# Patient Record
Sex: FEMALE | Race: BLACK OR AFRICAN AMERICAN | NOT HISPANIC OR LATINO | Employment: FULL TIME | URBAN - METROPOLITAN AREA
[De-identification: names, ages, dates, MRNs, and addresses within clinical notes are randomized per-mention and may not be internally consistent; named-entity substitution may affect disease eponyms.]

---

## 2017-01-12 ENCOUNTER — OFFICE VISIT (OUTPATIENT)
Dept: FAMILY MEDICINE | Facility: CLINIC | Age: 42
End: 2017-01-12
Payer: COMMERCIAL

## 2017-01-12 VITALS
RESPIRATION RATE: 18 BRPM | OXYGEN SATURATION: 97 % | BODY MASS INDEX: 45.19 KG/M2 | WEIGHT: 245.56 LBS | SYSTOLIC BLOOD PRESSURE: 110 MMHG | DIASTOLIC BLOOD PRESSURE: 70 MMHG | TEMPERATURE: 100 F | HEIGHT: 62 IN | HEART RATE: 105 BPM

## 2017-01-12 DIAGNOSIS — K59.00 CONSTIPATION, UNSPECIFIED CONSTIPATION TYPE: ICD-10-CM

## 2017-01-12 DIAGNOSIS — J34.3 NASAL TURBINATE HYPERTROPHY: ICD-10-CM

## 2017-01-12 DIAGNOSIS — R50.9 FEVER, UNSPECIFIED FEVER CAUSE: ICD-10-CM

## 2017-01-12 DIAGNOSIS — B34.9 VIRAL ILLNESS: Primary | ICD-10-CM

## 2017-01-12 LAB
CTP QC/QA: YES
CTP QC/QA: YES
FLUAV AG NPH QL: NEGATIVE
FLUBV AG NPH QL: NEGATIVE
S PYO RRNA THROAT QL PROBE: NEGATIVE

## 2017-01-12 PROCEDURE — 1159F MED LIST DOCD IN RCRD: CPT | Mod: S$GLB,,, | Performed by: FAMILY MEDICINE

## 2017-01-12 PROCEDURE — 87804 INFLUENZA ASSAY W/OPTIC: CPT | Mod: QW,S$GLB,, | Performed by: FAMILY MEDICINE

## 2017-01-12 PROCEDURE — 87880 STREP A ASSAY W/OPTIC: CPT | Mod: QW,S$GLB,, | Performed by: FAMILY MEDICINE

## 2017-01-12 PROCEDURE — 99214 OFFICE O/P EST MOD 30 MIN: CPT | Mod: 25,S$GLB,, | Performed by: FAMILY MEDICINE

## 2017-01-12 PROCEDURE — 99999 PR PBB SHADOW E&M-EST. PATIENT-LVL III: CPT | Mod: PBBFAC,,, | Performed by: FAMILY MEDICINE

## 2017-01-12 RX ORDER — FLUTICASONE PROPIONATE 50 MCG
2 SPRAY, SUSPENSION (ML) NASAL DAILY
Qty: 16 G | Refills: 0 | Status: SHIPPED | OUTPATIENT
Start: 2017-01-12 | End: 2017-05-09

## 2017-01-12 NOTE — MR AVS SNAPSHOT
[facility]  [address] [facility]  [place] [address]  Phone: [phone]                  Sophia Cesar   2017 8:00 AM   Office Visit    Description:  Female : 1975   Provider:  Perla Cheney MD   Department:  [facility]           Reason for Visit     Cough     Fever     Headache           Diagnoses this Visit        Comments    Fever, unspecified fever cause    -  Primary     Nasal turbinate hypertrophy                To Do List           Goals (5 Years of Data)     None       These Medications        Disp Refills Start End    fluticasone (FLONASE) 50 mcg/actuation nasal spray 16 g 0 2017     2 sprays by Each Nare route once daily. - Each Nare    Pharmacy: [facility] [address] [place] [address] [place] [address] GINNY GARCES AT [facility] #: [phone]         [facility] On Call     [facility] On Call Nurse Care Line -  Assistance  Registered nurses in the [facility] On Call Center provide clinical advisement, health education, appointment booking, and other advisory services.  Call for this free service at [phone].             Medications           Message regarding Medications     Verify the changes and/or additions to your medication regime listed below are the same as discussed with your clinician today.  If any of these changes or additions are incorrect, please notify your healthcare provider.        START taking these NEW medications        Refills    fluticasone (FLONASE) 50 mcg/actuation nasal spray 0    Si sprays by Each Nare route once daily.    Class: Normal    Route: Each Nare           Verify that the below list of medications is an accurate representation of the medications you are currently taking.  If none reported, the list may be blank. If incorrect, please contact your healthcare provider. Carry this list with you in case of emergency.           Current Medications     ergocalciferol  "(ERGOCALCIFEROL) 50,000 unit Cap Take 1 capsule (50,000 Units total) by mouth every 7 days.    fluticasone (FLONASE) 50 mcg/actuation nasal spray 2 sprays by Each Nare route once daily.           Clinical Reference Information           Vital Signs - Last Recorded  Most recent update: 1/12/2017  8:15 AM by Ann Cerda MA    BP Pulse Temp Resp    110/70 (BP Location: Right arm, Patient Position: Sitting, BP Method: Manual) 105 100 °F (37.8 °C) (Tympanic) 18    Ht Wt SpO2 BMI    5' 2" (1.575 m) 111.4 kg (245 lb 9.5 oz) 97% 44.92 kg/m2      Blood Pressure          Most Recent Value    BP  110/70      Allergies as of 1/12/2017     No Known Allergies      Immunizations Administered on Date of Encounter - 1/12/2017     None      Orders Placed During Today's Visit      Normal Orders This Visit    POCT Influenza A/B     POCT Rapid Strep A          1/12/2017  8:39 AM - Ann Cerda MA      Component Results     Component Value Flag Ref Range Units Status    Rapid Strep A Screen Negative  Negative  Final     Acceptable Yes    Final         1/12/2017  8:38 AM - Ann Cerda MA      Component Results     Component Value Flag Ref Range Units Status    Rapid Influenza A Ag Negative  Negative  Final    Rapid Influenza B Ag Negative  Negative  Final     Acceptable Yes    Final      "

## 2017-01-12 NOTE — PROGRESS NOTES
Subjective:       Patient ID: Sophia Cesar is a 41 y.o. female.    Chief Complaint: Cough; Fever; and Headache      HPI  Ms. Cesar presents to clinic today for complaints of fever.   She states she had a fever of 102. Her symptoms started Tuesday after work. She states she did get her flu shot in November. She also reports she has been constipated for the past few days.   Her cough is a dry cough.     Review of Systems   Constitutional: Positive for fever.   HENT: Positive for sore throat. Negative for rhinorrhea, sinus pressure and sneezing.    Respiratory: Positive for cough. Negative for shortness of breath.    Cardiovascular: Negative for chest pain.   Gastrointestinal: Positive for constipation. Negative for abdominal pain, blood in stool, nausea and vomiting.   Genitourinary: Negative for difficulty urinating and hematuria.   Musculoskeletal: Positive for myalgias.   Skin: Negative for rash.   Neurological: Negative for dizziness and headaches.       Medication List with Changes/Refills   New Medications    FLUTICASONE (FLONASE) 50 MCG/ACTUATION NASAL SPRAY    2 sprays by Each Nare route once daily.   Current Medications    ERGOCALCIFEROL (ERGOCALCIFEROL) 50,000 UNIT CAP    Take 1 capsule (50,000 Units total) by mouth every 7 days.       Patient Active Problem List   Diagnosis    Anxiety and depression    ETD (eustachian tube dysfunction)    TMJ (dislocation of temporomandibular joint)    Otalgia of right ear    Morbid obesity with BMI of 40.0-44.9, adult    Vitamin D deficiency    Pre-diabetes         Objective:     Physical Exam   Constitutional: She is oriented to person, place, and time. She appears well-developed and well-nourished. No distress.   HENT:   Head: Normocephalic and atraumatic.   Right Ear: External ear normal.   Left Ear: External ear normal.   Mouth/Throat: Oropharynx is clear and moist. No oropharyngeal exudate.   B/l tm wnl    Eyes: EOM are normal. Right eye exhibits no  discharge. Left eye exhibits no discharge.   Cardiovascular: Normal rate and regular rhythm.    Pulmonary/Chest: Effort normal and breath sounds normal. No respiratory distress. She has no wheezes.   Musculoskeletal: She exhibits no edema.   Neurological: She is alert and oriented to person, place, and time.   Skin: Skin is warm and dry. She is not diaphoretic. No erythema.   Psychiatric: She has a normal mood and affect.   Vitals reviewed.    Vitals:    01/12/17 0814   BP: 110/70   Pulse: 105   Resp: 18   Temp: 100 °F (37.8 °C)       Assessment/  PLAN     Viral illness  - flu and strep negative   - continue supportive care   - rest , hydration, fluids     Fever, unspecified fever cause  -     POCT Rapid Strep A  -     POCT Influenza A/B    Nasal turbinate hypertrophy  -     fluticasone (FLONASE) 50 mcg/actuation nasal spray; 2 sprays by Each Nare route once daily.  Dispense: 16 g; Refill: 0    Constipation, unspecified constipation type  - suggested she get miralax or fiber gummies, she states her mother has some miralax and she will try that   - increase water intake     Plan as above   rtc prn  Work excuse given  Return precautions advised    Perla Cheney MD  [facility]

## 2017-01-12 NOTE — LETTER
01/12/2017                 [facility]  [address]  [place] [address]  Phone: [phone]   01/12/2017    Patient: Sophia Cesar   YOB: 1975   Date of Visit: 1/12/2017       To Whom it May Concern:    Sophia Cesar was seen in my clinic on 1/12/2017. She may return to work on 01/16/2017.    If you have any questions or concerns, please don't hesitate to call.    Sincerely,         Perla Cheney MD

## 2017-01-15 RX ORDER — ERGOCALCIFEROL 1.25 MG/1
CAPSULE ORAL
Qty: 4 CAPSULE | Refills: 0 | OUTPATIENT
Start: 2017-01-15

## 2017-01-16 RX ORDER — ERGOCALCIFEROL 1.25 MG/1
50000 CAPSULE ORAL
Qty: 4 CAPSULE | Refills: 1 | Status: SHIPPED | OUTPATIENT
Start: 2017-01-16 | End: 2017-03-15 | Stop reason: SDUPTHER

## 2017-03-15 RX ORDER — ERGOCALCIFEROL 1.25 MG/1
CAPSULE ORAL
Qty: 4 CAPSULE | Refills: 0 | Status: SHIPPED | OUTPATIENT
Start: 2017-03-15 | End: 2017-04-18 | Stop reason: SDUPTHER

## 2017-04-18 RX ORDER — ERGOCALCIFEROL 1.25 MG/1
CAPSULE ORAL
Qty: 4 CAPSULE | Refills: 0 | Status: SHIPPED | OUTPATIENT
Start: 2017-04-18 | End: 2017-06-01 | Stop reason: SDUPTHER

## 2017-04-28 ENCOUNTER — PATIENT OUTREACH (OUTPATIENT)
Dept: ADMINISTRATIVE | Facility: HOSPITAL | Age: 42
End: 2017-04-28

## 2017-05-09 ENCOUNTER — OFFICE VISIT (OUTPATIENT)
Dept: FAMILY MEDICINE | Facility: CLINIC | Age: 42
End: 2017-05-09
Payer: COMMERCIAL

## 2017-05-09 VITALS
SYSTOLIC BLOOD PRESSURE: 114 MMHG | HEIGHT: 62 IN | BODY MASS INDEX: 46.01 KG/M2 | RESPIRATION RATE: 18 BRPM | WEIGHT: 250 LBS | HEART RATE: 86 BPM | TEMPERATURE: 97 F | DIASTOLIC BLOOD PRESSURE: 82 MMHG

## 2017-05-09 DIAGNOSIS — R73.03 PRE-DIABETES: ICD-10-CM

## 2017-05-09 DIAGNOSIS — Z00.00 ANNUAL PHYSICAL EXAM: Primary | ICD-10-CM

## 2017-05-09 DIAGNOSIS — E66.01 MORBID OBESITY WITH BMI OF 45.0-49.9, ADULT: ICD-10-CM

## 2017-05-09 DIAGNOSIS — E55.9 VITAMIN D DEFICIENCY: ICD-10-CM

## 2017-05-09 PROCEDURE — 99999 PR PBB SHADOW E&M-EST. PATIENT-LVL III: CPT | Mod: PBBFAC,,, | Performed by: FAMILY MEDICINE

## 2017-05-09 PROCEDURE — 99395 PREV VISIT EST AGE 18-39: CPT | Mod: S$GLB,,, | Performed by: FAMILY MEDICINE

## 2017-05-09 NOTE — MR AVS SNAPSHOT
[facility]  [address]  [place] [address]  Phone: [phone]                  Sophia Cesar   2017 9:15 AM   Office Visit    Description:  Female : 1975   Provider:  Barbi Elkins MD   Department:  [facility]           Reason for Visit     Annual Exam           Diagnoses this Visit        Comments    Annual physical exam    -  Primary     Pre-diabetes         Vitamin D deficiency         Morbid obesity with BMI of 45.0-49.9, adult                To Do List           Goals (5 Years of Data)     None      [facility]     [facility] On Call Nurse Care Line -  Assistance  Unless otherwise directed by your provider, please contact [facility], our nurse care line that is available for  assistance.     Registered nurses in the [facility] On Call Center provide: appointment scheduling, clinical advisement, health education, and other advisory services.  Call: [phone] (toll free)               Medications           Message regarding Medications     Verify the changes and/or additions to your medication regime listed below are the same as discussed with your clinician today.  If any of these changes or additions are incorrect, please notify your healthcare provider.        STOP taking these medications     fluticasone (FLONASE) 50 mcg/actuation nasal spray 2 sprays by Each Nare route once daily.           Verify that the below list of medications is an accurate representation of the medications you are currently taking.  If none reported, the list may be blank. If incorrect, please contact your healthcare provider. Carry this list with you in case of emergency.           Current Medications     ergocalciferol (ERGOCALCIFEROL) 50,000 unit Cap TAKE 1 CAPSULE BY MOUTH EVERY 7 DAYS           Clinical Reference Information           Your Vitals Were     BP Pulse Temp Resp Height Weight    114/82 (BP Location: Right arm,  "Patient Position: Sitting, BP Method: Manual) 86 97.3 °F (36.3 °C) (Tympanic) 18 5' 2" (1.575 m) 113.4 kg (250 lb)    BMI                45.73 kg/m2          Blood Pressure          Most Recent Value    BP  114/82      Allergies as of 5/9/2017     No Known Allergies      Immunizations Administered on Date of Encounter - 5/9/2017     None      Orders Placed During Today's Visit     Future Labs/Procedures Expected by Expires    C. trachomatis/N. gonorrhoeae by AMP DNA Urine  5/9/2017 7/8/2018    CBC auto differential  5/9/2017 7/8/2018    Comprehensive metabolic panel  5/9/2017 7/8/2018    Ferritin  5/9/2017 7/8/2018    Hemoglobin A1c  5/9/2017 7/8/2018    Hepatitis B surface antibody  5/9/2017 7/8/2018    Hepatitis B surface antigen  5/9/2017 7/8/2018    Hepatitis C antibody  5/9/2017 7/8/2018    HIV-1 and HIV-2 antibodies  5/9/2017 7/8/2018    Insulin, random  5/9/2017 7/8/2018    Lipid panel  5/9/2017 7/8/2018    RPR  5/9/2017 7/8/2018    TSH  5/9/2017 7/8/2018    Urinalysis  5/9/2017 7/8/2018    Vitamin D  5/9/2017 7/8/2018      Instructions    Please correspond with Dr. Elkins via My Chart for your results/follow up recommendations.        Language Assistance Services     ATTENTION: Language assistance services are available, free of charge. Please call [phone].      ATENCIÓN: Si habla español, tiene a bradley disposición servicios gratuitos de asistencia lingüística. Llame al [phone].     CHÚ Ý: N?u b?n nói Ti?ng Vi?t, có các d?ch v? h? tr? ngôn ng? mi?n phí dành cho b?n. G?i s? [phone].         [facility] complies with applicable Federal civil rights laws and does not discriminate on the basis of race, color, national origin, age, disability, or sex.        "

## 2017-05-09 NOTE — PROGRESS NOTES
HISTORY OF PRESENT ILLNESS: Ms. Cesar comes in today fasting without acute problems for annual wellness examination.     END OF LIFE DECISION: She does not have a living will and does desire life support.    Current Outpatient Prescriptions   Medication Sig    ergocalciferol (ERGOCALCIFEROL) 50,000 unit Cap TAKE 1 CAPSULE BY MOUTH EVERY 7 DAYS     SCREENINGS:   Cholesterol: April 4, 2016.  FFS/Colonoscopy: Never.  Mammogram: Never.  GYN Exam: April 1, 2016 - okay. July 23, 2013 pap smear - okay. No longer needs pap smear.  Dexa Scan: Never.  Eye Exam: July 2016. She wears glasses.   PPD: Never.  Immunizations: Tdap - September 2, 2010.  Gardasil - N./A.  Zostavax - N./A.  Pneumovax - Never.  Seasonal Flu - September 28, 2016.     ROS:  GENERAL: Denies fever, chills except reports weight gain, fatigue. Appetite normal. Weight 108 kg (238 lb 1.6 oz) at July 7, 2016 visit. Does not exercise as tired. Monitors diet - drinks more water, eats more vegetables and avoids eating fast foods.  SKIN: Denies rashes, itching, changes in mole, color or texture of skin or easy bruising.  HEAD: Denies headaches or recent head trauma.  EYES: Denies change in vision, pain, diplopia, redness or discharge. Wears glasses.  EARS: Denies ear pain, discharge, vertigo or decreased hearing.  NOSE: Denies loss of smell, epistaxis or rhinitis.  MOUTH & THROAT: Denies hoarseness or change in voice. Denies excessive gum bleeding or mouth sores. Denies sore throat.  NODES: Denies swollen glands.  CHEST: Denies LUCIANO, wheezing, cough, or sputum production.  CARDIOVASCULAR: Denies chest pain, PND, orthopnea or reduced exercise tolerance. Denies palpitations.  ABDOMEN: Denies diarrhea, constipation, nausea, vomiting, abdominal pain, or blood in stool.  URINARY: Denies flank pain, dysuria or hematuria.  GENITOURINARY: Denies flank pain, dysuria, frequency or hematuria. Luciano not perform monthly breast self exam. Requests HIV test.  ENDOCRINE: Denies  "diabetes, cholesterol, thyroid problems. Reports tired even with taking vitamin D 46918 units weekly.  HEME/LYMPH: Denies bleeding problems.  PERIPHERAL VASCULAR:Denies claudication or cyanosis.  MUSCULOSKELETAL: Denies joint stiffness, pain or swelling. Denies edema.  NEUROLOGIC: Denies history of seizures, tremors, paralysis, alteration of gait or coordination.  PSYCHIATRIC: Denies mood swings, depression, anxiety, homicidal or suicidal thoughts. Denies sleep problems.     PE:   VS: /82 (BP Location: Right arm, Patient Position: Sitting, BP Method: Manual)  Pulse 86  Temp 97.3 °F (36.3 °C) (Tympanic)   Resp 18  Ht 5' 2" (1.575 m)  Wt 113.4 kg (250 lb)  BMI 45.73 kg/m2  APPEARANCE: Well nourished, well developed female, obese and pleasant, alert and oriented, in no acute distress.   HEAD: Non tender. Full range of motion.  EYES: PERRL, conjunctiva pink, lids no edema. She wears glasses.  EARS: External canal patent, no swelling or redness. TM's shiny and clear.  NOSE: Mucosa and turbinates pink, not swollen. No discharge. Non tender sinuses.  THROAT: No pharyngeal erythema or exudate. No stridor.   NECK: Supple, no mass, thyroid not enlarged.  NODES: No cervical, axillary or inguinal lymph node enlargement.  CHEST: Normal respiratory effort. Lungs clear to auscultation.  CARDIOVASCULAR: Normal S1, S2. No rubs, murmurs or gallops. PMI not displaced. No carotid bruit. Pedal pulses palpable bilaterally. No edema.  ABDOMEN: Bowel sounds present. Not distended. Soft. No tenderness, masses or organomegaly.  BREAST EXAM: Symmetrical, no external lesions, no discharge, no masses palpated.  PELVIC EXAM: No external lesions noted, no discharge, absent cervix and uterus, bimanual exam showed no adnexal masses or tenderness. Urethra and bladder intact.  RECTAL EXAM: Not examined.  MUSCULOSKELETAL: No joint deformities or stiffness. She is ambulatory without problems.  SKIN: No rashes or suspicious lesions, normal " color and turgor.  NEUROLOGIC: Cranial Nerves: II-XII grossly intact. DTR's: Knees, Ankles 2+ and equal bilaterally. Gait & Posture: Normal gait and fine motion.  PSYCHIATRIC: Patient alert, oriented x 3. Mood/Affect normal. Judgment/insight good as she makes appropriate decisions during today's exam.     ASSESSMENT:    ICD-10-CM ICD-9-CM    1. Annual physical exam Z00.00 V70.0 CBC auto differential      TSH      Urinalysis      Comprehensive metabolic panel      Lipid panel      Vitamin D      Insulin, random      Hemoglobin A1c      Ferritin      RPR      HIV-1 and HIV-2 antibodies      Hepatitis B surface antibody      Hepatitis B surface antigen      Hepatitis C antibody      C. trachomatis/N. gonorrhoeae by AMP DNA Urine   2. Pre-diabetes R73.03 790.29    3. Vitamin D deficiency E55.9 268.9    4. Morbid obesity with BMI of 45.0-49.9, adult E66.01 278.01     Z68.42 V85.42      PLAN:   1. Age-appropriate counseling-appropriate low-sodium, low-cholesterol, low carbohydrate diet and exercise daily, monthly breast self examination, safe sex practices, annual wellness examination.  2. Patient advised to correspond via My Chart for results/further recommendations.  3. Consider adding OTC MVI daily.  4. Work excuse for today with return tomorrow.

## 2017-05-10 LAB
25(OH)D3+25(OH)D2 SERPL-MCNC: 27 NG/ML (ref 30–100)
ALBUMIN SERPL-MCNC: 4.1 G/DL (ref 3.5–5.5)
ALBUMIN/GLOB SERPL: 1.1 {RATIO} (ref 1.2–2.2)
ALP SERPL-CCNC: 113 IU/L (ref 39–117)
ALT SERPL-CCNC: 17 IU/L (ref 0–32)
APPEARANCE UR: ABNORMAL
AST SERPL-CCNC: 16 IU/L (ref 0–40)
BACTERIA #/AREA URNS HPF: ABNORMAL /[HPF]
BASOPHILS # BLD AUTO: 0 X10E3/UL (ref 0–0.2)
BASOPHILS NFR BLD AUTO: 1 %
BILIRUB SERPL-MCNC: 0.5 MG/DL (ref 0–1.2)
BILIRUB UR QL STRIP: NEGATIVE
BUN SERPL-MCNC: 10 MG/DL (ref 6–20)
BUN/CREAT SERPL: 11 (ref 9–23)
C TRACH RRNA SPEC QL NAA+PROBE: POSITIVE
CALCIUM SERPL-MCNC: 9.2 MG/DL (ref 8.7–10.2)
CHLORIDE SERPL-SCNC: 101 MMOL/L (ref 96–106)
CHOLEST SERPL-MCNC: 209 MG/DL (ref 100–199)
CO2 SERPL-SCNC: 23 MMOL/L (ref 18–29)
COLOR UR: YELLOW
CREAT SERPL-MCNC: 0.89 MG/DL (ref 0.57–1)
EOSINOPHIL # BLD AUTO: 0.3 X10E3/UL (ref 0–0.4)
EOSINOPHIL NFR BLD AUTO: 5 %
EPI CELLS #/AREA URNS HPF: >10 /HPF
ERYTHROCYTE [DISTWIDTH] IN BLOOD BY AUTOMATED COUNT: 13.9 % (ref 12.3–15.4)
FERRITIN SERPL-MCNC: 118 NG/ML (ref 15–150)
GLOBULIN SER CALC-MCNC: 3.7 G/DL (ref 1.5–4.5)
GLUCOSE SERPL-MCNC: 121 MG/DL (ref 65–99)
GLUCOSE UR QL: NEGATIVE
HBA1C MFR BLD: 5.9 % (ref 4.8–5.6)
HBV E AG SERPL QL IA: NEGATIVE
HBV SURFACE AB SER QL: NON REACTIVE
HCT VFR BLD AUTO: 40 % (ref 34–46.6)
HCV AB S/CO SERPL IA: <0.1 S/CO RATIO (ref 0–0.9)
HDLC SERPL-MCNC: 54 MG/DL
HGB BLD-MCNC: 13 G/DL (ref 11.1–15.9)
HGB UR QL STRIP: NEGATIVE
HIV 1+2 AB+HIV1 P24 AG SERPL QL IA: NON REACTIVE
IMM GRANULOCYTES # BLD: 0 X10E3/UL (ref 0–0.1)
IMM GRANULOCYTES NFR BLD: 0 %
INSULIN SERPL-ACNC: 6.3 UIU/ML (ref 2.6–24.9)
KETONES UR QL STRIP: NEGATIVE
LDLC SERPL CALC-MCNC: 139 MG/DL (ref 0–99)
LEUKOCYTE ESTERASE UR QL STRIP: NEGATIVE
LYMPHOCYTES # BLD AUTO: 2.7 X10E3/UL (ref 0.7–3.1)
LYMPHOCYTES NFR BLD AUTO: 40 %
MCH RBC QN AUTO: 28.9 PG (ref 26.6–33)
MCHC RBC AUTO-ENTMCNC: 32.5 G/DL (ref 31.5–35.7)
MCV RBC AUTO: 89 FL (ref 79–97)
MICRO URNS: ABNORMAL
MONOCYTES # BLD AUTO: 0.4 X10E3/UL (ref 0.1–0.9)
MONOCYTES NFR BLD AUTO: 6 %
MUCOUS THREADS URNS QL MICRO: PRESENT
N GONORRHOEA RRNA SPEC QL NAA+PROBE: NEGATIVE
NEUTROPHILS # BLD AUTO: 3.2 X10E3/UL (ref 1.4–7)
NEUTROPHILS NFR BLD AUTO: 48 %
NITRITE UR QL STRIP: NEGATIVE
PH UR STRIP: 7.5 [PH] (ref 5–7.5)
PLATELET # BLD AUTO: 318 X10E3/UL (ref 150–379)
POTASSIUM SERPL-SCNC: 4.1 MMOL/L (ref 3.5–5.2)
PROT SERPL-MCNC: 7.8 G/DL (ref 6–8.5)
PROT UR QL STRIP: ABNORMAL
RBC # BLD AUTO: 4.5 X10E6/UL (ref 3.77–5.28)
RBC #/AREA URNS HPF: ABNORMAL /HPF
RPR SER QL: NON REACTIVE
SODIUM SERPL-SCNC: 141 MMOL/L (ref 134–144)
SP GR UR: 1.02 (ref 1–1.03)
TRIGL SERPL-MCNC: 78 MG/DL (ref 0–149)
TSH SERPL DL<=0.005 MIU/L-ACNC: 1.83 UIU/ML (ref 0.45–4.5)
UROBILINOGEN UR STRIP-MCNC: 1 MG/DL (ref 0.2–1)
VLDLC SERPL CALC-MCNC: 16 MG/DL (ref 5–40)
WBC # BLD AUTO: 6.7 X10E3/UL (ref 3.4–10.8)
WBC #/AREA URNS HPF: ABNORMAL /HPF

## 2017-05-12 ENCOUNTER — PATIENT MESSAGE (OUTPATIENT)
Dept: FAMILY MEDICINE | Facility: CLINIC | Age: 42
End: 2017-05-12

## 2017-05-16 RX ORDER — AZITHROMYCIN 1 G/1
1 POWDER, FOR SUSPENSION ORAL ONCE
Qty: 1 PACKET | Refills: 0 | Status: SHIPPED | OUTPATIENT
Start: 2017-05-16 | End: 2017-05-16

## 2017-05-18 ENCOUNTER — TELEPHONE (OUTPATIENT)
Dept: FAMILY MEDICINE | Facility: CLINIC | Age: 42
End: 2017-05-18

## 2017-05-18 ENCOUNTER — PATIENT MESSAGE (OUTPATIENT)
Dept: FAMILY MEDICINE | Facility: CLINIC | Age: 42
End: 2017-05-18

## 2017-05-18 NOTE — TELEPHONE ENCOUNTER
----- Message from Barbi Elkins MD sent at 5/16/2017 11:30 PM CDT -----  Please make sure pt received e-mail regarding results and schedules 1-month f/u appt w/me for vaginal infection recheck.    Nurse - Please report STD on form. Thanks.

## 2017-06-02 RX ORDER — ERGOCALCIFEROL 1.25 MG/1
CAPSULE ORAL
Qty: 4 CAPSULE | Refills: 5 | Status: SHIPPED | OUTPATIENT
Start: 2017-06-02 | End: 2018-02-16 | Stop reason: SDUPTHER

## 2017-06-05 ENCOUNTER — PATIENT MESSAGE (OUTPATIENT)
Dept: FAMILY MEDICINE | Facility: CLINIC | Age: 42
End: 2017-06-05

## 2017-06-06 ENCOUNTER — OFFICE VISIT (OUTPATIENT)
Dept: FAMILY MEDICINE | Facility: CLINIC | Age: 42
End: 2017-06-06
Payer: COMMERCIAL

## 2017-06-06 VITALS
TEMPERATURE: 100 F | HEIGHT: 62 IN | HEART RATE: 105 BPM | SYSTOLIC BLOOD PRESSURE: 108 MMHG | BODY MASS INDEX: 46.17 KG/M2 | WEIGHT: 250.88 LBS | RESPIRATION RATE: 18 BRPM | DIASTOLIC BLOOD PRESSURE: 70 MMHG | OXYGEN SATURATION: 97 %

## 2017-06-06 DIAGNOSIS — J02.0 STREP PHARYNGITIS: Primary | ICD-10-CM

## 2017-06-06 DIAGNOSIS — R30.0 DYSURIA: ICD-10-CM

## 2017-06-06 DIAGNOSIS — J02.9 SORE THROAT: ICD-10-CM

## 2017-06-06 DIAGNOSIS — Z86.19 HISTORY OF CHLAMYDIA: ICD-10-CM

## 2017-06-06 LAB
BILIRUB SERPL-MCNC: POSITIVE MG/DL
BLOOD URINE, POC: NEGATIVE
COLOR, POC UA: YELLOW
CTP QC/QA: YES
GLUCOSE UR QL STRIP: NORMAL
KETONES UR QL STRIP: NEGATIVE
LEUKOCYTE ESTERASE URINE, POC: ABNORMAL
NITRITE, POC UA: NEGATIVE
PH, POC UA: 8
PROTEIN, POC: NEGATIVE
S PYO RRNA THROAT QL PROBE: POSITIVE
SPECIFIC GRAVITY, POC UA: 1.01
UROBILINOGEN, POC UA: 1

## 2017-06-06 PROCEDURE — 87880 STREP A ASSAY W/OPTIC: CPT | Mod: QW,S$GLB,, | Performed by: REGISTERED NURSE

## 2017-06-06 PROCEDURE — 87086 URINE CULTURE/COLONY COUNT: CPT

## 2017-06-06 PROCEDURE — 87591 N.GONORRHOEAE DNA AMP PROB: CPT

## 2017-06-06 PROCEDURE — 99214 OFFICE O/P EST MOD 30 MIN: CPT | Mod: 25,S$GLB,, | Performed by: REGISTERED NURSE

## 2017-06-06 PROCEDURE — 81002 URINALYSIS NONAUTO W/O SCOPE: CPT | Mod: S$GLB,,, | Performed by: REGISTERED NURSE

## 2017-06-06 PROCEDURE — 99999 PR PBB SHADOW E&M-EST. PATIENT-LVL IV: CPT | Mod: PBBFAC,,, | Performed by: REGISTERED NURSE

## 2017-06-06 RX ORDER — AMOXICILLIN 875 MG/1
875 TABLET, FILM COATED ORAL EVERY 12 HOURS
Qty: 20 TABLET | Refills: 0 | Status: SHIPPED | OUTPATIENT
Start: 2017-06-06 | End: 2017-06-16

## 2017-06-06 NOTE — LETTER
June 6, 2017      [facility]  [address] [facility] [address]  Phone: [phone]       Patient: Sophia Cesar   YOB: 1975  Date of Visit: 06/06/2017    To Whom It May Concern:    Sophia  was at [facility] on 06/06/2017. She may return to work on 06/07/2017 with no restrictions. If you have any questions or concerns, or if I can be of further assistance, please do not hesitate to contact me.    Sincerely,    Haile Egan NP

## 2017-06-06 NOTE — PROGRESS NOTES
Subjective:       Patient ID: Sophia Cesar is a 41 y.o. female.    Chief Complaint: Sore Throat and Urinary Tract Infection      HPI    Sophia is here today with c/o sore throat since yesterday.  Denies fever or chills.  No sinus problems reported.    Reports uncomfortable urination at times w/out frequency or urgency over the past several days.  Treated for chlamydia infection last month with Zithromax but not able to keep medication down.  Denies vaginal pain or discharge.      Review of Systems   Constitutional: Negative for chills and fever.   HENT: Positive for sore throat. Negative for congestion, drooling, ear discharge, ear pain, postnasal drip, rhinorrhea, sinus pressure and trouble swallowing.    Respiratory: Negative.  Negative for cough, shortness of breath and stridor.    Cardiovascular: Negative.    Gastrointestinal: Negative.  Negative for abdominal pain, diarrhea, nausea and vomiting.   Genitourinary: Positive for dysuria. Negative for vaginal discharge and vaginal pain.   Skin: Negative.    Neurological: Negative.  Negative for headaches.         Patient Active Problem List   Diagnosis    Anxiety and depression    TMJ (dislocation of temporomandibular joint)    Vitamin D deficiency    Pre-diabetes    Morbid obesity with BMI of 45.0-49.9, adult         Objective:     Physical Exam   Constitutional: She is oriented to person, place, and time. She appears well-developed and well-nourished.   HENT:   Head: Normocephalic and atraumatic.   Right Ear: Tympanic membrane, external ear and ear canal normal.   Left Ear: Tympanic membrane, external ear and ear canal normal.   Nose: Nose normal.   Mouth/Throat: Mucous membranes are normal. Posterior oropharyngeal edema and posterior oropharyngeal erythema present. No oropharyngeal exudate. Tonsils are 2+ on the right. Tonsils are 2+ on the left. No tonsillar exudate.   Eyes: Conjunctivae and EOM are normal. Pupils are equal, round, and reactive  to light.   Cardiovascular: Normal rate, regular rhythm and normal heart sounds.    Pulmonary/Chest: Effort normal and breath sounds normal.   Lymphadenopathy:     She has cervical adenopathy.   Neurological: She is alert and oriented to person, place, and time.         Medication List with Changes/Refills   New Medications    AMOXICILLIN (AMOXIL) 875 MG TABLET    Take 1 tablet (875 mg total) by mouth every 12 (twelve) hours.   Current Medications    ERGOCALCIFEROL (ERGOCALCIFEROL) 50,000 UNIT CAP    TAKE 1 CAPSULE BY MOUTH EVERY 7 DAYS           Component      Latest Ref Rng & Units 6/6/2017   Rapid Strep A Screen      Negative Positive (A)    Acceptable       Yes         Component      Latest Ref Rng & Units 6/6/2017   Color, UA       yellow   Spec Grav UA       1.015   pH, UA       8   WBC, UA       trace   Nitrite, UA       negative   Protein       negative   Glucose, UA       normal   Ketones, UA       negative   Urobilinogen, UA       1   Bilirubin       positive   RBC, UA       negative       Diagnosis       1. Strep pharyngitis    2. Sore throat    3. History of chlamydia    4. Dysuria          Assessment/ Plan     Strep pharyngitis  -     amoxicillin (AMOXIL) 875 MG tablet; Take 1 tablet (875 mg total) by mouth every 12 (twelve) hours.  Dispense: 20 tablet; Refill: 0  -     Throat care discussed.  -     New toothbrush.    Sore throat  -     POCT rapid strep A  -     amoxicillin (AMOXIL) 875 MG tablet; Take 1 tablet (875 mg total) by mouth every 12 (twelve) hours.  Dispense: 20 tablet; Refill: 0    History of chlamydia  -     C. trachomatis/N. gonorrhoeae by AMP DNA Urine    Dysuria  -     POCT urine dipstick without microscope  -     C. trachomatis/N. gonorrhoeae by AMP DNA Urine  -     Urine culture        Urine results pending.  RTC prn.      ALYSSA Stark  [facility]

## 2017-06-07 LAB
C TRACH DNA SPEC QL NAA+PROBE: NOT DETECTED
N GONORRHOEA DNA SPEC QL NAA+PROBE: NOT DETECTED

## 2017-06-08 LAB
BACTERIA UR CULT: NORMAL
BACTERIA UR CULT: NORMAL

## 2018-02-16 ENCOUNTER — PATIENT MESSAGE (OUTPATIENT)
Dept: FAMILY MEDICINE | Facility: CLINIC | Age: 43
End: 2018-02-16

## 2018-02-20 RX ORDER — ERGOCALCIFEROL 1.25 MG/1
50000 CAPSULE ORAL
Qty: 4 CAPSULE | Refills: 2 | Status: SHIPPED | OUTPATIENT
Start: 2018-02-20 | End: 2018-08-31 | Stop reason: SDUPTHER

## 2018-02-21 RX ORDER — ACYCLOVIR 400 MG/1
400 TABLET ORAL 3 TIMES DAILY
Qty: 21 TABLET | Refills: 0 | Status: SHIPPED | OUTPATIENT
Start: 2018-02-21 | End: 2018-10-12 | Stop reason: SDUPTHER

## 2018-04-15 ENCOUNTER — PATIENT MESSAGE (OUTPATIENT)
Dept: FAMILY MEDICINE | Facility: CLINIC | Age: 43
End: 2018-04-15

## 2018-04-20 ENCOUNTER — OFFICE VISIT (OUTPATIENT)
Dept: FAMILY MEDICINE | Facility: CLINIC | Age: 43
End: 2018-04-20
Payer: COMMERCIAL

## 2018-04-20 VITALS
BODY MASS INDEX: 46.05 KG/M2 | HEART RATE: 108 BPM | TEMPERATURE: 99 F | RESPIRATION RATE: 17 BRPM | SYSTOLIC BLOOD PRESSURE: 116 MMHG | OXYGEN SATURATION: 99 % | WEIGHT: 250.25 LBS | HEIGHT: 62 IN | DIASTOLIC BLOOD PRESSURE: 78 MMHG

## 2018-04-20 DIAGNOSIS — Z00.00 ANNUAL PHYSICAL EXAM: Primary | ICD-10-CM

## 2018-04-20 DIAGNOSIS — L02.92 BOIL: Primary | ICD-10-CM

## 2018-04-20 PROCEDURE — 99999 PR PBB SHADOW E&M-EST. PATIENT-LVL III: CPT | Mod: PBBFAC,,, | Performed by: FAMILY MEDICINE

## 2018-04-20 PROCEDURE — 99213 OFFICE O/P EST LOW 20 MIN: CPT | Mod: S$GLB,,, | Performed by: FAMILY MEDICINE

## 2018-04-20 NOTE — PROGRESS NOTES
Chief Complaint   Patient presents with    Recurrent Skin Infections       History of Present Illness:   Ms. Cesar comes in today with complaint of having boil at her right leg which she noticed 5 days ago.  She denies known bites to the area but reports pain initially at the area with drainage of boil following soaking with Epson salt 3 days ago.  She states the ball is no longer painful or draining.  She denies having fever or chills; chest pain, palpitations, leg swelling; shortness of breath, cough, wheezing; abdominal pain, nausea, vomiting, diarrhea.        Current Outpatient Prescriptions   Medication Sig    acyclovir (ZOVIRAX) 400 MG tablet Take 1 tablet (400 mg total) by mouth 3 (three) times daily.    ergocalciferol (ERGOCALCIFEROL) 50,000 unit Cap Take 1 capsule (50,000 Units total) by mouth every 7 days.         Review of Systems   Constitutional: Negative for activity change, chills, fever and unexpected weight change.   HENT: Negative for hearing loss, rhinorrhea and trouble swallowing.    Eyes: Negative for discharge and visual disturbance.   Respiratory: Negative for cough, chest tightness, shortness of breath and wheezing.    Cardiovascular: Negative for chest pain and palpitations.   Gastrointestinal: Negative for abdominal pain, blood in stool, constipation, diarrhea and vomiting.   Endocrine: Negative for polydipsia and polyuria.   Genitourinary: Negative for difficulty urinating, dysuria, hematuria and menstrual problem.   Musculoskeletal: Negative for arthralgias, joint swelling and neck pain.   Skin:        See history of present illness.   Neurological: Negative for weakness and headaches.   Psychiatric/Behavioral: Negative for confusion and dysphoric mood.       Objective:  Physical Exam   Constitutional: She appears well-developed and well-nourished. No distress.   Pleasant.   Cardiovascular: Normal rate and regular rhythm.    No murmur heard.  Pulmonary/Chest: Effort normal and breath  sounds normal. No respiratory distress. She has no wheezes.   Abdominal: Soft. Bowel sounds are normal. She exhibits no distension. There is no tenderness. There is no rebound.   Musculoskeletal: Normal range of motion.   She is ambulatory without problems.   Neurological: She is alert.   Skin: She is not diaphoretic.   Resolving boil at inner right thigh without drainage, warmth, redness or tenderness noted.   Psychiatric: She has a normal mood and affect. Her behavior is normal. Judgment and thought content normal.   Vitals reviewed.        ASSESSMENT:  1. Boil        PLAN:  Sophia was seen today for recurrent skin infections.    Diagnoses and all orders for this visit:    Boil    Reassurance.  Continue current medications, follow low sodium, low cholesterol, low carb diet, daily walks.  See me May 2018 for physical.

## 2018-05-05 LAB
25(OH)D3+25(OH)D2 SERPL-MCNC: 19.2 NG/ML (ref 30–100)
ALBUMIN SERPL-MCNC: 4 G/DL (ref 3.5–5.5)
ALBUMIN/GLOB SERPL: 1.2 {RATIO} (ref 1.2–2.2)
ALP SERPL-CCNC: 121 IU/L (ref 39–117)
ALT SERPL-CCNC: 20 IU/L (ref 0–32)
AST SERPL-CCNC: 17 IU/L (ref 0–40)
BASOPHILS # BLD AUTO: 0 X10E3/UL (ref 0–0.2)
BASOPHILS NFR BLD AUTO: 0 %
BILIRUB SERPL-MCNC: 0.4 MG/DL (ref 0–1.2)
BUN SERPL-MCNC: 9 MG/DL (ref 6–24)
BUN/CREAT SERPL: 11 (ref 9–23)
CALCIUM SERPL-MCNC: 8.8 MG/DL (ref 8.7–10.2)
CHLORIDE SERPL-SCNC: 102 MMOL/L (ref 96–106)
CHOLEST SERPL-MCNC: 195 MG/DL (ref 100–199)
CO2 SERPL-SCNC: 24 MMOL/L (ref 18–29)
CREAT SERPL-MCNC: 0.8 MG/DL (ref 0.57–1)
EOSINOPHIL # BLD AUTO: 0.4 X10E3/UL (ref 0–0.4)
EOSINOPHIL NFR BLD AUTO: 5 %
ERYTHROCYTE [DISTWIDTH] IN BLOOD BY AUTOMATED COUNT: 14.1 % (ref 12.3–15.4)
GFR SERPLBLD CREATININE-BSD FMLA CKD-EPI: 107 ML/MIN/1.73
GFR SERPLBLD CREATININE-BSD FMLA CKD-EPI: 93 ML/MIN/1.73
GLOBULIN SER CALC-MCNC: 3.3 G/DL (ref 1.5–4.5)
GLUCOSE SERPL-MCNC: 117 MG/DL (ref 65–99)
HBA1C MFR BLD: 6.3 % (ref 4.8–5.6)
HBV SURFACE AB SER QL: NON REACTIVE
HBV SURFACE AG SERPL QL IA: NEGATIVE
HCT VFR BLD AUTO: 40.3 % (ref 34–46.6)
HCV AB S/CO SERPL IA: <0.1 S/CO RATIO (ref 0–0.9)
HDLC SERPL-MCNC: 41 MG/DL
HGB BLD-MCNC: 13.2 G/DL (ref 11.1–15.9)
HIV 1+2 AB+HIV1 P24 AG SERPL QL IA: NON REACTIVE
IMM GRANULOCYTES # BLD: 0 X10E3/UL (ref 0–0.1)
IMM GRANULOCYTES NFR BLD: 0 %
INSULIN SERPL-ACNC: 10.2 UIU/ML (ref 2.6–24.9)
LDLC SERPL CALC-MCNC: 133 MG/DL (ref 0–99)
LYMPHOCYTES # BLD AUTO: 2.5 X10E3/UL (ref 0.7–3.1)
LYMPHOCYTES NFR BLD AUTO: 33 %
MCH RBC QN AUTO: 29.2 PG (ref 26.6–33)
MCHC RBC AUTO-ENTMCNC: 32.8 G/DL (ref 31.5–35.7)
MCV RBC AUTO: 89 FL (ref 79–97)
MONOCYTES # BLD AUTO: 0.5 X10E3/UL (ref 0.1–0.9)
MONOCYTES NFR BLD AUTO: 7 %
NEUTROPHILS # BLD AUTO: 4.1 X10E3/UL (ref 1.4–7)
NEUTROPHILS NFR BLD AUTO: 55 %
PLATELET # BLD AUTO: 300 X10E3/UL (ref 150–379)
POTASSIUM SERPL-SCNC: 4 MMOL/L (ref 3.5–5.2)
PROT SERPL-MCNC: 7.3 G/DL (ref 6–8.5)
RBC # BLD AUTO: 4.52 X10E6/UL (ref 3.77–5.28)
RPR SER QL: NON REACTIVE
SODIUM SERPL-SCNC: 140 MMOL/L (ref 134–144)
TRIGL SERPL-MCNC: 105 MG/DL (ref 0–149)
TSH SERPL DL<=0.005 MIU/L-ACNC: 2.88 UIU/ML (ref 0.45–4.5)
VLDLC SERPL CALC-MCNC: 21 MG/DL (ref 5–40)
WBC # BLD AUTO: 7.5 X10E3/UL (ref 3.4–10.8)

## 2018-05-10 ENCOUNTER — OFFICE VISIT (OUTPATIENT)
Dept: FAMILY MEDICINE | Facility: CLINIC | Age: 43
End: 2018-05-10
Payer: COMMERCIAL

## 2018-05-10 VITALS
WEIGHT: 250.44 LBS | TEMPERATURE: 98 F | HEART RATE: 82 BPM | RESPIRATION RATE: 17 BRPM | HEIGHT: 62 IN | BODY MASS INDEX: 46.09 KG/M2 | OXYGEN SATURATION: 98 %

## 2018-05-10 DIAGNOSIS — E55.9 VITAMIN D DEFICIENCY: ICD-10-CM

## 2018-05-10 DIAGNOSIS — R73.03 PRE-DIABETES: ICD-10-CM

## 2018-05-10 DIAGNOSIS — Z00.00 ANNUAL PHYSICAL EXAM: Primary | ICD-10-CM

## 2018-05-10 DIAGNOSIS — E66.01 MORBID OBESITY WITH BMI OF 45.0-49.9, ADULT: ICD-10-CM

## 2018-05-10 DIAGNOSIS — Z12.31 VISIT FOR SCREENING MAMMOGRAM: ICD-10-CM

## 2018-05-10 PROCEDURE — 99396 PREV VISIT EST AGE 40-64: CPT | Mod: S$GLB,,, | Performed by: FAMILY MEDICINE

## 2018-05-10 PROCEDURE — 99999 PR PBB SHADOW E&M-EST. PATIENT-LVL III: CPT | Mod: PBBFAC,,, | Performed by: FAMILY MEDICINE

## 2018-05-10 NOTE — PROGRESS NOTES
HISTORY OF PRESENT ILLNESS: Ms. Cesar comes in today fasting without taking medication and without acute problems for annual wellness examination.     END OF LIFE DECISION: She does not have a living will and does desire life support.    Current Outpatient Prescriptions   Medication Sig    acyclovir (ZOVIRAX) 400 MG tablet Take 1 tablet (400 mg total) by mouth 3 (three) times daily.    ergocalciferol (ERGOCALCIFEROL) 50,000 unit Cap Take 1 capsule (50,000 Units total) by mouth every 7 days.      SCREENINGS:   Cholesterol: May 4, 2018.  FFS/Colonoscopy: Never.  Mammogram: Never. She desires.  GYN Exam: May 9, 2017 - okay. July 23, 2013 pap smear - okay. No longer needs pap smear.  Dexa Scan: Never.  Eye Exam: November 2017. She wears glasses.   PPD: Never.  Immunizations: Tdap - September 2, 2010.  Gardasil - N./A.  Zostavax - N./A.  Pneumovax - Never.  Seasonal Flu - September 28, 2016. But, thinks received at work in Fall 2017.     ROS:  GENERAL: Denies fever, chills, weight gain except reports fatigue as not active. Appetite normal. Weight 113.4 kg (250 lb) at May 9, 2017 visit. Does not exercise as tired. Does not monitor.  SKIN: Denies rashes, itching, changes in mole, color or texture of skin or easy bruising.  HEAD: Denies headaches or recent head trauma.  EYES: Denies change in vision, pain, diplopia, redness or discharge. Wears glasses.  EARS: Denies ear pain, discharge, vertigo or decreased hearing.  NOSE: Denies loss of smell, epistaxis or rhinitis.  MOUTH & THROAT: Denies hoarseness or change in voice. Denies excessive gum bleeding or mouth sores. Denies sore throat.  NODES: Denies swollen glands.  CHEST: Denies BIRD, wheezing, cough, or sputum production.  CARDIOVASCULAR: Denies chest pain, PND, orthopnea or reduced exercise tolerance. Denies palpitations.  ABDOMEN: Denies diarrhea, constipation, nausea, vomiting, abdominal pain, or blood in stool.  URINARY: Denies flank pain, dysuria or  "hematuria.  GENITOURINARY: Denies flank pain, dysuria, frequency or hematuria. Occasionally performs monthly breast self exam.   ENDOCRINE: Denies diabetes, cholesterol, thyroid problems. Reports tired even with taking vitamin D 04931 units weekly but admits not consistently takes it.  HEME/LYMPH: Denies bleeding problems.  PERIPHERAL VASCULAR:Denies claudication or cyanosis.  MUSCULOSKELETAL: Denies joint stiffness, pain or swelling. Denies edema.  NEUROLOGIC: Denies history of seizures, tremors, paralysis, alteration of gait or coordination.  PSYCHIATRIC: Denies mood swings, depression, anxiety, homicidal or suicidal thoughts. Denies sleep problems.     PE:   VS: Pulse 82   Temp 98.3 °F (36.8 °C) (Oral)   Resp 17   Ht 5' 2" (1.575 m)   Wt 113.6 kg (250 lb 7.1 oz)   SpO2 98%   BMI 45.81 kg/m²   APPEARANCE: Well nourished, well developed female, obese and pleasant, alert and oriented, in no acute distress.   HEAD: Non tender. Full range of motion.  EYES: PERRL, conjunctiva pink, lids no edema. She wears glasses.  EARS: External canal patent, no swelling or redness. TM's shiny and clear.  NOSE: Mucosa and turbinates pink, not swollen. No discharge. Non tender sinuses.  THROAT: No pharyngeal erythema or exudate. No stridor.   NECK: Supple, no mass, thyroid not enlarged.  NODES: No cervical, axillary or inguinal lymph node enlargement.  CHEST: Normal respiratory effort. Lungs clear to auscultation.  CARDIOVASCULAR: Normal S1, S2. No rubs, murmurs or gallops. PMI not displaced. No carotid bruit. Pedal pulses palpable bilaterally. No edema.  ABDOMEN: Bowel sounds present. Not distended. Soft. No tenderness, masses or organomegaly.  BREAST EXAM: Symmetrical, no external lesions, no discharge, no masses palpated.  PELVIC EXAM: No external lesions noted, no discharge, absent cervix and uterus, bimanual exam showed no adnexal masses or tenderness. Urethra and bladder intact.  RECTAL EXAM: Not examined per patient " request.  MUSCULOSKELETAL: No joint deformities or stiffness. She is ambulatory without problems.  SKIN: No rashes or suspicious lesions, normal color and turgor.  NEUROLOGIC: Cranial Nerves: II-XII grossly intact. DTR's: Knees, Ankles 2+ and equal bilaterally. Gait & Posture: Normal gait and fine motion.  PSYCHIATRIC: Patient alert, oriented x 3. Mood/Affect normal. Judgment/insight good as she makes appropriate decisions during today's exam.     Lab Results   Component Value Date    HGBA1C 6.3 (H) 05/04/2018     Insulin 2.6 - 24.9 uIU/mL 10.2          05/04/2018     Vit D, 25-Hydroxy 30.0 - 100.0 ng/mL 19.2          05/04/2018     Lab Results   Component Value Date    WBC 7.5 05/04/2018    HGB 13.2 05/04/2018    HCT 40.3 05/04/2018    MCV 89 05/04/2018     05/04/2018     CMP  Sodium   Date Value Ref Range Status   05/04/2018 140 134 - 144 mmol/L Final     Potassium   Date Value Ref Range Status   05/04/2018 4.0 3.5 - 5.2 mmol/L Final     Chloride   Date Value Ref Range Status   05/04/2018 102 96 - 106 mmol/L Final     CO2   Date Value Ref Range Status   05/04/2018 24 18 - 29 mmol/L Final     Glucose   Date Value Ref Range Status   05/04/2018 117 (H) 65 - 99 mg/dL Final     BUN, Bld   Date Value Ref Range Status   05/04/2018 9 6 - 24 mg/dL Final     Creatinine   Date Value Ref Range Status   05/04/2018 0.80 0.57 - 1.00 mg/dL Final     Calcium   Date Value Ref Range Status   05/04/2018 8.8 8.7 - 10.2 mg/dL Final     Total Protein   Date Value Ref Range Status   05/04/2018 7.3 6.0 - 8.5 g/dL Final     Albumin   Date Value Ref Range Status   05/04/2018 4.0 3.5 - 5.5 g/dL Final     Total Bilirubin   Date Value Ref Range Status   05/04/2018 0.4 0.0 - 1.2 mg/dL Final     Alkaline Phosphatase   Date Value Ref Range Status   05/04/2018 121 (H) 39 - 117 IU/L Final     AST   Date Value Ref Range Status   05/04/2018 17 0 - 40 IU/L Final     ALT   Date Value Ref Range Status   05/04/2018 20 0 - 32 IU/L Final     Anion  Gap   Date Value Ref Range Status   12/17/2014 6 (L) 8 - 16 mmol/L Final     eGFR if    Date Value Ref Range Status   12/17/2014 >60.0 >60 mL/min/1.73 m^2 Final     Lab Results   Component Value Date    TSH 2.880 05/04/2018     Lab Results   Component Value Date    LDLCALC 133 (H) 05/04/2018     HIV Screen 4th Generation wRfx Non Reactive        05/04/2018     RPR Non Reactive                05/04/2018     Hep C Virus Ab Signal/Cutoff 0.0 - 0.9 s/co ratio <0.1             05/04/2018     Hepatitis B Surface Ag Negative Negative        05/04/2018     Hep B S Ab Non Reactive                                     05/04/2018     ASSESSMENT:    ICD-10-CM ICD-9-CM    1. Annual physical exam Z00.00 V70.0    2. Pre-diabetes R73.03 790.29    3. Vitamin D deficiency E55.9 268.9    4. Morbid obesity with BMI of 45.0-49.9, adult E66.01 278.01     Z68.42 V85.42    5. Visit for screening mammogram Z12.31 V76.12 Mammo Digital Screening Bilat with CAD     PLAN:  1. Age-appropriate counseling-appropriate low-sodium, low-cholesterol, low carbohydrate diet and exercise daily, monthly breast self exam, annual wellness examination.   2. Patient advised to call for results.  3. Continue current medications (including take vitamin D weekly as directed).  4. Flu shot this fall.  5. Follow-up in about 6 months (around 11/9/2018) for prediabetes follow up. Patient advised may call prior to appointment for labs orders - A1c, Insulin, CMP.

## 2018-06-04 ENCOUNTER — HOSPITAL ENCOUNTER (OUTPATIENT)
Dept: RADIOLOGY | Facility: HOSPITAL | Age: 43
Discharge: HOME | End: 2018-06-04
Attending: FAMILY MEDICINE
Payer: COMMERCIAL

## 2018-06-04 VITALS — BODY MASS INDEX: 46.01 KG/M2 | HEIGHT: 62 IN | WEIGHT: 250 LBS

## 2018-06-04 DIAGNOSIS — Z12.31 VISIT FOR SCREENING MAMMOGRAM: ICD-10-CM

## 2018-06-04 PROCEDURE — 77067 SCR MAMMO BI INCL CAD: CPT | Mod: 26,,, | Performed by: RADIOLOGY

## 2018-06-04 PROCEDURE — 77063 BREAST TOMOSYNTHESIS BI: CPT | Mod: 26,,, | Performed by: RADIOLOGY

## 2018-06-04 PROCEDURE — 77067 SCR MAMMO BI INCL CAD: CPT | Mod: TC,PO

## 2018-09-01 RX ORDER — ERGOCALCIFEROL 1.25 MG/1
50000 CAPSULE ORAL
Qty: 4 CAPSULE | Refills: 2 | Status: SHIPPED | OUTPATIENT
Start: 2018-09-01 | End: 2019-02-15 | Stop reason: SDUPTHER

## 2018-09-26 ENCOUNTER — PATIENT MESSAGE (OUTPATIENT)
Dept: FAMILY MEDICINE | Facility: CLINIC | Age: 43
End: 2018-09-26

## 2018-10-13 RX ORDER — ACYCLOVIR 400 MG/1
TABLET ORAL
Qty: 21 TABLET | Refills: 0 | Status: SHIPPED | OUTPATIENT
Start: 2018-10-13 | End: 2019-08-23 | Stop reason: SDUPTHER

## 2018-10-18 ENCOUNTER — PATIENT MESSAGE (OUTPATIENT)
Dept: FAMILY MEDICINE | Facility: CLINIC | Age: 43
End: 2018-10-18

## 2018-10-18 DIAGNOSIS — R73.03 PRE-DIABETES: Primary | ICD-10-CM

## 2018-10-24 LAB
ALBUMIN SERPL-MCNC: 4.2 G/DL (ref 3.5–5.5)
ALBUMIN/GLOB SERPL: 1.4 {RATIO} (ref 1.2–2.2)
ALP SERPL-CCNC: 108 IU/L (ref 39–117)
ALT SERPL-CCNC: 14 IU/L (ref 0–32)
AST SERPL-CCNC: 14 IU/L (ref 0–40)
BILIRUB SERPL-MCNC: 0.4 MG/DL (ref 0–1.2)
BUN SERPL-MCNC: 11 MG/DL (ref 6–24)
BUN/CREAT SERPL: 13 (ref 9–23)
CALCIUM SERPL-MCNC: 9.4 MG/DL (ref 8.7–10.2)
CHLORIDE SERPL-SCNC: 105 MMOL/L (ref 96–106)
CO2 SERPL-SCNC: 24 MMOL/L (ref 20–29)
CREAT SERPL-MCNC: 0.85 MG/DL (ref 0.57–1)
EGFR IF AFRICAN AMERICAN: 99 ML/MIN/1.73
EST. GFR  (NON AFRICAN AMERICAN): 86 ML/MIN/1.73
GLOBULIN SER CALC-MCNC: 3 G/DL (ref 1.5–4.5)
GLUCOSE SERPL-MCNC: 143 MG/DL (ref 65–99)
HBA1C MFR BLD: 7 % (ref 4.8–5.6)
INSULIN SERPL-ACNC: 7.7 UIU/ML (ref 2.6–24.9)
POTASSIUM SERPL-SCNC: 4.3 MMOL/L (ref 3.5–5.2)
PROT SERPL-MCNC: 7.2 G/DL (ref 6–8.5)
SODIUM SERPL-SCNC: 137 MMOL/L (ref 134–144)

## 2018-10-25 ENCOUNTER — OFFICE VISIT (OUTPATIENT)
Dept: FAMILY MEDICINE | Facility: CLINIC | Age: 43
End: 2018-10-25
Payer: COMMERCIAL

## 2018-10-25 VITALS
DIASTOLIC BLOOD PRESSURE: 76 MMHG | SYSTOLIC BLOOD PRESSURE: 120 MMHG | WEIGHT: 250.88 LBS | HEART RATE: 94 BPM | HEIGHT: 62 IN | TEMPERATURE: 99 F | BODY MASS INDEX: 46.17 KG/M2 | RESPIRATION RATE: 17 BRPM | OXYGEN SATURATION: 97 %

## 2018-10-25 DIAGNOSIS — E66.01 MORBID OBESITY WITH BMI OF 45.0-49.9, ADULT: ICD-10-CM

## 2018-10-25 DIAGNOSIS — Z79.899 ON ANGIOTENSIN-CONVERTING ENZYME (ACE) INHIBITORS: ICD-10-CM

## 2018-10-25 DIAGNOSIS — Z23 NEED FOR HEPATITIS B VACCINATION: ICD-10-CM

## 2018-10-25 DIAGNOSIS — Z79.899 ON STATIN THERAPY DUE TO RISK OF FUTURE CARDIOVASCULAR EVENT: ICD-10-CM

## 2018-10-25 DIAGNOSIS — E11.9 NEW ONSET TYPE 2 DIABETES MELLITUS: ICD-10-CM

## 2018-10-25 DIAGNOSIS — Z23 NEED FOR PROPHYLACTIC VACCINATION AGAINST STREPTOCOCCUS PNEUMONIAE (PNEUMOCOCCUS): ICD-10-CM

## 2018-10-25 PROCEDURE — 99214 OFFICE O/P EST MOD 30 MIN: CPT | Mod: 25,S$GLB,, | Performed by: FAMILY MEDICINE

## 2018-10-25 PROCEDURE — 90472 IMMUNIZATION ADMIN EACH ADD: CPT | Mod: S$GLB,,, | Performed by: FAMILY MEDICINE

## 2018-10-25 PROCEDURE — 3045F PR MOST RECENT HEMOGLOBIN A1C LEVEL 7.0-9.0%: CPT | Mod: CPTII,S$GLB,, | Performed by: FAMILY MEDICINE

## 2018-10-25 PROCEDURE — 90732 PPSV23 VACC 2 YRS+ SUBQ/IM: CPT | Mod: S$GLB,,, | Performed by: FAMILY MEDICINE

## 2018-10-25 PROCEDURE — 90471 IMMUNIZATION ADMIN: CPT | Mod: S$GLB,,, | Performed by: FAMILY MEDICINE

## 2018-10-25 PROCEDURE — 3008F BODY MASS INDEX DOCD: CPT | Mod: CPTII,S$GLB,, | Performed by: FAMILY MEDICINE

## 2018-10-25 PROCEDURE — 99999 PR PBB SHADOW E&M-EST. PATIENT-LVL V: CPT | Mod: PBBFAC,,, | Performed by: FAMILY MEDICINE

## 2018-10-25 PROCEDURE — 90744 HEPB VACC 3 DOSE PED/ADOL IM: CPT | Mod: S$GLB,,, | Performed by: FAMILY MEDICINE

## 2018-10-25 RX ORDER — METFORMIN HYDROCHLORIDE 500 MG/1
500 TABLET ORAL 2 TIMES DAILY WITH MEALS
Qty: 60 TABLET | Refills: 2 | Status: SHIPPED | OUTPATIENT
Start: 2018-10-25 | End: 2020-11-18 | Stop reason: ALTCHOICE

## 2018-10-25 RX ORDER — LISINOPRIL 5 MG/1
5 TABLET ORAL DAILY
Qty: 30 TABLET | Refills: 2 | Status: SHIPPED | OUTPATIENT
Start: 2018-10-25 | End: 2019-10-03 | Stop reason: SDUPTHER

## 2018-10-25 RX ORDER — PRAVASTATIN SODIUM 10 MG/1
10 TABLET ORAL NIGHTLY
Qty: 30 TABLET | Refills: 2 | Status: SHIPPED | OUTPATIENT
Start: 2018-10-25 | End: 2019-10-03 | Stop reason: SDUPTHER

## 2018-10-25 RX ORDER — LANCETS
EACH MISCELLANEOUS
Qty: 100 EACH | Refills: 5 | Status: SHIPPED | OUTPATIENT
Start: 2018-10-25 | End: 2023-02-27

## 2018-10-25 RX ORDER — INSULIN PUMP SYRINGE, 3 ML
EACH MISCELLANEOUS
Qty: 1 EACH | Refills: 0 | Status: SHIPPED | OUTPATIENT
Start: 2018-10-25 | End: 2023-02-27

## 2018-10-25 NOTE — PROGRESS NOTES
Sophiaramón Cesar    Chief Complaint   Patient presents with    Prediabetes    Follow-up       History of Present Illness:   Ms. Cesar comes in today for 6-month pre diabetes follow-up.  She is fasting and has not taken medications today.  She states she has not been exercising and only monitors her diet a little.  She has never taken medication for pre diabetes.    She states she feels okay today.  However, she reports having chronic fatigue and denies having fever, chills, appetite changes; shortness of breath, cough, wheezing; chest pain, palpitations, leg swelling; abdominal pain, nausea, vomiting diarrhea, constipation; unusual urinary symptoms; polydipsia, polyuria, polyphagia; back pain; headache; anxiety, depression, homicidal or suicidal thoughts.    Labs:                  WBC                      7.5                 05/04/2018                 HGB                      13.2                05/04/2018                 HCT                      40.3                05/04/2018                 PLT                      300                 05/04/2018                 CHOL                     195                 05/04/2018                 TRIG                     105                 05/04/2018                 HDL                      41                  05/04/2018                 ALT                      14                  10/22/2018                 AST                      14                  10/22/2018                 NA                       137                 10/22/2018                 K                        4.3                 10/22/2018                 CL                       105                 10/22/2018                 CREATININE               0.85                10/22/2018                 BUN                      11                  10/22/2018                 CO2                      24                  10/22/2018                 TSH                      2.880               05/04/2018                  HGBA1C                   7.0 (H)             10/22/2018             Insulin                   7.7                        10/22/2018       LDLCALC                  133 (H)             05/04/2018                  Current Outpatient Medications   Medication Sig    acyclovir (ZOVIRAX) 400 MG tablet TAKE 1 TABLET(400 MG) BY MOUTH THREE TIMES DAILY    ergocalciferol (ERGOCALCIFEROL) 50,000 unit Cap Take 1 capsule (50,000 Units total) by mouth every 7 days.         Review of Systems   Constitutional: Positive for fatigue. Negative for activity change, appetite change, chills, fever and unexpected weight change.        Weight 113.6 kg (250 lb 7.1 oz) at May 10, 2018 visit.   Respiratory: Negative for cough, shortness of breath and wheezing.    Cardiovascular: Negative for chest pain, palpitations and leg swelling.   Gastrointestinal: Negative for abdominal pain, blood in stool, constipation, diarrhea and vomiting.   Endocrine: Negative for polydipsia, polyphagia and polyuria.        See history of present illness.   Genitourinary: Negative for difficulty urinating.   Musculoskeletal: Negative for arthralgias and back pain.   Neurological: Negative for headaches.   Psychiatric/Behavioral: Negative for dysphoric mood and suicidal ideas. The patient is not nervous/anxious.         Negative for homicidal ideas.       Objective:  Physical Exam   Constitutional: She is oriented to person, place, and time. She appears well-developed and well-nourished. No distress.   Pleasant.   Neck: Normal range of motion. Neck supple. No thyromegaly present.   Cardiovascular: Normal rate, regular rhythm, normal heart sounds and intact distal pulses.   No murmur heard.  Pulses:       Dorsalis pedis pulses are 3+ on the right side, and 3+ on the left side.        Posterior tibial pulses are 3+ on the right side, and 3+ on the left side.   Pulmonary/Chest: Effort normal and breath sounds normal. No respiratory distress. She has no wheezes.    Abdominal: Soft. Bowel sounds are normal. She exhibits no distension and no mass. There is no tenderness. There is no rebound and no guarding.   Musculoskeletal: Normal range of motion. She exhibits no edema or tenderness.   She is ambulatory without problems.    Feet:   Right Foot:   Protective Sensation: 5 sites tested. 5 sites sensed.   Skin Integrity: Negative for ulcer or skin breakdown.   Left Foot:   Protective Sensation: 5 sites tested. 5 sites sensed.   Skin Integrity: Negative for ulcer or skin breakdown.   Lymphadenopathy:     She has no cervical adenopathy.   Neurological: She is alert and oriented to person, place, and time.   Skin: She is not diaphoretic.   Psychiatric: She has a normal mood and affect. Her behavior is normal. Judgment and thought content normal.   Vitals reviewed.      ASSESSMENT:  1. New onset type 2 diabetes mellitus    2. On angiotensin-converting enzyme (ACE) inhibitors    3. On statin therapy due to risk of future cardiovascular event    4. Morbid obesity with BMI of 45.0-49.9, adult    5. Need for prophylactic vaccination against Streptococcus pneumoniae (pneumococcus)    6. Need for hepatitis B vaccination        PLAN:  Sophia was seen today for prediabetes and follow-up.    Diagnoses and all orders for this visit:    New onset type 2 diabetes mellitus  -     blood-glucose meter (FREESTYLE LITE METER) kit; Use as instructed for CONTOUR METER  -     blood sugar diagnostic (CONTOUR TEST STRIPS) Strp; Use twice daily with Contour Galion  -     lancets Misc; Use twice daily with Contour Meter  -     Ambulatory consult to Diabetic Education  -     metFORMIN (GLUCOPHAGE) 500 MG tablet; Take 1 tablet (500 mg total) by mouth 2 (two) times daily with meals.  -     Comprehensive metabolic panel; Future  -     Hemoglobin A1c; Future    On angiotensin-converting enzyme (ACE) inhibitors  -     lisinopril (PRINIVIL,ZESTRIL) 5 MG tablet; Take 1 tablet (5 mg total) by mouth once daily.  -      Comprehensive metabolic panel; Future    On statin therapy due to risk of future cardiovascular event  -     pravastatin (PRAVACHOL) 10 MG tablet; Take 1 tablet (10 mg total) by mouth every evening.  -     Comprehensive metabolic panel; Future    Morbid obesity with BMI of 45.0-49.9, adult    Need for prophylactic vaccination against Streptococcus pneumoniae (pneumococcus)  -     Pneumococcal Polysaccharide Vaccine (23 Valent) (SQ/IM)    Need for hepatitis B vaccination  -     Hepatitis B Vaccine (Pediatric/Adolescent) (3-Dose) (IM)       I discussed above lab results with patient.  As she has now become diabetic, medication therapy is recommended-metformin 500 mg twice daily with meals; medication precautions discussed with patient.  I also discussed with her need for preventive therapy with ACE inhibitor and/or ARB and statin therapies for diabetic renal protection and heart prevention respectively; medication precautions discussed with patient and patient agreed to start lisinopril 5 mg daily along with pravastatin 10 mg nightly.  Home glucose monitoring advised (twice daily-fasting and 2 hr postprandial) with glucose goal at .  Prescription for diabetic supplies given.  Annual eye and dental examinations advised.  Immunizations advised/discussed with patient.  She agreed to Pneumovax and hepatitis B vaccine series.  Patient declined flu shot as will get at work.  Continue current medications, follow low sodium, low cholesterol, low carb diet, daily walks.  Patient verbalized understanding of above discussion.  Work excuse for 10/25/2018-10/26/2018 with return 10/29/2018.  Follow-up in about 8 weeks (around 12/19/2018) for diabetes follow up with lab review and HBV #2 shot.

## 2018-10-25 NOTE — PATIENT INSTRUCTIONS
Please check glucose levels twice daily - morning (fasting) and 2-hours after meal.  GOAL - glucose of 70 - 150.    Thanks.

## 2018-10-25 NOTE — LETTER
October 25, 2018      [facility]  [address] [facility] [facility] [address]  Phone: [phone]       Patient: Sophia Cesar   YOB: 1975  Date of Visit: 10/25/2018    To Whom It May Concern:    Bright Cesar  was at [facility] on 10/25/2018. She may return to work on 10/29/2018 with no restrictions. If you have any questions or concerns, or if I can be of further assistance, please do not hesitate to contact me.    Sincerely,    Barbi Elkins MD

## 2018-10-26 ENCOUNTER — TELEPHONE (OUTPATIENT)
Dept: DIABETES | Facility: CLINIC | Age: 43
End: 2018-10-26

## 2018-10-30 ENCOUNTER — CLINICAL SUPPORT (OUTPATIENT)
Dept: DIABETES | Facility: CLINIC | Age: 43
End: 2018-10-30
Payer: COMMERCIAL

## 2018-10-30 VITALS — BODY MASS INDEX: 45.68 KG/M2 | WEIGHT: 248.25 LBS | HEIGHT: 62 IN

## 2018-10-30 DIAGNOSIS — E11.9 NEW ONSET TYPE 2 DIABETES MELLITUS: Primary | ICD-10-CM

## 2018-10-30 PROCEDURE — G0108 DIAB MANAGE TRN  PER INDIV: HCPCS | Mod: S$GLB,,, | Performed by: DIETITIAN, REGISTERED

## 2018-10-30 PROCEDURE — 99999 PR PBB SHADOW E&M-EST. PATIENT-LVL III: CPT | Mod: PBBFAC,,, | Performed by: DIETITIAN, REGISTERED

## 2018-10-30 NOTE — PATIENT INSTRUCTIONS
Avoid all beverages with sugar and fruit juice    Eat 3 meals per day, with 1-2 snacks    Check blood sugar twice daily - fasting and 2 hours after one meal    Walk on treadmill for 30 min 3-5 days/wk    Send blood sugar log via My Chart in 2 wks

## 2018-10-30 NOTE — LETTER
October 30, 2018        Barbi Elkins MD  [address]  [facility] [address]             [facility]  [id number] [place] [address]  Phone: [phone]  Fax: [phone]   Patient: Sophia Cesar   MR Number: [id number]   YOB: 1975   Date of Visit: 10/30/2018       Dear Dr. Elkins:    Thank you for referring Sophia Cesar to me for evaluation. Below are the relevant portions of my assessment and plan of care.      If you have questions, please do not hesitate to call me. I look forward to following Sophia along with you.    Sincerely,      Francisco Nuñez RD           CC  No Recipients

## 2018-10-30 NOTE — PROGRESS NOTES
Diabetes Education  Author: Francisco Nuñez RD  Date: 10/30/2018    Diabetes Care Management Summary  Diabetes Education Record Assessment/Progress: Initial  Current Diabetes Risk Level: Low     Referring Provider: Barbi Elkins MD  42 y.o. female in clinic today for diabetes education. Patient has not taken diabetes education courses in the past.    Lab Results   Component Value Date    HGBA1C 7.0 (H) 10/22/2018       Diabetes Type  Diabetes Type : Type II    Diabetes History  Diabetes Diagnosis: 0-1 year(dx 2018)  Current Treatment: Oral Medication, Diet  Reviewed Problem List with Patient: Yes  DM medication:   Metformin, 500 mg bid    Health Maintenance was reviewed today with patient. Discussed with patient importance of routine eye exams, foot exams/foot care, blood work (i.e.: A1c, microalbumin, and lipid), dental visits, yearly flu vaccine, and pneumonia vaccine as indicated by PCP. Patient verbalized understanding.     Health Maintenance Topics with due status: Not Due       Topic Last Completion Date    TETANUS VACCINE 2010    Lipid Panel 2018    Mammogram 2018    Hemoglobin A1c 10/22/2018    Pneumococcal PPSV23 (Medium Risk) 10/25/2018    Foot Exam 10/25/2018    Low Dose Statin 10/30/2018     Health Maintenance Due   Topic Date Due    Eye Exam  1987    Influenza Vaccine  2018       Nutrition  Meal Planning: skipping meals  What type of sweetener do you use?: sugar  Meal Plan 24 Hour Recall - Breakfast: 7:30a - cheese and 6 grapes, coffee w/sugar and nondairy creamer  Meal Plan 24 Hour Recall - Lunch: 11:45a - chicken, humus, water  Meal Plan 24 Hour Recall - Dinner: 8p - same as lunch  Meal Plan 24 Hour Recall - Snack: mid morning: cheese and coffee; mid afternoon: cheese and grapes    Monitoring   Monitoring: (has Relion meter)  Self Monitoring : checking bid since 4 days ago // fastin-173 // pp in 200's  Blood Glucose Logs: No  Do you use a personal  continuous glucose monitor?: No  In the last month, how often have you had a low blood sugar reaction?: never  Can you tell when your blood sugar is too high?: no    Exercise   Exercise Type: none    Current Diabetes Treatment   Current Treatment: Oral Medication, Diet    Social History  Preferred Learning Method: Face to Face  Primary Support: Self, Family  Educational Level: Some College  Occupation:  for State  Smoking Status: Never a Smoker  Alcohol Use: Rarely            DDS-2 Score  ( > 3 = SIGNIFICANT DISTRESS): 2.5                   Barriers to Change  Barriers to Change: None  Learning Challenges : None    Readiness to Learn   Readiness to Learn : Eager    Cultural Influences  Cultural Influences: No    Diabetes Education Assessment/Progress  Diabetes Disease Process (diabetes disease process and treatment options): Discussion, Individual Session, Demonstrates Understanding/Competency(verbalizes/demonstrates)  Nutrition (Incorporating nutritional management into one's lifestyle): Discussion, Instructed, Individual Session, Demonstrates Understanding/Competency (verbalizes/demonstrates), Written Materials Provided  Physical Activity (incorporating physical activity into one's lifestyle): Discussion, Individual Session, Demonstrates Understanding/Competency (verbalizes/demonstrates)  Medications (states correct name, dose, onset, peak, duration, side effects & timing of meds): Discussion, Individual Session, Demonstrates Understanding/Competency(verbalizes/demonstrates)  Monitoring (monitoring blood glucose/other parameters & using results): Discussion, Instructed, Demonstrates Understanding/Competency (verbalizes/demonstrates), Written Materials Provided, Individual Session  Acute Complications (preventing, detecting, and treating acute complications): Discussion, Instructed, Individual Session, Demonstrates Understanding/Competency (verbalizes/demonstrates), Written Materials Provided  Chronic  Complications (preventing, detecting, and treating chronic complications): Discussion, Individual Session, Demonstrates Understanding/Competency (verbalizes/demonstrates)  Clinical (diabetes, other pertinent medical history, and relevant comorbidities reviewed during visit): Discussion, Individual Session, Demonstrates Understanding/Competency (verbalizes/demonstrates)  Cognitive (knowledge of self-management skills, functional health literacy): Discussion, Individual Session, Demonstrates Understanding/Competency (verbalizes/demonstrates)  Psychosocial (emotional response to diabetes): Discussion, Individual Session, Demonstrates Understanding/Competency (verbalizes/demonstrates)  Diabetes Distress and Support Systems: Discussion, Individual Session, Demonstrates Understanding/Competency (verbalizes/demonstrates)  Behavioral (readiness for change, lifestyle practices, self-care behaviors): Discussion, Individual Session, Demonstrates Understanding/Competency (verbalizes/demonstrates)    Goals  Patient has selected/evaluated goals during today's session: Yes, selected  Healthy Eating: Set(Follow meal plan; avoid beverages with sugar)  Start Date: 10/30/18  Target Date: 01/30/19  Physical Activity: Set(30 min on treadmill 3-5 days/wk )  Start Date: 10/30/18  Target Date: 01/30/19  Monitoring: Set(check BG bid - fasting and pp)  Start Date: 10/30/18  Target Date: 01/30/19         Diabetes Care Plan/Intervention  Education Plan/Intervention: Individual Follow-Up DSMT   F/u in 3 months    Diabetes Meal Plan  Calories: 1600, 1800  Carbohydrate Per Meal: 30-45g  Carbohydrate Per Snack : 15-30g    Today's Self-Management Care Plan was developed with the patient's input and is based on barriers identified during today's assessment.    The long and short-term goals in the care plan were written with the patient/caregiver's input. The patient has agreed to work toward these goals to improve her overall diabetes control.       The patient received a copy of today's self-management plan and verbalized understanding of the care plan, goals, and all of today's instructions.      The patient was encouraged to communicate with her physician and care team regarding her condition(s) and treatment.  I provided the patient with my contact information today and encouraged her to contact me via phone or patient portal as needed.     Education Units of Time   Time Spent: 60 min

## 2018-12-17 ENCOUNTER — PATIENT MESSAGE (OUTPATIENT)
Dept: FAMILY MEDICINE | Facility: CLINIC | Age: 43
End: 2018-12-17

## 2018-12-18 LAB
ALBUMIN SERPL-MCNC: 4.1 G/DL (ref 3.5–5.5)
ALBUMIN/GLOB SERPL: 1.2 {RATIO} (ref 1.2–2.2)
ALP SERPL-CCNC: 112 IU/L (ref 39–117)
ALT SERPL-CCNC: 17 IU/L (ref 0–32)
AST SERPL-CCNC: 16 IU/L (ref 0–40)
BILIRUB SERPL-MCNC: 0.3 MG/DL (ref 0–1.2)
BUN SERPL-MCNC: 8 MG/DL (ref 6–24)
BUN/CREAT SERPL: 11 (ref 9–23)
CALCIUM SERPL-MCNC: 9.3 MG/DL (ref 8.7–10.2)
CHLORIDE SERPL-SCNC: 102 MMOL/L (ref 96–106)
CO2 SERPL-SCNC: 22 MMOL/L (ref 20–29)
CREAT SERPL-MCNC: 0.74 MG/DL (ref 0.57–1)
EGFR IF AFRICAN AMERICAN: 117 ML/MIN/1.73
EST. GFR  (NON AFRICAN AMERICAN): 102 ML/MIN/1.73
GLOBULIN SER CALC-MCNC: 3.4 G/DL (ref 1.5–4.5)
GLUCOSE SERPL-MCNC: 142 MG/DL (ref 65–99)
HBA1C MFR BLD: 6.8 % (ref 4.8–5.6)
POTASSIUM SERPL-SCNC: 4 MMOL/L (ref 3.5–5.2)
PROT SERPL-MCNC: 7.5 G/DL (ref 6–8.5)
SODIUM SERPL-SCNC: 140 MMOL/L (ref 134–144)

## 2018-12-20 ENCOUNTER — OFFICE VISIT (OUTPATIENT)
Dept: FAMILY MEDICINE | Facility: CLINIC | Age: 43
End: 2018-12-20
Payer: COMMERCIAL

## 2018-12-20 VITALS
SYSTOLIC BLOOD PRESSURE: 126 MMHG | RESPIRATION RATE: 18 BRPM | HEIGHT: 62 IN | BODY MASS INDEX: 44.95 KG/M2 | TEMPERATURE: 99 F | DIASTOLIC BLOOD PRESSURE: 78 MMHG | HEART RATE: 100 BPM | OXYGEN SATURATION: 100 % | WEIGHT: 244.25 LBS

## 2018-12-20 DIAGNOSIS — Z79.899 ON ANGIOTENSIN-CONVERTING ENZYME (ACE) INHIBITORS: ICD-10-CM

## 2018-12-20 DIAGNOSIS — Z23 NEED FOR HEPATITIS B VACCINATION: ICD-10-CM

## 2018-12-20 DIAGNOSIS — E11.9 CONTROLLED TYPE 2 DIABETES MELLITUS WITHOUT COMPLICATION, WITHOUT LONG-TERM CURRENT USE OF INSULIN: ICD-10-CM

## 2018-12-20 DIAGNOSIS — Z79.899 ON STATIN THERAPY DUE TO RISK OF FUTURE CARDIOVASCULAR EVENT: ICD-10-CM

## 2018-12-20 DIAGNOSIS — E66.01 MORBID OBESITY WITH BMI OF 40.0-44.9, ADULT: ICD-10-CM

## 2018-12-20 PROCEDURE — 99999 PR PBB SHADOW E&M-EST. PATIENT-LVL IV: CPT | Mod: PBBFAC,,, | Performed by: FAMILY MEDICINE

## 2018-12-20 PROCEDURE — 99214 OFFICE O/P EST MOD 30 MIN: CPT | Mod: 25,S$GLB,, | Performed by: FAMILY MEDICINE

## 2018-12-20 PROCEDURE — 3008F BODY MASS INDEX DOCD: CPT | Mod: CPTII,S$GLB,, | Performed by: FAMILY MEDICINE

## 2018-12-20 PROCEDURE — 3044F HG A1C LEVEL LT 7.0%: CPT | Mod: CPTII,S$GLB,, | Performed by: FAMILY MEDICINE

## 2018-12-20 PROCEDURE — 90744 HEPB VACC 3 DOSE PED/ADOL IM: CPT | Mod: S$GLB,,, | Performed by: FAMILY MEDICINE

## 2018-12-20 PROCEDURE — 90471 IMMUNIZATION ADMIN: CPT | Mod: S$GLB,,, | Performed by: FAMILY MEDICINE

## 2018-12-20 NOTE — PROGRESS NOTES
Sophia Lashay Cesar    Chief Complaint   Patient presents with    Follow-up       History of Present Illness:   Ms. Cesar comes in today for 2-month diabetes with lab follow up and HBV# 2 shot.  On October 15, 2018 I recommended she take Metformin 500 mg twice daily for new-onset diabetes mellitus, Lisinopril 5 mg daily for kidney protection, and Pravastatin 10 mg nightly for heart protection. She reports no problems with taking medications.  She sad diabetes support on October 30, 2018 with follow up scheduled for January 30, 2018.  She states monitors her diet and exercises a little more.  She states she performs home glucose checks twice daily with morning levels ranging 120-130's, after lunch levels ranging  and after dinner levels ranging 150.       She states she feels good today and reports no acute problems today. She denies fever, chills, fatigue, appetite change; shortness of breath, cough, wheezing; chest pain, palpitations, leg swelling; pain, nausea, vomiting, diarrhea, constipation; unusual urinary symptoms; polydipsia, polyuria, polyphagia; back pain; headaches; anxiety, depression, homicidal or suicidal thoughts.    Labs:                     WBC                      7.5                 05/04/2018                 HGB                      13.2                05/04/2018                 HCT                      40.3                05/04/2018                 PLT                      300                 05/04/2018                 CHOL                     195                 05/04/2018                 TRIG                     105                 05/04/2018                 HDL                      41                  05/04/2018                 ALT                      17                  12/17/2018                 AST                      16                  12/17/2018                 NA                       140                 12/17/2018                 K                        4.0                  12/17/2018                 CL                       102                 12/17/2018                 CREATININE               0.74                12/17/2018                 BUN                      8                   12/17/2018                 CO2                      22                  12/17/2018                 TSH                      2.880               05/04/2018                 HGBA1C                   6.8 (H)             12/17/2018             LDLCALC                  133 (H)             05/04/2018                Diabetes   She has type 2 diabetes mellitus. No MedicAlert identification noted. The initial diagnosis of diabetes was made 3 months ago. Hypoglycemia symptoms include hunger, mood changes and sleepiness. Pertinent negatives for hypoglycemia include no headaches, nervousness/anxiousness or sweats. Pertinent negatives for diabetes include no blurred vision, no chest pain, no fatigue, no foot paresthesias, no foot ulcerations, no polydipsia, no polyphagia, no polyuria, no visual change and no weight loss. Pertinent negatives for hypoglycemia complications include no blackouts, no hospitalization, no nocturnal hypoglycemia, no required assistance and no required glucagon injection. Symptoms are stable. Pertinent negatives for diabetic complications include no autonomic neuropathy, CVA, heart disease, impotence, nephropathy, peripheral neuropathy, PVD or retinopathy. Risk factors for coronary artery disease include obesity. Current diabetic treatment includes diet and oral agent (monotherapy). She is compliant with treatment most of the time. Her weight is stable. She is following a generally unhealthy diet. Meal planning includes carbohydrate counting. She has had a previous visit with a dietitian. She rarely participates in exercise. She monitors blood glucose at home 1-2 x per day. Blood glucose monitoring compliance is adequate. There is no change in her home blood glucose trend. She does not see a  podiatrist.Eye exam is current.       Current Outpatient Medications   Medication Sig    acyclovir (ZOVIRAX) 400 MG tablet TAKE 1 TABLET(400 MG) BY MOUTH THREE TIMES DAILY    blood sugar diagnostic (CONTOUR TEST STRIPS) Strp Use twice daily with Contour [place]    blood-glucose meter (FREESTYLE LITE METER) kit Use as instructed for CONTOUR METER    ergocalciferol (ERGOCALCIFEROL) 50,000 unit Cap Take 1 capsule (50,000 Units total) by mouth every 7 days.    lancets Misc Use twice daily with Contour Meter    lisinopril (PRINIVIL,ZESTRIL) 5 MG tablet Take 1 tablet (5 mg total) by mouth once daily.    metFORMIN (GLUCOPHAGE) 500 MG tablet Take 1 tablet (500 mg total) by mouth 2 (two) times daily with meals.    pravastatin (PRAVACHOL) 10 MG tablet Take 1 tablet (10 mg total) by mouth every evening.     No current facility-administered medications for this visit.        Review of Systems:  Review of Systems   Constitutional: Negative for activity change, appetite change, chills, fatigue, fever and weight loss.        Weight 113.8 kg (250 lb 14.1 oz) at October 25, 2018 visit.   Eyes: Negative for blurred vision.   Respiratory: Negative for cough, shortness of breath and wheezing.    Cardiovascular: Negative for chest pain, palpitations and leg swelling.   Gastrointestinal: Negative for abdominal pain, constipation, diarrhea, nausea and vomiting.   Endocrine: Negative for polydipsia, polyphagia and polyuria.        See history of present illness.   Genitourinary: Negative for difficulty urinating and impotence.   Musculoskeletal: Negative for back pain.   Neurological: Negative for headaches.   Psychiatric/Behavioral: Negative for dysphoric mood and suicidal ideas. The patient is not nervous/anxious.         Negative for homicidal ideas.       Objective:  Physical Exam   Constitutional: She is oriented to person, place, and time. She appears well-developed and well-nourished. No distress.   Pleasant.   Neck: Normal  range of motion. Neck supple. No thyromegaly present.   Cardiovascular: Normal rate, regular rhythm, normal heart sounds and intact distal pulses.   No murmur heard.  Pulses:       Dorsalis pedis pulses are 3+ on the right side, and 3+ on the left side.        Posterior tibial pulses are 3+ on the right side, and 3+ on the left side.   Pulmonary/Chest: Effort normal and breath sounds normal. No respiratory distress. She has no wheezes.   Abdominal: Soft. Bowel sounds are normal. She exhibits no distension and no mass. There is no tenderness. There is no rebound and no guarding.   Musculoskeletal: Normal range of motion. She exhibits no edema or tenderness.   She is ambulatory without problems.    Lymphadenopathy:     She has no cervical adenopathy.   Neurological: She is alert and oriented to person, place, and time.   Skin: She is not diaphoretic.   Psychiatric: She has a normal mood and affect. Her behavior is normal. Judgment and thought content normal.   Vitals reviewed.      ASSESSMENT:  1. Controlled type 2 diabetes mellitus without complication, without long-term current use of insulin    2. On statin therapy due to risk of future cardiovascular event    3. On angiotensin-converting enzyme (ACE) inhibitors    4. Morbid obesity with BMI of 40.0-44.9, adult    5. Need for hepatitis B vaccination        PLAN:  Sophia was seen today for follow-up.    Diagnoses and all orders for this visit:    Controlled type 2 diabetes mellitus without complication, without long-term current use of insulin    On statin therapy due to risk of future cardiovascular event    On angiotensin-converting enzyme (ACE) inhibitors    Morbid obesity with BMI of 40.0-44.9, adult    Need for hepatitis B vaccination  -     Hepatitis B Vaccine (Pediatric/Adolescent) (3-Dose) (IM)       Continue current medications, follow low sodium, low cholesterol, low carb diet, daily walks.  Keep follow up with specialists.  Follow-up in about 5 months  (around 5/10/2019) for SIGKAT w/HBV shot #3.

## 2019-01-03 ENCOUNTER — OFFICE VISIT (OUTPATIENT)
Dept: FAMILY MEDICINE | Facility: CLINIC | Age: 44
End: 2019-01-03
Payer: COMMERCIAL

## 2019-01-03 VITALS
HEIGHT: 62 IN | WEIGHT: 244.69 LBS | OXYGEN SATURATION: 98 % | SYSTOLIC BLOOD PRESSURE: 118 MMHG | HEART RATE: 106 BPM | DIASTOLIC BLOOD PRESSURE: 76 MMHG | BODY MASS INDEX: 45.03 KG/M2 | TEMPERATURE: 98 F

## 2019-01-03 DIAGNOSIS — J32.9 SINOBRONCHITIS: Primary | ICD-10-CM

## 2019-01-03 DIAGNOSIS — J40 SINOBRONCHITIS: Primary | ICD-10-CM

## 2019-01-03 PROCEDURE — 3008F BODY MASS INDEX DOCD: CPT | Mod: CPTII,S$GLB,, | Performed by: REGISTERED NURSE

## 2019-01-03 PROCEDURE — 99214 PR OFFICE/OUTPT VISIT, EST, LEVL IV, 30-39 MIN: ICD-10-PCS | Mod: S$GLB,,, | Performed by: REGISTERED NURSE

## 2019-01-03 PROCEDURE — 99999 PR PBB SHADOW E&M-EST. PATIENT-LVL III: ICD-10-PCS | Mod: PBBFAC,,, | Performed by: REGISTERED NURSE

## 2019-01-03 PROCEDURE — 99214 OFFICE O/P EST MOD 30 MIN: CPT | Mod: S$GLB,,, | Performed by: REGISTERED NURSE

## 2019-01-03 PROCEDURE — 99999 PR PBB SHADOW E&M-EST. PATIENT-LVL III: CPT | Mod: PBBFAC,,, | Performed by: REGISTERED NURSE

## 2019-01-03 PROCEDURE — 3008F PR BODY MASS INDEX (BMI) DOCUMENTED: ICD-10-PCS | Mod: CPTII,S$GLB,, | Performed by: REGISTERED NURSE

## 2019-01-03 RX ORDER — PROMETHAZINE HYDROCHLORIDE AND DEXTROMETHORPHAN HYDROBROMIDE 6.25; 15 MG/5ML; MG/5ML
5 SYRUP ORAL
Qty: 118 ML | Refills: 0 | Status: SHIPPED | OUTPATIENT
Start: 2019-01-03 | End: 2019-01-21 | Stop reason: SDUPTHER

## 2019-01-03 RX ORDER — AMOXICILLIN AND CLAVULANATE POTASSIUM 875; 125 MG/1; MG/1
1 TABLET, FILM COATED ORAL 2 TIMES DAILY
Qty: 20 TABLET | Refills: 0 | Status: SHIPPED | OUTPATIENT
Start: 2019-01-03 | End: 2019-01-13

## 2019-01-03 NOTE — PROGRESS NOTES
Subjective:       Patient ID: Sophia Cesar is a 43 y.o. female.    Chief Complaint: Chest Congestion and Cough      HPI    Sophia Cesar is here today with c/o illness ~ 2 to 3 weeks.  OTC medication not helping.  Reports RN, NC, PND, sinus pressure with HA pain.  Reports productive cough with green sputum, SOB and wheezing.      Review of Systems   Constitutional: Positive for chills. Negative for fever.   HENT: Positive for congestion, postnasal drip, rhinorrhea, sinus pain and sore throat. Negative for ear pain.    Eyes: Negative.    Respiratory: Positive for cough, chest tightness, shortness of breath and wheezing.    Neurological: Positive for headaches. Negative for light-headedness.   Hematological: Negative for adenopathy.       Review of patient's allergies indicates:  No Known Allergies    Patient Active Problem List   Diagnosis    Anxiety and depression    TMJ (dislocation of temporomandibular joint)    Morbid obesity with BMI of 40.0-44.9, adult    Vitamin D deficiency    On angiotensin-converting enzyme (ACE) inhibitors    On statin therapy due to risk of future cardiovascular event       Current Outpatient Medications on File Prior to Visit   Medication Sig Dispense Refill    acyclovir (ZOVIRAX) 400 MG tablet TAKE 1 TABLET(400 MG) BY MOUTH THREE TIMES DAILY 21 tablet 0    ergocalciferol (ERGOCALCIFEROL) 50,000 unit Cap Take 1 capsule (50,000 Units total) by mouth every 7 days. 4 capsule 2    lisinopril (PRINIVIL,ZESTRIL) 5 MG tablet Take 1 tablet (5 mg total) by mouth once daily. 30 tablet 2    metFORMIN (GLUCOPHAGE) 500 MG tablet Take 1 tablet (500 mg total) by mouth 2 (two) times daily with meals. 60 tablet 2    pravastatin (PRAVACHOL) 10 MG tablet Take 1 tablet (10 mg total) by mouth every evening. 30 tablet 2     No current facility-administered medications on file prior to visit.        Past medical, surgical, family and social histories have been reviewed  "today.        Objective:     Vitals:    01/03/19 1419   BP: 118/76   Pulse: 106   Temp: 98.4 °F (36.9 °C)   TempSrc: Oral   SpO2: 98%   Weight: 111 kg (244 lb 11.4 oz)   Height: 5' 2" (1.575 m)   PainSc:   8   PainLoc: Chest         Physical Exam   Constitutional: She is oriented to person, place, and time. She appears well-developed and well-nourished.   HENT:   Head: Normocephalic and atraumatic.   Right Ear: Tympanic membrane normal.   Left Ear: Tympanic membrane normal.   Nose: Mucosal edema and rhinorrhea present. Right sinus exhibits maxillary sinus tenderness. Left sinus exhibits maxillary sinus tenderness.   Mouth/Throat: Oropharynx is clear and moist.   Eyes: Conjunctivae are normal. Pupils are equal, round, and reactive to light.   Cardiovascular: Regular rhythm and normal heart sounds. Tachycardia present.   Pulmonary/Chest: Effort normal. No respiratory distress. She has wheezes. She has no rales. She exhibits no tenderness.   Lymphadenopathy:     She has no cervical adenopathy.   Neurological: She is alert and oriented to person, place, and time.   Vitals reviewed.        Diagnosis       1. Sinobronchitis          Assessment/ Plan     Sinobronchitis  -     amoxicillin-clavulanate 875-125mg (AUGMENTIN) 875-125 mg per tablet; Take 1 tablet by mouth 2 (two) times daily. for 10 days  Dispense: 20 tablet; Refill: 0  -     promethazine-dextromethorphan (PROMETHAZINE-DM) 6.25-15 mg/5 mL Syrp; Take 5 mLs by mouth every 4 to 6 hours as needed.  Dispense: 118 mL; Refill: 0        Symptoms x 2 to 3 weeks, worsening.  Medication discussed, take as directed.  Rest & fluids.  Follow-up in clinic as needed.      ALYSSA Stark  [facility]   "

## 2019-01-17 ENCOUNTER — OFFICE VISIT (OUTPATIENT)
Dept: FAMILY MEDICINE | Facility: CLINIC | Age: 44
End: 2019-01-17
Payer: COMMERCIAL

## 2019-01-17 VITALS
DIASTOLIC BLOOD PRESSURE: 72 MMHG | SYSTOLIC BLOOD PRESSURE: 134 MMHG | RESPIRATION RATE: 17 BRPM | HEIGHT: 62 IN | HEART RATE: 104 BPM | BODY MASS INDEX: 45.44 KG/M2 | TEMPERATURE: 99 F | OXYGEN SATURATION: 98 % | WEIGHT: 246.94 LBS

## 2019-01-17 DIAGNOSIS — R05.9 COUGH: Primary | ICD-10-CM

## 2019-01-17 PROCEDURE — 99213 PR OFFICE/OUTPT VISIT, EST, LEVL III, 20-29 MIN: ICD-10-PCS | Mod: S$GLB,,, | Performed by: FAMILY MEDICINE

## 2019-01-17 PROCEDURE — 99999 PR PBB SHADOW E&M-EST. PATIENT-LVL III: CPT | Mod: PBBFAC,,, | Performed by: FAMILY MEDICINE

## 2019-01-17 PROCEDURE — 99999 PR PBB SHADOW E&M-EST. PATIENT-LVL III: ICD-10-PCS | Mod: PBBFAC,,, | Performed by: FAMILY MEDICINE

## 2019-01-17 PROCEDURE — 99213 OFFICE O/P EST LOW 20 MIN: CPT | Mod: S$GLB,,, | Performed by: FAMILY MEDICINE

## 2019-01-17 PROCEDURE — 3008F PR BODY MASS INDEX (BMI) DOCUMENTED: ICD-10-PCS | Mod: CPTII,S$GLB,, | Performed by: FAMILY MEDICINE

## 2019-01-17 PROCEDURE — 3008F BODY MASS INDEX DOCD: CPT | Mod: CPTII,S$GLB,, | Performed by: FAMILY MEDICINE

## 2019-01-17 NOTE — PROGRESS NOTES
Sophia Lashay Cesar    Chief Complaint   Patient presents with    Cough    Follow-up       History of Present Illness:   Ms. Cesar comes in today for the follow-up.  She saw NP Julisa on January 3, 2019 for sign of bronchitis and treated with Augmentin, Promethazine-DM is with help.  However, she states on Sunday and Monday night as she laid in bed she had a dry cough with chest tightness but now feels better as she no longer has symptoms like that.  She states she continues to have slight cough but much better.  Otherwise, she denies having any other symptoms today.          Current Outpatient Medications   Medication Sig    acyclovir (ZOVIRAX) 400 MG tablet TAKE 1 TABLET(400 MG) BY MOUTH THREE TIMES DAILY    blood sugar diagnostic (CONTOUR TEST STRIPS) Strp Use twice daily with Contour [place]    blood-glucose meter (FREESTYLE LITE METER) kit Use as instructed for CONTOUR METER    ergocalciferol (ERGOCALCIFEROL) 50,000 unit Cap Take 1 capsule (50,000 Units total) by mouth every 7 days.    lancets Misc Use twice daily with Contour Meter    lisinopril (PRINIVIL,ZESTRIL) 5 MG tablet Take 1 tablet (5 mg total) by mouth once daily.    metFORMIN (GLUCOPHAGE) 500 MG tablet Take 1 tablet (500 mg total) by mouth 2 (two) times daily with meals.    pravastatin (PRAVACHOL) 10 MG tablet Take 1 tablet (10 mg total) by mouth every evening.    promethazine-dextromethorphan (PROMETHAZINE-DM) 6.25-15 mg/5 mL Syrp Take 5 mLs by mouth every 4 to 6 hours as needed.       Review of Systems   Constitutional: Negative for appetite change, chills, fatigue and fever.   HENT:        See history of present illness.   Respiratory: Positive for cough. Negative for shortness of breath and wheezing.          See history of present illness.   Cardiovascular: Negative for chest pain, palpitations and leg swelling.   Gastrointestinal: Negative for abdominal pain, constipation, diarrhea, nausea and vomiting.   Genitourinary: Negative  for difficulty urinating.   Musculoskeletal: Negative for myalgias.   Neurological: Negative for headaches.       Objective:  Physical Exam   Constitutional: She is oriented to person, place, and time. She appears well-developed and well-nourished. No distress.   Pleasant.   Neck: Normal range of motion. Neck supple. No thyromegaly present.   Cardiovascular: Normal rate, regular rhythm, normal heart sounds and intact distal pulses.   No murmur heard.  Pulses:       Dorsalis pedis pulses are 3+ on the right side, and 3+ on the left side.        Posterior tibial pulses are 3+ on the right side, and 3+ on the left side.   Pulmonary/Chest: Effort normal. No respiratory distress. She has wheezes.   With rare wheeze noted.   Abdominal: Soft. Bowel sounds are normal. She exhibits no distension and no mass. There is no tenderness. There is no rebound and no guarding.   Musculoskeletal: Normal range of motion. She exhibits no edema or tenderness.   She is ambulatory without problems.    Lymphadenopathy:     She has no cervical adenopathy.   Neurological: She is alert and oriented to person, place, and time.   Skin: She is not diaphoretic.   Psychiatric: She has a normal mood and affect. Her behavior is normal. Judgment and thought content normal.   Vitals reviewed.      ASSESSMENT:  1. Cough        PLAN:  Sophia was seen today for cough and follow-up.    Diagnoses and all orders for this visit:    Cough    Continue current medications (including Promethazine-DM or Mucinex-DM as directed for cough), follow low sodium, low cholesterol, low carb diet, daily walks.  Follow-up if symptoms worsen or fail to improve.

## 2019-01-21 DIAGNOSIS — J32.9 SINOBRONCHITIS: ICD-10-CM

## 2019-01-21 DIAGNOSIS — J40 SINOBRONCHITIS: ICD-10-CM

## 2019-01-21 RX ORDER — PROMETHAZINE HYDROCHLORIDE AND DEXTROMETHORPHAN HYDROBROMIDE 6.25; 15 MG/5ML; MG/5ML
SYRUP ORAL
Qty: 118 ML | Refills: 0 | Status: SHIPPED | OUTPATIENT
Start: 2019-01-21 | End: 2019-08-01

## 2019-01-22 ENCOUNTER — PATIENT OUTREACH (OUTPATIENT)
Dept: ADMINISTRATIVE | Facility: HOSPITAL | Age: 44
End: 2019-01-22

## 2019-01-22 NOTE — PROGRESS NOTES
I have contacted patient to schedule dm eye exam. Patient said she had an eye exam 1-2018 at Dr. Kendell Cueto's office. Report requested.

## 2019-02-18 RX ORDER — ERGOCALCIFEROL 1.25 MG/1
50000 CAPSULE ORAL
Qty: 4 CAPSULE | Refills: 2 | Status: SHIPPED | OUTPATIENT
Start: 2019-02-18 | End: 2020-11-18 | Stop reason: ALTCHOICE

## 2019-02-19 ENCOUNTER — OFFICE VISIT (OUTPATIENT)
Dept: PODIATRY | Facility: CLINIC | Age: 44
End: 2019-02-19
Payer: COMMERCIAL

## 2019-02-19 VITALS
WEIGHT: 245.13 LBS | SYSTOLIC BLOOD PRESSURE: 123 MMHG | BODY MASS INDEX: 45.11 KG/M2 | HEIGHT: 62 IN | DIASTOLIC BLOOD PRESSURE: 81 MMHG | HEART RATE: 86 BPM

## 2019-02-19 DIAGNOSIS — L60.0 ONYCHOCRYPTOSIS: Primary | ICD-10-CM

## 2019-02-19 PROCEDURE — 99999 PR PBB SHADOW E&M-EST. PATIENT-LVL III: ICD-10-PCS | Mod: PBBFAC,,, | Performed by: PODIATRIST

## 2019-02-19 PROCEDURE — 99203 PR OFFICE/OUTPT VISIT, NEW, LEVL III, 30-44 MIN: ICD-10-PCS | Mod: S$GLB,,, | Performed by: PODIATRIST

## 2019-02-19 PROCEDURE — 3008F PR BODY MASS INDEX (BMI) DOCUMENTED: ICD-10-PCS | Mod: CPTII,S$GLB,, | Performed by: PODIATRIST

## 2019-02-19 PROCEDURE — 99203 OFFICE O/P NEW LOW 30 MIN: CPT | Mod: S$GLB,,, | Performed by: PODIATRIST

## 2019-02-19 PROCEDURE — 99999 PR PBB SHADOW E&M-EST. PATIENT-LVL III: CPT | Mod: PBBFAC,,, | Performed by: PODIATRIST

## 2019-02-19 PROCEDURE — 3008F BODY MASS INDEX DOCD: CPT | Mod: CPTII,S$GLB,, | Performed by: PODIATRIST

## 2019-02-19 NOTE — LETTER
February 19, 2019               [facility]  Podiatry  [address] [facility]  [place] [address]  Phone: [phone]  Fax: [phone]   February 19, 2019     Patient: Sophia Cesar   YOB: 1975   Date of Visit: 2/19/2019       To Whom it May Concern:    Sophia Cesar was seen in my clinic on 2/19/2019. She may return to work on 2/20/2019.    If you have any questions or concerns, please don't hesitate to call.    Sincerely,         Anita Mosqueda MA

## 2019-02-19 NOTE — PROGRESS NOTES
PODIATRIC MEDICINE AND SURGERY   2/19/2019    Reason for Visit   Chief Complaint   Patient presents with    Ingrown Toenail     Possible ingrown nail on right hallux, medial border. Pt states pain and rates 5/10         HPI  This is Sophia Cesar is a 43 y.o. female who presents today complaining of painful ingrown toenail. Pt states painful toenail for weeks.  Previous treatment includes none. Patient denies other pedal complaints at this time. Denies any N/V/F/C/SOB/CP.     PMH  Patient Active Problem List    Diagnosis Date Noted    On angiotensin-converting enzyme (ACE) inhibitors 10/25/2018    On statin therapy due to risk of future cardiovascular event 10/25/2018    Vitamin D deficiency 07/07/2016    Morbid obesity with BMI of 40.0-44.9, adult 04/01/2016    TMJ (dislocation of temporomandibular joint) 09/29/2015    Anxiety and depression 02/21/2014     Past Medical History:   Diagnosis Date    Anemia during pregnancy     Diabetes mellitus, type 2     HSV-1 infection     HSV-2 infection     Obesity     Pre-diabetes 4/4/2016    Premature surgical menopause     No history of abnormal pap smear    Vitamin D deficiency 4/4/2016       MEDS  Current Outpatient Medications on File Prior to Visit   Medication Sig Dispense Refill    acyclovir (ZOVIRAX) 400 MG tablet TAKE 1 TABLET(400 MG) BY MOUTH THREE TIMES DAILY 21 tablet 0    blood sugar diagnostic (CONTOUR TEST STRIPS) Strp Use twice daily with Contour [place] 100 each 5    blood-glucose meter (FREESTYLE LITE METER) kit Use as instructed for CONTOUR METER 1 each 0    ergocalciferol (ERGOCALCIFEROL) 50,000 unit Cap Take 1 capsule (50,000 Units total) by mouth every 7 days. 4 capsule 2    lancets Misc Use twice daily with Contour Meter 100 each 5    lisinopril (PRINIVIL,ZESTRIL) 5 MG tablet Take 1 tablet (5 mg total) by mouth once daily. 30 tablet 2    metFORMIN (GLUCOPHAGE) 500 MG tablet Take 1 tablet (500 mg total) by mouth 2 (two)  times daily with meals. 60 tablet 2    pravastatin (PRAVACHOL) 10 MG tablet Take 1 tablet (10 mg total) by mouth every evening. 30 tablet 2    promethazine-dextromethorphan (PROMETHAZINE-DM) 6.25-15 mg/5 mL Syrp TAKE 5 ML BY MOUTH EVERY 4 TO 6 HOURS AS NEEDED 118 mL 0     No current facility-administered medications on file prior to visit.        PSH     Past Surgical History:   Procedure Laterality Date    HYSTERECTOMY      PARTIAL HYSTERECTOMY  2006    Due to DUB after delivery        ALL  Review of patient's allergies indicates:  No Known Allergies    SOC     Social History     Tobacco Use    Smoking status: Never Smoker    Smokeless tobacco: Never Used   Substance Use Topics    Alcohol use: Yes     Alcohol/week: 0.0 oz     Frequency: Monthly or less     Drinks per session: 1 or 2     Binge frequency: Never     Comment: Rarely    Drug use: No       Family HX  Family History   Problem Relation Age of Onset    Hypertension Mother     Cancer Maternal Aunt         Breast cancer    Hypertension Maternal Aunt     Breast cancer Maternal Aunt     Cancer Maternal Grandmother         Pancreatic cancer    Stroke Maternal Aunt     Hypertension Maternal Aunt     Diabetes Maternal Aunt     Hypertension Maternal Aunt     Hypertension Maternal Aunt     Glaucoma Sister     No Known Problems Daughter     No Known Problems Son     Diabetes Paternal Uncle     Heart disease Neg Hx           REVIEW OF SYSTEMS   General: This patient is well-developed, well-nourished and appears stated age, well-oriented to person, place and time, and cooperative and pleasant on today's visit  Constitutional: Negative for chills and fever.   Respiratory: Negative for shortness of breath.    Cardiovascular: Negative for chest pain, palpitations, orthopnea  Gastrointestinal: Negative for diarrhea, nausea and vomiting.   Musculoskeletal: Positive for above noted in HPI  Skin: Positive for nail changes   Peripheral Vascular: no  "claudication or cyanosis  Psychiatric/Behavioral: Negative for altered mental status       Vitals:    02/19/19 0751   BP: 123/81   Pulse: 86   Weight: 111.2 kg (245 lb 2.4 oz)   Height: 5' 2" (1.575 m)       LOWER EXTREMITY PHYSICAL EXAM    VASCULAR  Dorsalis pedis and posterior tibial pulses palpable 2/4 bilaterally.   Capillary refill time immediate to the toes.   Feet are warm to the touch. Skin temperature warm to warm from proximally to distally   There are no ecchymoses noted to bilateral foot and ankle regions.   There is no  edema noted to foot     DERMATOLOGIC  Skin moist with healthy texture and turgor.  Incurvated RIGHT hallux medial border without erythema or drainage.   There are no open ulcerations, lacerations, or fissures to bilateral foot and ankle regions.   There are no hyperkeratotic lesions noted to feet. Nails are well-trimmed.    NEUROLOGIC  Epicritic sensation is intact as the patient is able to sense light touch to bilateral foot and ankle regions.   Achilles and patellar deep tendon reflexes intact  Babinski reflex absent    ORTHOPEDIC/BIOMECHANICAL  Tenderness to palpation medial DISTAL  border of RIGHT hallux.   Muscle strength AT/EHL/EDL/PT: 5/5; Achilles/Gastroc/Soleus: 5/5; PB/PL: 5/5 Muscle tone is normal.  Ankle joint ROM INTACT DF/PF, non-crepitus    ASSESSMENT  1. Onychocryptosis , Right hallux     PLAN    -Discussed presenting problems, etiology, pathologic processes and management options with patient today.     Utilizing sterile toenail clippers I aggressively trimmed the offending nail border approximately 3 mm from its edge and carried the nail plate incision down at an angle in order to wedge out the offending cryptotic portion of the nail plate. The offending border was then removed in toto. The remaining nail was grinded down with an electric  down to nail bed. Minimal blood was drawn. Applied betadine and covered with band-aid. Patient tolerated the procedure well " and related significant relief.        Future Appointments   Date Time Provider Department Center   5/10/2019  8:30 AM Barbi Elkins MD [facility]       Report Electronically Signed By:     Laurie Claros DPM   Podiatric Medicine & Surgery  [facility] [place] [facility]  2/19/2019

## 2019-07-11 ENCOUNTER — PATIENT MESSAGE (OUTPATIENT)
Dept: FAMILY MEDICINE | Facility: CLINIC | Age: 44
End: 2019-07-11

## 2019-08-01 ENCOUNTER — OFFICE VISIT (OUTPATIENT)
Dept: FAMILY MEDICINE | Facility: CLINIC | Age: 44
End: 2019-08-01
Payer: COMMERCIAL

## 2019-08-01 VITALS
HEIGHT: 62 IN | HEART RATE: 96 BPM | OXYGEN SATURATION: 99 % | WEIGHT: 231.5 LBS | BODY MASS INDEX: 42.6 KG/M2 | DIASTOLIC BLOOD PRESSURE: 70 MMHG | SYSTOLIC BLOOD PRESSURE: 116 MMHG | TEMPERATURE: 98 F | RESPIRATION RATE: 16 BRPM

## 2019-08-01 DIAGNOSIS — K62.5 ANAL BLEEDING: ICD-10-CM

## 2019-08-01 DIAGNOSIS — R19.7 DIARRHEA, UNSPECIFIED TYPE: ICD-10-CM

## 2019-08-01 LAB
CTP QC/QA: YES
FECAL OCCULT BLOOD, POC: NEGATIVE

## 2019-08-01 PROCEDURE — 3008F BODY MASS INDEX DOCD: CPT | Mod: CPTII,S$GLB,, | Performed by: FAMILY MEDICINE

## 2019-08-01 PROCEDURE — 82270 OCCULT BLOOD FECES: CPT | Mod: S$GLB,,, | Performed by: FAMILY MEDICINE

## 2019-08-01 PROCEDURE — 99999 PR PBB SHADOW E&M-EST. PATIENT-LVL III: CPT | Mod: PBBFAC,,, | Performed by: FAMILY MEDICINE

## 2019-08-01 PROCEDURE — 3008F PR BODY MASS INDEX (BMI) DOCUMENTED: ICD-10-PCS | Mod: CPTII,S$GLB,, | Performed by: FAMILY MEDICINE

## 2019-08-01 PROCEDURE — 99213 OFFICE O/P EST LOW 20 MIN: CPT | Mod: 25,S$GLB,, | Performed by: FAMILY MEDICINE

## 2019-08-01 PROCEDURE — 82270 POCT OCCULT BLOOD STOOL: ICD-10-PCS | Mod: S$GLB,,, | Performed by: FAMILY MEDICINE

## 2019-08-01 PROCEDURE — 99213 PR OFFICE/OUTPT VISIT, EST, LEVL III, 20-29 MIN: ICD-10-PCS | Mod: 25,S$GLB,, | Performed by: FAMILY MEDICINE

## 2019-08-01 PROCEDURE — 99999 PR PBB SHADOW E&M-EST. PATIENT-LVL III: ICD-10-PCS | Mod: PBBFAC,,, | Performed by: FAMILY MEDICINE

## 2019-08-01 NOTE — PROGRESS NOTES
Sophiaramón Cesar    Chief Complaint   Patient presents with    Diarrhea    Rectal Bleeding       History of Present Illness:   Ms. Cesar comes in today with complaint of having diarrhea since Tuesday evening.  She states diarrhea stopped on yesterday morning; however, she admits she has not had passage of stool since yesterday morning.  She states she noticed mucus and blood in stool, in toilet, and on toilet paper.  She states she saw less blood and mucus each time she passed stool.  She states she is not sure if she ate something that did not agree with her.  She denies consumption of red foods.  She states a co-worker left work on Monday not feeling well but she is not sure if co-worker had similar symptoms.    Otherwise, she denies having headaches, back pain, unusual urinary symptoms, abdominal pain, nausea, vomiting, constipation, chest pain, palpitations, leg swelling, shortness of breath, cough, wheezing, fever, chills, fatigue, appetite change.    She states glucose levels vary based on which she eats.    She states she has a cousin who has Crohn's disease.  Otherwise, she reports no known history of colon cancer.            Current Outpatient Medications   Medication Sig    acyclovir (ZOVIRAX) 400 MG tablet TAKE 1 TABLET(400 MG) BY MOUTH THREE TIMES DAILY    blood sugar diagnostic (CONTOUR TEST STRIPS) Strp Use twice daily with Contour [place]    blood-glucose meter (FREESTYLE LITE METER) kit Use as instructed for CONTOUR METER    ergocalciferol (ERGOCALCIFEROL) 50,000 unit Cap Take 1 capsule (50,000 Units total) by mouth every 7 days.    lancets Misc Use twice daily with Contour Meter    lisinopril (PRINIVIL,ZESTRIL) 5 MG tablet Take 1 tablet (5 mg total) by mouth once daily.    metFORMIN (GLUCOPHAGE) 500 MG tablet Take 1 tablet (500 mg total) by mouth 2 (two) times daily with meals.    pravastatin (PRAVACHOL) 10 MG tablet Take 1 tablet (10 mg total) by mouth every evening.     Review of  Systems   Constitutional: Negative for activity change, appetite change, chills, fatigue and fever.   Respiratory: Negative for cough, shortness of breath and wheezing.    Cardiovascular: Negative for chest pain, palpitations and leg swelling.   Gastrointestinal: Positive for anal bleeding and diarrhea. Negative for abdominal pain, constipation, nausea and vomiting.   Endocrine:        See history of present illness.   Genitourinary: Negative for difficulty urinating.   Musculoskeletal: Negative for back pain.   Neurological: Negative for headaches.   Psychiatric/Behavioral: Negative for dysphoric mood and suicidal ideas. The patient is not nervous/anxious.         Negative for homicidal ideas.       Objective:  Physical Exam   Constitutional: She is oriented to person, place, and time. She appears well-developed and well-nourished. No distress.   Pleasant.   Neck: Normal range of motion. Neck supple. No thyromegaly present.   Cardiovascular: Normal rate, regular rhythm, normal heart sounds and intact distal pulses.   No murmur heard.  Pulses:       Dorsalis pedis pulses are 3+ on the right side, and 3+ on the left side.        Posterior tibial pulses are 3+ on the right side, and 3+ on the left side.   Pulmonary/Chest: Effort normal and breath sounds normal. No respiratory distress. She has no wheezes.   Abdominal: Soft. Bowel sounds are normal. She exhibits no distension and no mass. There is no tenderness. There is no rebound and no guarding.   Genitourinary: Rectum normal. Rectal exam shows no external hemorrhoid, no internal hemorrhoid, no fissure, no mass, no tenderness, anal tone normal and guaiac negative stool.   Musculoskeletal: Normal range of motion. She exhibits no edema or tenderness.   She is ambulatory without problems.    Lymphadenopathy:     She has no cervical adenopathy.   Neurological: She is alert and oriented to person, place, and time.   Skin: She is not diaphoretic.   Psychiatric: She has a  normal mood and affect. Her behavior is normal. Judgment and thought content normal.   Vitals reviewed.      ASSESSMENT:  1. Diarrhea, unspecified type    2. Anal bleeding        PLAN:  Sophia was seen today for diarrhea and rectal bleeding.    Diagnoses and all orders for this visit:    Diarrhea, unspecified type    Anal bleeding  -     POCT Occult Blood Stool       Reassurance; however, patient advised to continue to monitor and symptoms reoccur/persists, follow up with me for further evaluation and possibly colonoscopy.  Continue current medications, follow low sodium, low cholesterol, low carb diet, daily walks.  Work excuse for today with return tomorrow.  Follow up if symptoms worsen or fail to improve.

## 2019-08-01 NOTE — LETTER
August 1, 2019        Sophia Cesar             [facility]  [address]  [place] [address]  Phone: [phone]  Fax: [phone]   Patient: Sophia Cesar       YOB: 1975   Date of Visit: 8/1/2019     Please excuse Ms. Cesar on 8/1/19 returning to work on 8/2/19.    Sincerely,    MD Amber Brown LPN

## 2019-08-24 RX ORDER — ACYCLOVIR 400 MG/1
TABLET ORAL
Qty: 21 TABLET | Refills: 0 | Status: SHIPPED | OUTPATIENT
Start: 2019-08-24 | End: 2020-08-04

## 2019-08-27 ENCOUNTER — PATIENT MESSAGE (OUTPATIENT)
Dept: FAMILY MEDICINE | Facility: CLINIC | Age: 44
End: 2019-08-27

## 2019-08-27 DIAGNOSIS — Z00.00 ANNUAL PHYSICAL EXAM: Primary | ICD-10-CM

## 2019-08-31 LAB
25(OH)D3+25(OH)D2 SERPL-MCNC: 34.9 NG/ML (ref 30–100)
ALBUMIN SERPL-MCNC: 3.8 G/DL (ref 3.5–5.5)
ALBUMIN/GLOB SERPL: 1.2 {RATIO} (ref 1.2–2.2)
ALP SERPL-CCNC: 110 IU/L (ref 39–117)
ALT SERPL-CCNC: 12 IU/L (ref 0–32)
AST SERPL-CCNC: 12 IU/L (ref 0–40)
BASOPHILS # BLD AUTO: 0.1 X10E3/UL (ref 0–0.2)
BASOPHILS NFR BLD AUTO: 1 %
BILIRUB SERPL-MCNC: 0.5 MG/DL (ref 0–1.2)
BUN SERPL-MCNC: 7 MG/DL (ref 6–24)
BUN/CREAT SERPL: 8 (ref 9–23)
CALCIUM SERPL-MCNC: 9.1 MG/DL (ref 8.7–10.2)
CHLORIDE SERPL-SCNC: 100 MMOL/L (ref 96–106)
CHOLEST SERPL-MCNC: 187 MG/DL (ref 100–199)
CO2 SERPL-SCNC: 18 MMOL/L (ref 20–29)
CREAT SERPL-MCNC: 0.84 MG/DL (ref 0.57–1)
EOSINOPHIL # BLD AUTO: 0.4 X10E3/UL (ref 0–0.4)
EOSINOPHIL NFR BLD AUTO: 5 %
ERYTHROCYTE [DISTWIDTH] IN BLOOD BY AUTOMATED COUNT: 14 % (ref 12.3–15.4)
GLOBULIN SER CALC-MCNC: 3.1 G/DL (ref 1.5–4.5)
GLUCOSE SERPL-MCNC: 128 MG/DL (ref 65–99)
HBA1C MFR BLD: 7.1 % (ref 4.8–5.6)
HCT VFR BLD AUTO: 40.2 % (ref 34–46.6)
HDLC SERPL-MCNC: 36 MG/DL
HGB BLD-MCNC: 13 G/DL (ref 11.1–15.9)
HIV 1+2 AB+HIV1 P24 AG SERPL QL IA: NON REACTIVE
IMM GRANULOCYTES # BLD AUTO: 0 X10E3/UL (ref 0–0.1)
IMM GRANULOCYTES NFR BLD AUTO: 0 %
INSULIN SERPL-ACNC: 5.7 UIU/ML (ref 2.6–24.9)
LDLC SERPL CALC-MCNC: 136 MG/DL (ref 0–99)
LYMPHOCYTES # BLD AUTO: 2.8 X10E3/UL (ref 0.7–3.1)
LYMPHOCYTES NFR BLD AUTO: 35 %
MCH RBC QN AUTO: 29.9 PG (ref 26.6–33)
MCHC RBC AUTO-ENTMCNC: 32.3 G/DL (ref 31.5–35.7)
MCV RBC AUTO: 92 FL (ref 79–97)
MONOCYTES # BLD AUTO: 0.5 X10E3/UL (ref 0.1–0.9)
MONOCYTES NFR BLD AUTO: 7 %
NEUTROPHILS # BLD AUTO: 4.2 X10E3/UL (ref 1.4–7)
NEUTROPHILS NFR BLD AUTO: 52 %
PLATELET # BLD AUTO: 309 X10E3/UL (ref 150–450)
POTASSIUM SERPL-SCNC: 4.1 MMOL/L (ref 3.5–5.2)
PROT SERPL-MCNC: 6.9 G/DL (ref 6–8.5)
RBC # BLD AUTO: 4.35 X10E6/UL (ref 3.77–5.28)
SODIUM SERPL-SCNC: 137 MMOL/L (ref 134–144)
TRIGL SERPL-MCNC: 74 MG/DL (ref 0–149)
TSH SERPL DL<=0.005 MIU/L-ACNC: 1.52 UIU/ML (ref 0.45–4.5)
VLDLC SERPL CALC-MCNC: 15 MG/DL (ref 5–40)
WBC # BLD AUTO: 7.9 X10E3/UL (ref 3.4–10.8)

## 2019-09-01 ENCOUNTER — PATIENT MESSAGE (OUTPATIENT)
Dept: FAMILY MEDICINE | Facility: CLINIC | Age: 44
End: 2019-09-01

## 2019-09-06 ENCOUNTER — OFFICE VISIT (OUTPATIENT)
Dept: FAMILY MEDICINE | Facility: CLINIC | Age: 44
End: 2019-09-06
Payer: COMMERCIAL

## 2019-09-06 VITALS
HEIGHT: 62 IN | WEIGHT: 227.75 LBS | DIASTOLIC BLOOD PRESSURE: 76 MMHG | BODY MASS INDEX: 41.91 KG/M2 | OXYGEN SATURATION: 99 % | TEMPERATURE: 99 F | SYSTOLIC BLOOD PRESSURE: 116 MMHG | HEART RATE: 69 BPM

## 2019-09-06 DIAGNOSIS — Z79.899 ON STATIN THERAPY DUE TO RISK OF FUTURE CARDIOVASCULAR EVENT: ICD-10-CM

## 2019-09-06 DIAGNOSIS — Z12.31 VISIT FOR SCREENING MAMMOGRAM: ICD-10-CM

## 2019-09-06 DIAGNOSIS — E55.9 VITAMIN D DEFICIENCY: ICD-10-CM

## 2019-09-06 DIAGNOSIS — Z79.899 ON ANGIOTENSIN-CONVERTING ENZYME (ACE) INHIBITORS: ICD-10-CM

## 2019-09-06 DIAGNOSIS — Z00.00 ANNUAL PHYSICAL EXAM: Primary | ICD-10-CM

## 2019-09-06 DIAGNOSIS — E66.01 MORBID OBESITY WITH BMI OF 40.0-44.9, ADULT: ICD-10-CM

## 2019-09-06 DIAGNOSIS — Z23 NEED FOR PROPHYLACTIC VACCINATION AGAINST HEPATITIS B VIRUS: ICD-10-CM

## 2019-09-06 DIAGNOSIS — E11.9 TYPE 2 DIABETES MELLITUS WITHOUT COMPLICATION, WITHOUT LONG-TERM CURRENT USE OF INSULIN: ICD-10-CM

## 2019-09-06 PROCEDURE — 90739 HEPB VACC 2/4 DOSE ADULT IM: CPT | Mod: S$GLB,,, | Performed by: FAMILY MEDICINE

## 2019-09-06 PROCEDURE — 90471 IMMUNIZATION ADMIN: CPT | Mod: S$GLB,,, | Performed by: FAMILY MEDICINE

## 2019-09-06 PROCEDURE — 99999 PR PBB SHADOW E&M-EST. PATIENT-LVL III: ICD-10-PCS | Mod: PBBFAC,,, | Performed by: FAMILY MEDICINE

## 2019-09-06 PROCEDURE — 90739 HEPATITIS B (RECOMBINANT) ADJUVANTED, 2 DOSE: ICD-10-PCS | Mod: S$GLB,,, | Performed by: FAMILY MEDICINE

## 2019-09-06 PROCEDURE — 99396 PR PREVENTIVE VISIT,EST,40-64: ICD-10-PCS | Mod: 25,S$GLB,, | Performed by: FAMILY MEDICINE

## 2019-09-06 PROCEDURE — 99396 PREV VISIT EST AGE 40-64: CPT | Mod: 25,S$GLB,, | Performed by: FAMILY MEDICINE

## 2019-09-06 PROCEDURE — 3045F PR MOST RECENT HEMOGLOBIN A1C LEVEL 7.0-9.0%: ICD-10-PCS | Mod: CPTII,S$GLB,, | Performed by: FAMILY MEDICINE

## 2019-09-06 PROCEDURE — 90471 HEPATITIS B (RECOMBINANT) ADJUVANTED, 2 DOSE: ICD-10-PCS | Mod: S$GLB,,, | Performed by: FAMILY MEDICINE

## 2019-09-06 PROCEDURE — 3045F PR MOST RECENT HEMOGLOBIN A1C LEVEL 7.0-9.0%: CPT | Mod: CPTII,S$GLB,, | Performed by: FAMILY MEDICINE

## 2019-09-06 PROCEDURE — 99999 PR PBB SHADOW E&M-EST. PATIENT-LVL III: CPT | Mod: PBBFAC,,, | Performed by: FAMILY MEDICINE

## 2019-09-06 NOTE — PROGRESS NOTES
Patient tolerated injection well in right deltoid. I asked her to wait in the clinic for 15 minutes to verify no adverse reactions before leaving. Patient verbally verified understanding/vlw

## 2019-09-06 NOTE — PROGRESS NOTES
HISTORY OF PRESENT ILLNESS: Ms. Cesar comes in today not fasting with taking medication and without acute problems for annual wellness examination.     END OF LIFE DECISION: She does not have a living will and does desire life support.    Current Outpatient Medications   Medication Sig    acyclovir (ZOVIRAX) 400 MG tablet TAKE 1 TABLET(400 MG) BY MOUTH THREE TIMES DAILY    blood sugar diagnostic (CONTOUR TEST STRIPS) Strp Use twice daily with Contour [place]    blood-glucose meter (FREESTYLE LITE METER) kit Use as instructed for CONTOUR METER    ergocalciferol (ERGOCALCIFEROL) 50,000 unit Cap Take 1 capsule (50,000 Units total) by mouth every 7 days.    lancets Misc Use twice daily with Contour Meter    lisinopril (PRINIVIL,ZESTRIL) 5 MG tablet Take 1 tablet (5 mg total) by mouth once daily.    metFORMIN (GLUCOPHAGE) 500 MG tablet Take 1 tablet (500 mg total) by mouth 2 (two) times daily with meals.    pravastatin (PRAVACHOL) 10 MG tablet Take 1 tablet (10 mg total) by mouth every evening.     SCREENINGS:   Cholesterol: August 30, 2019.  FFS/Colonoscopy: Never.  Mammogram: June 4, 2018 - okay.   GYN Exam: May 9, 2017 - okay. July 23, 2013 pap smear - okay. No longer needs pap smear.  Dexa Scan: Never.  Eye Exam: January 2019 with [facility]. She wears glasses.   Dental Exam: 2018. Due.  PPD: Never.  Immunizations: Tdap - September 2, 2010.  Gardasil - N./A.  Zostavax - N./A.  Pneumovax - October 25, 2018.  Hepatitis B vaccine - December 20, 2018, October 25, 2018.  She desires HepB# 3 shot (to complete series) today.  Seasonal Flu - January 25, 2019. She declines flu shot until later this fall.     ROS:  GENERAL: Denies fever, chills, fatigue, or unusual weight gain. Appetite normal. Weight 105 kg (231 lb 7.7 oz) at August 1, 2019 visit. Does not exercise as tired. Monitors diet some times.  SKIN: Denies rashes, itching, changes in mole, color or texture of skin or easy bruising.  HEAD: Denies headaches  "or recent head trauma.  EYES: Denies change in vision, pain, diplopia, redness or discharge. Wears glasses.  EARS: Denies ear pain, discharge, vertigo or decreased hearing.  NOSE: Denies loss of smell, epistaxis or rhinitis.  MOUTH & THROAT: Denies hoarseness or change in voice. Denies excessive gum bleeding or mouth sores. Denies sore throat.  NODES: Denies swollen glands.  CHEST: Denies BIRD, wheezing, cough, or sputum production.  CARDIOVASCULAR: Denies chest pain, PND, orthopnea or reduced exercise tolerance. Denies palpitations.  ABDOMEN: Denies diarrhea, constipation, nausea, vomiting, abdominal pain, or blood in stool.  URINARY: Denies flank pain, dysuria or hematuria.  GENITOURINARY: Denies flank pain, dysuria, frequency or hematuria. Occasionally performs monthly breast self exam.   ENDOCRINE: Denies cholesterol, thyroid problems. Reports performs home glucose checks with morning (fasting) levels ranging 100-130 and bedtime (2-hour post-prandial) levels ranging 170-180's.  HEME/LYMPH: Denies bleeding problems.  PERIPHERAL VASCULAR:Denies claudication or cyanosis.  MUSCULOSKELETAL: Denies joint stiffness, pain or swelling. Denies edema.  NEUROLOGIC: Denies history of seizures, tremors, paralysis, alteration of gait or coordination.  PSYCHIATRIC: Denies mood swings, depression, anxiety, homicidal or suicidal thoughts. Denies sleep problems.     PE:   VS: /76   Pulse 69   Temp 98.7 °F (37.1 °C) (Oral)   Ht 5' 2" (1.575 m)   Wt 103.3 kg (227 lb 11.8 oz)   SpO2 99%   BMI 41.65 kg/m²   APPEARANCE: Well nourished, well developed female, obese and pleasant, alert and oriented, in no acute distress.   HEAD: Non tender. Full range of motion.  EYES: PERRL, conjunctiva pink, lids no edema. She wears glasses.  EARS: External canal patent, no swelling or redness. TM's shiny and clear.  NOSE: Mucosa and turbinates pink, not swollen. No discharge. Non tender sinuses.  THROAT: No pharyngeal erythema or exudate. " No stridor.   NECK: Supple, no mass, thyroid not enlarged.  NODES: No cervical, axillary or inguinal lymph node enlargement.  CHEST: Normal respiratory effort. Lungs clear to auscultation.  CARDIOVASCULAR: Normal S1, S2. No rubs, murmurs or gallops. PMI not displaced. No carotid bruit. Pedal pulses palpable bilaterally. No edema.  ABDOMEN: Bowel sounds present. Not distended. Soft. No tenderness, masses or organomegaly.  BREAST EXAM: Symmetrical, no external lesions, no discharge, no masses palpated.  PELVIC EXAM: No external lesions noted, no discharge, absent cervix and uterus, bimanual exam showed no adnexal masses or tenderness. Urethra and bladder intact.  RECTAL EXAM: Not examined per patient request.  MUSCULOSKELETAL: No joint deformities or stiffness. She is ambulatory without problems.  SKIN: No rashes or suspicious lesions, normal color and turgor.  NEUROLOGIC: Cranial Nerves: II-XII grossly intact. DTR's: Knees, Ankles 2+ and equal bilaterally. Gait & Posture: Normal gait and fine motion.  PSYCHIATRIC: Patient alert, oriented x 3. Mood/Affect normal. Judgment/insight good as she makes appropriate decisions during today's exam.     Protective Sensation (w/ 10 gram monofilament):  Right: Intact  Left: Intact    Visual Inspection:  Normal -  Bilateral    Pedal Pulses:   Right: Present  Left: Present    Posterior tibialis:   Right:Present  Left: Present     Lab Results   Component Value Date    HGBA1C 7.1 (H) 08/30/2019     Lab Results   Component Value Date    LDLCALC 136 (H) 08/30/2019     Lab Results   Component Value Date    TSH 1.520 08/30/2019     CMP  Sodium   Date Value Ref Range Status   08/30/2019 137 134 - 144 mmol/L Final     Potassium   Date Value Ref Range Status   08/30/2019 4.1 3.5 - 5.2 mmol/L Final     Chloride   Date Value Ref Range Status   08/30/2019 100 96 - 106 mmol/L Final     CO2   Date Value Ref Range Status   08/30/2019 18 (L) 20 - 29 mmol/L Final     Glucose   Date Value Ref Range  Status   08/30/2019 128 (H) 65 - 99 mg/dL Final     BUN, Bld   Date Value Ref Range Status   08/30/2019 7 6 - 24 mg/dL Final     Creatinine   Date Value Ref Range Status   08/30/2019 0.84 0.57 - 1.00 mg/dL Final     Calcium   Date Value Ref Range Status   08/30/2019 9.1 8.7 - 10.2 mg/dL Final     Total Protein   Date Value Ref Range Status   08/30/2019 6.9 6.0 - 8.5 g/dL Final     Albumin   Date Value Ref Range Status   08/30/2019 3.8 3.5 - 5.5 g/dL Final     Total Bilirubin   Date Value Ref Range Status   08/30/2019 0.5 0.0 - 1.2 mg/dL Final     Alkaline Phosphatase   Date Value Ref Range Status   08/30/2019 110 39 - 117 IU/L Final     AST   Date Value Ref Range Status   08/30/2019 12 0 - 40 IU/L Final     ALT   Date Value Ref Range Status   08/30/2019 12 0 - 32 IU/L Final     Anion Gap   Date Value Ref Range Status   12/17/2014 6 (L) 8 - 16 mmol/L Final     eGFR if    Date Value Ref Range Status   12/17/2014 >60.0 >60 mL/min/1.73 m^2 Final     Lab Results   Component Value Date    WBC 7.9 08/30/2019    HGB 13.0 08/30/2019    HCT 40.2 08/30/2019    MCV 92 08/30/2019     08/30/2019     Insulin 2.6 - 24.9 uIU/mL 5.7     08/30/2019       Vit D, 25-Hydroxy 30.0 - 100.0 ng/mL 34.9   08/30/19     HIV Screen 4th Generation wRfx Non Reactive Non Reactive    08/30/19     ASSESSMENT:    ICD-10-CM ICD-9-CM    1. Annual physical exam Z00.00 V70.0    2. Type 2 diabetes mellitus without complication, without long-term current use of insulin E11.9 250.00 Hemoglobin A1c      Protein / creatinine ratio, urine         3. On statin therapy due to risk of future cardiovascular event Z79.899 V58.69    4. On angiotensin-converting enzyme (ACE) inhibitors Z79.899 V07.52    5. Vitamin D deficiency E55.9 268.9    6. Morbid obesity with BMI of 40.0-44.9, adult E66.01 278.01     Z68.41 V85.41    7. Visit for screening mammogram Z12.31 V76.12 Mammo Digital Screening Bilat   8. Need for prophylactic vaccination against  hepatitis B virus Z23 V05.3 (In Office Administered) Hepatitis B (Recombinant) Adjuvanted, 2 dose     PLAN:  1. Age-appropriate counseling-appropriate low-sodium, low-cholesterol, low carbohydrate diet and exercise daily, monthly breast self exam, annual wellness examination.   2. Reviewed above results with patient.   3. Continue current medications.  4. Annual eye and dental examinations advised.  5. Flu shot this fall.  6. Keep follow up with specialists.   7. Follow up in about 3 months (around 12/6/2019) for diabetes follow up. Patient advised get labs - A1c, urine protein/creatinine ratio - done 1 week prior to next appointment with me.      Answers for HPI/ROS submitted by the patient on 9/3/2019   activity change: No  unexpected weight change: No  neck pain: No  hearing loss: No  rhinorrhea: No  trouble swallowing: No  eye discharge: No  visual disturbance: No  chest tightness: No  wheezing: No  chest pain: No  palpitations: No  blood in stool: No  constipation: No  vomiting: No  diarrhea: No  polydipsia: No  polyuria: No  difficulty urinating: No  hematuria: No  menstrual problem: No  dysuria: No  joint swelling: No  arthralgias: No  headaches: No  weakness: No  confusion: No  dysphoric mood: No

## 2019-10-03 DIAGNOSIS — Z79.899 ON STATIN THERAPY DUE TO RISK OF FUTURE CARDIOVASCULAR EVENT: ICD-10-CM

## 2019-10-03 DIAGNOSIS — Z79.899 ON ANGIOTENSIN-CONVERTING ENZYME (ACE) INHIBITORS: ICD-10-CM

## 2019-10-03 RX ORDER — PRAVASTATIN SODIUM 10 MG/1
TABLET ORAL
Qty: 30 TABLET | Refills: 5 | Status: SHIPPED | OUTPATIENT
Start: 2019-10-03 | End: 2020-11-18 | Stop reason: SDUPTHER

## 2019-10-03 RX ORDER — LISINOPRIL 5 MG/1
TABLET ORAL
Qty: 30 TABLET | Refills: 5 | Status: SHIPPED | OUTPATIENT
Start: 2019-10-03 | End: 2020-11-18 | Stop reason: SDUPTHER

## 2019-10-07 ENCOUNTER — HOSPITAL ENCOUNTER (OUTPATIENT)
Dept: RADIOLOGY | Facility: HOSPITAL | Age: 44
Discharge: HOME | End: 2019-10-07
Attending: FAMILY MEDICINE
Payer: COMMERCIAL

## 2019-10-07 VITALS — WEIGHT: 227.75 LBS | BODY MASS INDEX: 41.91 KG/M2 | HEIGHT: 62 IN

## 2019-10-07 DIAGNOSIS — Z12.31 VISIT FOR SCREENING MAMMOGRAM: ICD-10-CM

## 2019-10-07 PROCEDURE — 77063 MAMMO DIGITAL SCREENING BILAT WITH TOMOSYNTHESIS_CAD: ICD-10-PCS | Mod: 26,,, | Performed by: RADIOLOGY

## 2019-10-07 PROCEDURE — 77067 SCR MAMMO BI INCL CAD: CPT | Mod: 26,,, | Performed by: RADIOLOGY

## 2019-10-07 PROCEDURE — 77067 SCR MAMMO BI INCL CAD: CPT | Mod: TC

## 2019-10-07 PROCEDURE — 77063 BREAST TOMOSYNTHESIS BI: CPT | Mod: 26,,, | Performed by: RADIOLOGY

## 2019-10-07 PROCEDURE — 77067 MAMMO DIGITAL SCREENING BILAT WITH TOMOSYNTHESIS_CAD: ICD-10-PCS | Mod: 26,,, | Performed by: RADIOLOGY

## 2019-12-02 ENCOUNTER — PATIENT MESSAGE (OUTPATIENT)
Dept: FAMILY MEDICINE | Facility: CLINIC | Age: 44
End: 2019-12-02

## 2019-12-05 LAB — HBA1C MFR BLD: 6.5 % (ref 4.8–5.6)

## 2019-12-19 ENCOUNTER — PATIENT MESSAGE (OUTPATIENT)
Dept: PODIATRY | Facility: CLINIC | Age: 44
End: 2019-12-19

## 2019-12-19 RX ORDER — AMOXICILLIN AND CLAVULANATE POTASSIUM 875; 125 MG/1; MG/1
1 TABLET, FILM COATED ORAL EVERY 12 HOURS
Qty: 20 TABLET | Refills: 0 | Status: SHIPPED | OUTPATIENT
Start: 2019-12-19 | End: 2019-12-29

## 2020-01-08 ENCOUNTER — PATIENT MESSAGE (OUTPATIENT)
Dept: FAMILY MEDICINE | Facility: CLINIC | Age: 45
End: 2020-01-08

## 2020-05-18 LAB
LEFT EYE DM RETINOPATHY: NEGATIVE
RIGHT EYE DM RETINOPATHY: NEGATIVE

## 2020-05-20 ENCOUNTER — PATIENT MESSAGE (OUTPATIENT)
Dept: FAMILY MEDICINE | Facility: CLINIC | Age: 45
End: 2020-05-20

## 2020-05-21 ENCOUNTER — OFFICE VISIT (OUTPATIENT)
Dept: FAMILY MEDICINE | Facility: CLINIC | Age: 45
End: 2020-05-21
Payer: COMMERCIAL

## 2020-05-21 DIAGNOSIS — F41.9 ANXIETY: Primary | ICD-10-CM

## 2020-05-21 PROBLEM — E11.9 CONTROLLED TYPE 2 DIABETES MELLITUS WITHOUT COMPLICATION, WITHOUT LONG-TERM CURRENT USE OF INSULIN: Status: ACTIVE | Noted: 2020-05-21

## 2020-05-21 PROCEDURE — 99214 PR OFFICE/OUTPT VISIT, EST, LEVL IV, 30-39 MIN: ICD-10-PCS | Mod: 95,,, | Performed by: REGISTERED NURSE

## 2020-05-21 PROCEDURE — 99214 OFFICE O/P EST MOD 30 MIN: CPT | Mod: 95,,, | Performed by: REGISTERED NURSE

## 2020-05-21 NOTE — PROGRESS NOTES
"      PRIMARY CARE TELEMEDICINE NOTE      The patient location is:  Patient Home   The chief complaint leading to consultation is: ANXIETY  Total time spent with patient: 30 min    Visit type: Virtual visit with synchronous audio only and video  Each patient to whom he or she provides medical services by telemedicine is:  (1) informed of the relationship between the physician and patient and the respective role of any other health care provider with respect to management of the patient.  (2) notified that he or she may decline to receive medical services by telemedicine and may withdraw from such care at any time.      SUBJECTIVE:       Patient ID: Sophia Cesar is a 44 y.o. female.    Patient reports increased mental stress and anxiety.  She has been quarantined for the past few months, rarely gets out.  Did try to return to work this past Monday 5/18/2020.  She reports "it freaked me out", had a panic attack.  Started crying and hyperventilating, ended up staying but was agitated and snapped at others frequently.  She went home early.  Also had anxiety bringing her ill mother to the MD office, states "it was awful".  Employed with the [employer].  She reports a h/o situational depression about 2 yrs ago, no real anxiety issues.  Took her 2 months to feel better.  Not sleeping well, up/down all night.  Tries to nap during the day when she can d/t fatigue.  Her mind races with increased worrying.  No appetite changes although she reports being a stress eater.    DM ---   HTN --- not checking home BP      Review of patient's allergies indicates:  No Known Allergies      Patient Active Problem List   Diagnosis    Anxiety and depression    TMJ (dislocation of temporomandibular joint)    Morbid obesity with BMI of 40.0-44.9, adult    Vitamin D deficiency    On angiotensin-converting enzyme (ACE) inhibitors    On statin therapy due to risk of future cardiovascular event "           Current Outpatient Medications:     acyclovir (ZOVIRAX) 400 MG tablet, TAKE 1 TABLET(400 MG) BY MOUTH THREE TIMES DAILY, Disp: 21 tablet, Rfl: 0    blood sugar diagnostic (CONTOUR TEST STRIPS) Strp, Use twice daily with Contour [place], Disp: 100 each, Rfl: 5    blood-glucose meter (FREESTYLE LITE METER) kit, Use as instructed for CONTOUR METER, Disp: 1 each, Rfl: 0    ergocalciferol (ERGOCALCIFEROL) 50,000 unit Cap, Take 1 capsule (50,000 Units total) by mouth every 7 days., Disp: 4 capsule, Rfl: 2    lancets Misc, Use twice daily with Contour Meter, Disp: 100 each, Rfl: 5    lisinopril (PRINIVIL,ZESTRIL) 5 MG tablet, TAKE 1 TABLET(5 MG) BY MOUTH EVERY DAY, Disp: 30 tablet, Rfl: 5    metFORMIN (GLUCOPHAGE) 500 MG tablet, Take 1 tablet (500 mg total) by mouth 2 (two) times daily with meals., Disp: 60 tablet, Rfl: 2    pravastatin (PRAVACHOL) 10 MG tablet, TAKE 1 TABLET(10 MG) BY MOUTH EVERY EVENING, Disp: 30 tablet, Rfl: 5        Past Medical History:   Diagnosis Date    Anemia during pregnancy     Diabetes mellitus, type 2     HSV-1 infection     HSV-2 infection     Obesity     Pre-diabetes 4/4/2016    Premature surgical menopause     No history of abnormal pap smear    Vitamin D deficiency 4/4/2016         Family History   Problem Relation Age of Onset    Hypertension Mother     Cancer Maternal Aunt         Breast cancer    Hypertension Maternal Aunt     Breast cancer Maternal Aunt     Cancer Maternal Grandmother         Pancreatic cancer    Stroke Maternal Aunt     Hypertension Maternal Aunt     Diabetes Maternal Aunt     Hypertension Maternal Aunt     Hypertension Maternal Aunt     Glaucoma Sister     No Known Problems Daughter     No Known Problems Son     Diabetes Paternal Uncle     Crohn's disease Cousin     Heart disease Neg Hx          Past Surgical History:   Procedure Laterality Date    HYSTERECTOMY      PARTIAL HYSTERECTOMY  2006    Due to DUB after delivery          Social History     Socioeconomic History    Marital status: Single     Spouse name: Not on file    Number of children: 2    Years of education: Not on file    Highest education level: Not on file   Occupational History    Occupation: Accounting     Employer: [employer]      Comment: [place]   Social Needs    Financial resource strain: Somewhat hard    Food insecurity:     Worry: Sometimes true     Inability: Sometimes true    Transportation needs:     Medical: No     Non-medical: No   Tobacco Use    Smoking status: Never Smoker    Smokeless tobacco: Never Used   Substance and Sexual Activity    Alcohol use: Yes     Alcohol/week: 0.0 standard drinks     Frequency: Monthly or less     Drinks per session: 1 or 2     Binge frequency: Never     Comment: Rarely    Drug use: No    Sexual activity: Not Currently     Partners: Male     Birth control/protection: Condom, Surgical     Comment: 1 Partner   Lifestyle    Physical activity:     Days per week: 0 days     Minutes per session: 0 min    Stress: To some extent   Relationships    Social connections:     Talks on phone: More than three times a week     Gets together: Once a week     Attends Spiritism service: More than 4 times per year     Active member of club or organization: No     Attends meetings of clubs or organizations: Never     Relationship status: Never    Other Topics Concern    Not on file   Social History Narrative    She wears seatbelt.           Review of Systems   Constitutional: Positive for activity change and fatigue. Negative for appetite change and unexpected weight change.   HENT: Negative for hearing loss, rhinorrhea and trouble swallowing.    Eyes: Negative for discharge and visual disturbance.   Respiratory: Positive for shortness of breath. Negative for chest tightness and wheezing.    Cardiovascular: Positive for palpitations. Negative for chest pain.   Gastrointestinal: Positive for  constipation. Negative for blood in stool, diarrhea and vomiting.   Endocrine: Negative for polydipsia and polyuria.   Genitourinary: Negative for difficulty urinating, dysuria, hematuria and menstrual problem.   Musculoskeletal: Positive for neck pain. Negative for arthralgias and joint swelling.   Neurological: Positive for headaches. Negative for weakness, light-headedness and numbness.   Psychiatric/Behavioral: Positive for agitation, decreased concentration, dysphoric mood and sleep disturbance. Negative for confusion, hallucinations, self-injury and suicidal ideas. The patient is nervous/anxious. The patient is not hyperactive.            OBJECTIVE:    Physical Exam:  Constitutional: The patient is oriented to person, place, and time.  Appears well-developed and well-nourished.   Respiratory:  Unlabored, in no resp distress.  Neurological: Alert and oriented to person, place, and time.   Psychiatric: Normal mood and affect, behavior is normal. Judgment and thought content normal.       ASSESSMENT:        1. Anxiety            PLAN:    Anxiety  Recently returned to work with acute anxiety-panic attack.  Triggered by pandemic and worry for infection.  She has been quarantined for the past few months which can upset someone's mental health although she denies depression.  Discussed stress relief measures, coping skills, exercise, relaxation, meditation, and support system.  No medication for now.      Follow-up:  PRN

## 2020-06-25 ENCOUNTER — CLINICAL SUPPORT (OUTPATIENT)
Dept: FAMILY MEDICINE | Facility: CLINIC | Age: 45
End: 2020-06-25
Payer: COMMERCIAL

## 2020-06-25 ENCOUNTER — OFFICE VISIT (OUTPATIENT)
Dept: FAMILY MEDICINE | Facility: CLINIC | Age: 45
End: 2020-06-25
Payer: COMMERCIAL

## 2020-06-25 VITALS
DIASTOLIC BLOOD PRESSURE: 84 MMHG | SYSTOLIC BLOOD PRESSURE: 122 MMHG | BODY MASS INDEX: 37.21 KG/M2 | TEMPERATURE: 98 F | WEIGHT: 202.19 LBS | HEIGHT: 62 IN | HEART RATE: 88 BPM | OXYGEN SATURATION: 98 %

## 2020-06-25 DIAGNOSIS — H69.92 DYSFUNCTION OF LEFT EUSTACHIAN TUBE: Primary | ICD-10-CM

## 2020-06-25 DIAGNOSIS — Z20.822 EXPOSURE TO COVID-19 VIRUS: ICD-10-CM

## 2020-06-25 DIAGNOSIS — R59.1 LYMPHADENOPATHY: ICD-10-CM

## 2020-06-25 PROCEDURE — 3008F PR BODY MASS INDEX (BMI) DOCUMENTED: ICD-10-PCS | Mod: CPTII,S$GLB,, | Performed by: REGISTERED NURSE

## 2020-06-25 PROCEDURE — 3008F BODY MASS INDEX DOCD: CPT | Mod: CPTII,S$GLB,, | Performed by: REGISTERED NURSE

## 2020-06-25 PROCEDURE — 99999 PR PBB SHADOW E&M-EST. PATIENT-LVL IV: ICD-10-PCS | Mod: PBBFAC,,, | Performed by: REGISTERED NURSE

## 2020-06-25 PROCEDURE — 99213 PR OFFICE/OUTPT VISIT, EST, LEVL III, 20-29 MIN: ICD-10-PCS | Mod: S$GLB,,, | Performed by: REGISTERED NURSE

## 2020-06-25 PROCEDURE — 99999 PR PBB SHADOW E&M-EST. PATIENT-LVL II: ICD-10-PCS | Mod: PBBFAC,,,

## 2020-06-25 PROCEDURE — U0003 INFECTIOUS AGENT DETECTION BY NUCLEIC ACID (DNA OR RNA); SEVERE ACUTE RESPIRATORY SYNDROME CORONAVIRUS 2 (SARS-COV-2) (CORONAVIRUS DISEASE [COVID-19]), AMPLIFIED PROBE TECHNIQUE, MAKING USE OF HIGH THROUGHPUT TECHNOLOGIES AS DESCRIBED BY CMS-2020-01-R: HCPCS

## 2020-06-25 PROCEDURE — 99213 OFFICE O/P EST LOW 20 MIN: CPT | Mod: S$GLB,,, | Performed by: REGISTERED NURSE

## 2020-06-25 PROCEDURE — 99999 PR PBB SHADOW E&M-EST. PATIENT-LVL II: CPT | Mod: PBBFAC,,,

## 2020-06-25 PROCEDURE — 99999 PR PBB SHADOW E&M-EST. PATIENT-LVL IV: CPT | Mod: PBBFAC,,, | Performed by: REGISTERED NURSE

## 2020-06-25 RX ORDER — MOMETASONE FUROATE 50 UG/1
2 SPRAY, METERED NASAL DAILY
Qty: 17 G | Refills: 1 | Status: SHIPPED | OUTPATIENT
Start: 2020-06-25 | End: 2020-11-18

## 2020-06-25 RX ORDER — MONTELUKAST SODIUM 10 MG/1
10 TABLET ORAL DAILY
Qty: 90 TABLET | Refills: 0 | Status: SHIPPED | OUTPATIENT
Start: 2020-06-25 | End: 2020-09-22

## 2020-06-25 NOTE — PROGRESS NOTES
Subjective:       Patient ID: Sophia Cesar is a 44 y.o. female.    Chief Complaint   Patient presents with    Neck Pain    Otalgia       HPI    Reports pain to left neck and left ear over the past few days.  She thinks her lymph node is involved.  Denies fever, chills or sore throat.  Pain located behind ear and under chin.  Her cousin who is a nurse looked w/ a scope in her ear and told her the ear was not red.  Taking Tylenol for pain.  Denies tinnitus or hearing changes.  Denies sinus issues, cough or COVID exposure.      Review of Systems   Constitutional: Negative for chills and fever.   HENT: Positive for ear pain and sore throat. Negative for ear discharge, hearing loss and rhinorrhea.    Respiratory: Negative for cough.    Gastrointestinal: Negative for abdominal pain, diarrhea and vomiting.   Musculoskeletal: Positive for neck pain.   Skin: Negative for rash.   Neurological: Positive for headaches.   Hematological: Positive for adenopathy.         Review of patient's allergies indicates:  No Known Allergies      Patient Active Problem List   Diagnosis    Anxiety and depression    TMJ (dislocation of temporomandibular joint)    Morbid obesity with BMI of 40.0-44.9, adult    Vitamin D deficiency    On angiotensin-converting enzyme (ACE) inhibitors    On statin therapy due to risk of future cardiovascular event    Controlled type 2 diabetes mellitus without complication, without long-term current use of insulin         Current Outpatient Medications:     acyclovir (ZOVIRAX) 400 MG tablet, TAKE 1 TABLET(400 MG) BY MOUTH THREE TIMES DAILY, Disp: 21 tablet, Rfl: 0    blood sugar diagnostic (CONTOUR TEST STRIPS) Strp, Use twice daily with Contour [place], Disp: 100 each, Rfl: 5    ergocalciferol (ERGOCALCIFEROL) 50,000 unit Cap, Take 1 capsule (50,000 Units total) by mouth every 7 days., Disp: 4 capsule, Rfl: 2    lancets Misc, Use twice daily with Contour Meter, Disp: 100 each, Rfl: 5     "lisinopril (PRINIVIL,ZESTRIL) 5 MG tablet, TAKE 1 TABLET(5 MG) BY MOUTH EVERY DAY, Disp: 30 tablet, Rfl: 5    metFORMIN (GLUCOPHAGE) 500 MG tablet, Take 1 tablet (500 mg total) by mouth 2 (two) times daily with meals., Disp: 60 tablet, Rfl: 2    pravastatin (PRAVACHOL) 10 MG tablet, TAKE 1 TABLET(10 MG) BY MOUTH EVERY EVENING, Disp: 30 tablet, Rfl: 5    blood-glucose meter (FREESTYLE LITE METER) kit, Use as instructed for CONTOUR METER, Disp: 1 each, Rfl: 0        Past medical, surgical, family and social histories have been reviewed today.      Objective:     Vitals:    06/25/20 0748   BP: 122/84   Pulse: 88   Temp: 98.3 °F (36.8 °C)   TempSrc: Oral   SpO2: 98%   Weight: 91.7 kg (202 lb 2.6 oz)   Height: 5' 2" (1.575 m)   PainSc:   8   PainLoc: Neck         Physical Exam  Vitals signs reviewed.   Constitutional:       General: She is not in acute distress.  HENT:      Head: Normocephalic and atraumatic.      Right Ear: Tympanic membrane and ear canal normal.      Left Ear: Tenderness present.  No middle ear effusion. Tympanic membrane is retracted (dull TM with absent LR). Tympanic membrane is not injected, scarred, perforated, erythematous or bulging.      Nose: Mucosal edema present. No rhinorrhea.      Mouth/Throat:      Mouth: Mucous membranes are moist.      Pharynx: Oropharynx is clear.   Eyes:      Conjunctiva/sclera: Conjunctivae normal.      Pupils: Pupils are equal, round, and reactive to light.   Neck:      Musculoskeletal: Normal range of motion.   Cardiovascular:      Rate and Rhythm: Normal rate and regular rhythm.   Lymphadenopathy:      Cervical: Cervical adenopathy present.   Skin:     Capillary Refill: Capillary refill takes less than 2 seconds.   Neurological:      Mental Status: She is alert and oriented to person, place, and time.           Diagnosis       1. Dysfunction of left eustachian tube    2. Lymphadenopathy          Assessment/ Plan     Dysfunction of left eustachian tube  -     " mometasone (NASONEX) 50 mcg/actuation nasal spray; 2 sprays by Nasal route once daily.  Dispense: 17 g; Refill: 1  -     montelukast (SINGULAIR) 10 mg tablet; Take 1 tablet (10 mg total) by mouth once daily.  Dispense: 90 tablet; Refill: 0    Lymphadenopathy --- discussed, monitor.        Follow-up:  Return or contact office back in 2 to 3 days if worse or no better.  Otherwise, return prn.        ALYSSA Stark  [facility]

## 2020-06-25 NOTE — LETTER
June 25, 2020      [facility]  [address] [place] [address]  Phone: [phone]  Fax: [phone]       Patient: Sophia Cesar   YOB: 1975  Date of Visit: 06/25/2020    To Whom It May Concern:    Bright Cesar  was at [facility] on 06/25/2020. She may return to work/school on 06/25/2020 with no restrictions. If you have any questions or concerns, or if I can be of further assistance, please do not hesitate to contact me.    Sincerely,    RAZ Stark LPN

## 2020-06-29 ENCOUNTER — TELEPHONE (OUTPATIENT)
Dept: FAMILY MEDICINE | Facility: CLINIC | Age: 45
End: 2020-06-29

## 2020-06-29 LAB — SARS-COV-2 RNA RESP QL NAA+PROBE: NOT DETECTED

## 2020-06-29 NOTE — TELEPHONE ENCOUNTER
----- Message from Bceki Reilly sent at 6/29/2020  7:10 AM CDT -----  Regarding: results  Contact: pt  The pt request a call concerning her COVID results, no additional info given and can be reached at [phone]///[id number]

## 2020-08-04 RX ORDER — ACYCLOVIR 400 MG/1
TABLET ORAL
Qty: 21 TABLET | Refills: 0 | OUTPATIENT
Start: 2020-08-04

## 2020-08-04 NOTE — TELEPHONE ENCOUNTER
Received refill request for pt's zovirax tablets via [employer]. Please review and advise.     Seen Cassandra twice since last visit with  on 09/06/2019

## 2020-11-04 ENCOUNTER — PATIENT MESSAGE (OUTPATIENT)
Dept: FAMILY MEDICINE | Facility: CLINIC | Age: 45
End: 2020-11-04

## 2020-11-04 DIAGNOSIS — Z00.00 ANNUAL PHYSICAL EXAM: Primary | ICD-10-CM

## 2020-11-10 ENCOUNTER — PATIENT MESSAGE (OUTPATIENT)
Dept: FAMILY MEDICINE | Facility: CLINIC | Age: 45
End: 2020-11-10

## 2020-11-10 DIAGNOSIS — Z00.00 ANNUAL PHYSICAL EXAM: Primary | ICD-10-CM

## 2020-11-10 NOTE — TELEPHONE ENCOUNTER
Orders (including urine) are in. Also ordered vit D but not sure if still covered by insurance; so, she probably should check on that. Thanks.

## 2020-11-14 LAB
25(OH)D3+25(OH)D2 SERPL-MCNC: 28.9 NG/ML (ref 30–100)
ALBUMIN SERPL-MCNC: 3.8 G/DL (ref 3.8–4.8)
ALBUMIN/GLOB SERPL: 1.4 {RATIO} (ref 1.2–2.2)
ALP SERPL-CCNC: 114 IU/L (ref 39–117)
ALT SERPL-CCNC: 16 IU/L (ref 0–32)
AST SERPL-CCNC: 15 IU/L (ref 0–40)
BASOPHILS # BLD AUTO: 0 X10E3/UL (ref 0–0.2)
BASOPHILS NFR BLD AUTO: 0 %
BILIRUB SERPL-MCNC: 0.5 MG/DL (ref 0–1.2)
BUN SERPL-MCNC: 5 MG/DL (ref 6–24)
BUN/CREAT SERPL: 7 (ref 9–23)
CALCIUM SERPL-MCNC: 8.8 MG/DL (ref 8.7–10.2)
CHLORIDE SERPL-SCNC: 103 MMOL/L (ref 96–106)
CHOLEST SERPL-MCNC: 175 MG/DL (ref 100–199)
CO2 SERPL-SCNC: 24 MMOL/L (ref 20–29)
CREAT SERPL-MCNC: 0.76 MG/DL (ref 0.57–1)
CREAT UR-MCNC: 215 MG/DL
EOSINOPHIL # BLD AUTO: 0.3 X10E3/UL (ref 0–0.4)
EOSINOPHIL NFR BLD AUTO: 4 %
ERYTHROCYTE [DISTWIDTH] IN BLOOD BY AUTOMATED COUNT: 13 % (ref 11.7–15.4)
GLOBULIN SER CALC-MCNC: 2.8 G/DL (ref 1.5–4.5)
GLUCOSE SERPL-MCNC: 125 MG/DL (ref 65–99)
HBA1C MFR BLD: 5.9 % (ref 4.8–5.6)
HCT VFR BLD AUTO: 39.8 % (ref 34–46.6)
HDLC SERPL-MCNC: 38 MG/DL
HGB BLD-MCNC: 13 G/DL (ref 11.1–15.9)
HIV 1+2 AB+HIV1 P24 AG SERPL QL IA: NON REACTIVE
IMM GRANULOCYTES # BLD AUTO: 0 X10E3/UL (ref 0–0.1)
IMM GRANULOCYTES NFR BLD AUTO: 0 %
INSULIN SERPL-ACNC: 4.5 UIU/ML (ref 2.6–24.9)
LDLC SERPL CALC-MCNC: 122 MG/DL (ref 0–99)
LYMPHOCYTES # BLD AUTO: 2.8 X10E3/UL (ref 0.7–3.1)
LYMPHOCYTES NFR BLD AUTO: 34 %
MCH RBC QN AUTO: 30.5 PG (ref 26.6–33)
MCHC RBC AUTO-ENTMCNC: 32.7 G/DL (ref 31.5–35.7)
MCV RBC AUTO: 93 FL (ref 79–97)
MONOCYTES # BLD AUTO: 0.6 X10E3/UL (ref 0.1–0.9)
MONOCYTES NFR BLD AUTO: 7 %
NEUTROPHILS # BLD AUTO: 4.6 X10E3/UL (ref 1.4–7)
NEUTROPHILS NFR BLD AUTO: 55 %
PLATELET # BLD AUTO: 353 X10E3/UL (ref 150–450)
POTASSIUM SERPL-SCNC: 4.2 MMOL/L (ref 3.5–5.2)
PROT SERPL-MCNC: 6.6 G/DL (ref 6–8.5)
PROT UR-MCNC: 25.9 MG/DL
PROT/CREAT UR: 120 MG/G CREAT (ref 0–200)
RBC # BLD AUTO: 4.26 X10E6/UL (ref 3.77–5.28)
SODIUM SERPL-SCNC: 138 MMOL/L (ref 134–144)
TRIGL SERPL-MCNC: 79 MG/DL (ref 0–149)
TSH SERPL DL<=0.005 MIU/L-ACNC: 1.75 UIU/ML (ref 0.45–4.5)
VLDLC SERPL CALC-MCNC: 15 MG/DL (ref 5–40)
WBC # BLD AUTO: 8.3 X10E3/UL (ref 3.4–10.8)

## 2020-11-18 ENCOUNTER — OFFICE VISIT (OUTPATIENT)
Dept: FAMILY MEDICINE | Facility: CLINIC | Age: 45
End: 2020-11-18
Payer: COMMERCIAL

## 2020-11-18 VITALS
RESPIRATION RATE: 16 BRPM | OXYGEN SATURATION: 98 % | SYSTOLIC BLOOD PRESSURE: 122 MMHG | BODY MASS INDEX: 35.87 KG/M2 | HEART RATE: 83 BPM | DIASTOLIC BLOOD PRESSURE: 78 MMHG | WEIGHT: 196.13 LBS | TEMPERATURE: 97 F

## 2020-11-18 DIAGNOSIS — Z00.00 ANNUAL PHYSICAL EXAM: Primary | ICD-10-CM

## 2020-11-18 DIAGNOSIS — Z23 NEED FOR INFLUENZA VACCINATION: ICD-10-CM

## 2020-11-18 DIAGNOSIS — Z79.899 ON ANGIOTENSIN-CONVERTING ENZYME (ACE) INHIBITORS: ICD-10-CM

## 2020-11-18 DIAGNOSIS — E66.9 OBESITY (BMI 30-39.9): ICD-10-CM

## 2020-11-18 DIAGNOSIS — E55.9 VITAMIN D DEFICIENCY: ICD-10-CM

## 2020-11-18 DIAGNOSIS — Z79.899 ON STATIN THERAPY DUE TO RISK OF FUTURE CARDIOVASCULAR EVENT: ICD-10-CM

## 2020-11-18 DIAGNOSIS — Z12.31 OTHER SCREENING MAMMOGRAM: ICD-10-CM

## 2020-11-18 DIAGNOSIS — E11.9 CONTROLLED TYPE 2 DIABETES MELLITUS WITHOUT COMPLICATION, WITHOUT LONG-TERM CURRENT USE OF INSULIN: ICD-10-CM

## 2020-11-18 PROCEDURE — 1126F AMNT PAIN NOTED NONE PRSNT: CPT | Mod: S$GLB,,, | Performed by: FAMILY MEDICINE

## 2020-11-18 PROCEDURE — 99999 PR PBB SHADOW E&M-EST. PATIENT-LVL IV: ICD-10-PCS | Mod: PBBFAC,,, | Performed by: FAMILY MEDICINE

## 2020-11-18 PROCEDURE — 3044F PR MOST RECENT HEMOGLOBIN A1C LEVEL <7.0%: ICD-10-PCS | Mod: CPTII,S$GLB,, | Performed by: FAMILY MEDICINE

## 2020-11-18 PROCEDURE — 99999 PR PBB SHADOW E&M-EST. PATIENT-LVL IV: CPT | Mod: PBBFAC,,, | Performed by: FAMILY MEDICINE

## 2020-11-18 PROCEDURE — 90686 IIV4 VACC NO PRSV 0.5 ML IM: CPT | Mod: S$GLB,,, | Performed by: FAMILY MEDICINE

## 2020-11-18 PROCEDURE — 1126F PR PAIN SEVERITY QUANTIFIED, NO PAIN PRESENT: ICD-10-PCS | Mod: S$GLB,,, | Performed by: FAMILY MEDICINE

## 2020-11-18 PROCEDURE — 3044F HG A1C LEVEL LT 7.0%: CPT | Mod: CPTII,S$GLB,, | Performed by: FAMILY MEDICINE

## 2020-11-18 PROCEDURE — 3008F BODY MASS INDEX DOCD: CPT | Mod: CPTII,S$GLB,, | Performed by: FAMILY MEDICINE

## 2020-11-18 PROCEDURE — 3008F PR BODY MASS INDEX (BMI) DOCUMENTED: ICD-10-PCS | Mod: CPTII,S$GLB,, | Performed by: FAMILY MEDICINE

## 2020-11-18 PROCEDURE — 90471 FLU VACCINE (QUAD) GREATER THAN OR EQUAL TO 3YO PRESERVATIVE FREE IM: ICD-10-PCS | Mod: S$GLB,,, | Performed by: FAMILY MEDICINE

## 2020-11-18 PROCEDURE — 90686 FLU VACCINE (QUAD) GREATER THAN OR EQUAL TO 3YO PRESERVATIVE FREE IM: ICD-10-PCS | Mod: S$GLB,,, | Performed by: FAMILY MEDICINE

## 2020-11-18 PROCEDURE — 99396 PR PREVENTIVE VISIT,EST,40-64: ICD-10-PCS | Mod: 25,S$GLB,, | Performed by: FAMILY MEDICINE

## 2020-11-18 PROCEDURE — 90471 IMMUNIZATION ADMIN: CPT | Mod: S$GLB,,, | Performed by: FAMILY MEDICINE

## 2020-11-18 PROCEDURE — 99396 PREV VISIT EST AGE 40-64: CPT | Mod: 25,S$GLB,, | Performed by: FAMILY MEDICINE

## 2020-11-18 RX ORDER — LISINOPRIL 5 MG/1
5 TABLET ORAL DAILY
Qty: 30 TABLET | Refills: 5 | Status: SHIPPED | OUTPATIENT
Start: 2020-11-18 | End: 2022-05-26 | Stop reason: SDUPTHER

## 2020-11-18 RX ORDER — PRAVASTATIN SODIUM 10 MG/1
10 TABLET ORAL NIGHTLY
Qty: 30 TABLET | Refills: 5 | Status: SHIPPED | OUTPATIENT
Start: 2020-11-18 | End: 2022-05-26 | Stop reason: SDUPTHER

## 2020-11-18 NOTE — PROGRESS NOTES
HISTORY OF PRESENT ILLNESS: Ms. Cesar comes in today not fasting with taking medication and without acute problems for annual wellness examination.    She states she has not been taking any medications (Pravastatin, Lisinopril, Metformin, or vitamin D) x 6 months.     END OF LIFE DECISION: She does not have a living will and does desire life support.    Current Outpatient Medications   Medication Sig    acyclovir (ZOVIRAX) 400 MG tablet TAKE 1 TABLET(400 MG) BY MOUTH THREE TIMES DAILY    blood sugar diagnostic (CONTOUR TEST STRIPS) Strp Use twice daily with Contour [place]    lancets Misc Use twice daily with Contour Meter    blood-glucose meter (FREESTYLE LITE METER) kit Use as instructed for CONTOUR METER    ergocalciferol (ERGOCALCIFEROL) 50,000 unit Cap Take 1 capsule (50,000 Units total) by mouth every 7 days. (Patient not taking: Reported on 11/18/2020)    lisinopril (PRINIVIL,ZESTRIL) 5 MG tablet TAKE 1 TABLET(5 MG) BY MOUTH EVERY DAY (Patient not taking: Reported on 11/18/2020)    metFORMIN (GLUCOPHAGE) 500 MG tablet Take 1 tablet (500 mg total) by mouth 2 (two) times daily with meals. (Patient not taking: Reported on 11/18/2020)    pravastatin (PRAVACHOL) 10 MG tablet TAKE 1 TABLET(10 MG) BY MOUTH EVERY EVENING (Patient not taking: Reported on 11/18/2020)     SCREENINGS:   Cholesterol: November 13, 2020.  FFS/Colonoscopy: Never.  Mammogram: October 7, 2019 - okay.    GYN Exam: September 6, 2019 - okay. July 23, 2013 pap smear - okay. No longer needs pap smear.  Dexa Scan: Never.  Eye Exam: July 2020 with [facility] with Dr. Cueto on [address]. She wears glasses.   Dental Exam: 2020.   PPD: Never.  Immunizations: Tdap - September 2, 2010, July 4, 2018.  Gardasil - N./A.  Zostavax - N./A.  Pneumovax - October 25, 2018.  Hepatitis B vaccine - December 20, 2018, October 25, 2018, September 6, 2019.  Seasonal Flu - January 25, 2019. She desires.     ROS:  GENERAL: Denies fever, chills, fatigue,  or unusual weight gain. Appetite normal. Weight 103.3 kg (227 lb 11.8 oz) at September 6, 2019 visit. Does not exercise and does not monitor diet.  SKIN: Denies rashes, itching, changes in mole, color or texture of skin or easy bruising.  HEAD: Denies headaches or recent head trauma.  EYES: Denies change in vision, pain, diplopia, redness or discharge. Wears glasses.  EARS: Denies ear pain, discharge, vertigo or decreased hearing.  NOSE: Denies loss of smell, epistaxis or rhinitis.  MOUTH & THROAT: Denies hoarseness or change in voice. Denies excessive gum bleeding or mouth sores. Denies sore throat.  NODES: Denies swollen glands.  CHEST: Denies BIRD, wheezing, cough, or sputum production.  CARDIOVASCULAR: Denies chest pain, PND, orthopnea or reduced exercise tolerance. Denies palpitations.  ABDOMEN: Denies diarrhea, constipation, nausea, vomiting, abdominal pain, or blood in stool.  URINARY: Denies flank pain, dysuria or hematuria.  GENITOURINARY: Denies flank pain, dysuria, frequency or hematuria. Not performs monthly breast self exam.   ENDOCRINE: Denies cholesterol, thyroid problems. Reports not performs home glucose checks during 2020.  HEME/LYMPH: Denies bleeding problems.  PERIPHERAL VASCULAR:Denies claudication or cyanosis.  MUSCULOSKELETAL: Denies joint stiffness, pain or swelling. Denies edema.  NEUROLOGIC: Denies history of seizures, tremors, paralysis, alteration of gait or coordination.  PSYCHIATRIC: Denies mood swings, depression, anxiety, homicidal or suicidal thoughts. Denies sleep problems.     PE:   VS: /78   Pulse 83   Temp 97 °F (36.1 °C) (Temporal)   Resp 16   Wt 89 kg (196 lb 1.6 oz)   SpO2 98%   BMI 35.87 kg/m²   APPEARANCE: Well nourished, well developed female, obese and pleasant, alert and oriented, in no acute distress.   HEAD: Non tender. Full range of motion.  EYES: PERRL, conjunctiva pink, lids no edema. She wears glasses.  EARS: External canal patent, no swelling or redness.  TM's shiny and clear.  NOSE: Mucosa and turbinates pink, not swollen. No discharge. Non tender sinuses.  THROAT: No pharyngeal erythema or exudate. No stridor.   NECK: Supple, no mass, thyroid not enlarged.  NODES: No cervical, axillary or inguinal lymph node enlargement.  CHEST: Normal respiratory effort. Lungs clear to auscultation.  CARDIOVASCULAR: Normal S1, S2. No rubs, murmurs or gallops. PMI not displaced. No carotid bruit. Pedal pulses palpable bilaterally. No edema.  ABDOMEN: Bowel sounds present. Not distended. Soft. No tenderness, masses or organomegaly.  BREAST EXAM: Symmetrical, no external lesions, no discharge, no masses palpated.  PELVIC EXAM: No external lesions noted, no discharge, absent cervix and uterus, bimanual exam showed no adnexal masses or tenderness. Urethra and bladder intact.  RECTAL EXAM: Not examined per patient request.  MUSCULOSKELETAL: No joint deformities or stiffness. She is ambulatory without problems.  SKIN: No rashes or suspicious lesions, normal color and turgor.  NEUROLOGIC: Cranial Nerves: II-XII grossly intact. DTR's: Knees, Ankles 2+ and equal bilaterally. Gait & Posture: Normal gait and fine motion.  PSYCHIATRIC: Patient alert, oriented x 3. Mood/Affect normal. Judgment/insight good as she makes appropriate decisions during today's exam.    Protective Sensation (w/ 10 gram monofilament):  Right: Intact  Left: Intact    Visual Inspection:  Normal -  Bilateral    Pedal Pulses:   Right: Present  Left: Present    Posterior tibialis:   Right:Present  Left: Present     Lab Results   Component Value Date    TSH 1.750 11/13/2020     Lab Results   Component Value Date    LDLCALC 122 (H) 11/13/2020     CMP  Sodium   Date Value Ref Range Status   11/13/2020 138 134 - 144 mmol/L Final     Potassium   Date Value Ref Range Status   11/13/2020 4.2 3.5 - 5.2 mmol/L Final     Chloride   Date Value Ref Range Status   11/13/2020 103 96 - 106 mmol/L Final     CO2   Date Value Ref Range  Status   11/13/2020 24 20 - 29 mmol/L Final     Glucose   Date Value Ref Range Status   11/13/2020 125 (H) 65 - 99 mg/dL Final     BUN   Date Value Ref Range Status   11/13/2020 5 (L) 6 - 24 mg/dL Final     Creatinine   Date Value Ref Range Status   11/13/2020 0.76 0.57 - 1.00 mg/dL Final     Calcium   Date Value Ref Range Status   11/13/2020 8.8 8.7 - 10.2 mg/dL Final     Total Protein   Date Value Ref Range Status   08/30/2019 6.9 6.0 - 8.5 g/dL Final     Albumin   Date Value Ref Range Status   11/13/2020 3.8 3.8 - 4.8 g/dL Final   08/30/2019 3.8 3.5 - 5.5 g/dL Final     Total Bilirubin   Date Value Ref Range Status   11/13/2020 0.5 0.0 - 1.2 mg/dL Final     Alkaline Phosphatase   Date Value Ref Range Status   08/30/2019 110 39 - 117 IU/L Final     AST   Date Value Ref Range Status   11/13/2020 15 0 - 40 IU/L Final     ALT   Date Value Ref Range Status   11/13/2020 16 0 - 32 IU/L Final     Anion Gap   Date Value Ref Range Status   12/17/2014 6 (L) 8 - 16 mmol/L Final     eGFR if    Date Value Ref Range Status   12/17/2014 >60.0 >60 mL/min/1.73 m^2 Final     Lab Results   Component Value Date    WBC 8.3 11/13/2020    HGB 13.0 11/13/2020    HCT 39.8 11/13/2020    MCV 93 11/13/2020     11/13/2020     Insulin 2.6 - 24.9 uIU/mL 4.5  11/13/2020     Vit D, 25-Hydroxy 30.0 - 100.0 ng/mL 28.9 11/13/2020     HIV Screen 4th Generation wRfx Non Reactive  11/13/2020     Protein/Creat Ratio 0 - 200 mg/g creat 120  11/13/2020     ASSESSMENT:    ICD-10-CM ICD-9-CM    1. Annual physical exam  Z00.00 V70.0    2. Controlled type 2 diabetes mellitus without complication, without long-term current use of insulin  E11.9 250.00    3. Vitamin D deficiency  E55.9 268.9    4. On angiotensin-converting enzyme (ACE) inhibitors  Z79.899 V07.52 lisinopriL (PRINIVIL,ZESTRIL) 5 MG tablet   5. On statin therapy due to risk of future cardiovascular event  Z79.899 V58.69 pravastatin (PRAVACHOL) 10 MG tablet   6. Obesity (BMI  30-39.9)  E66.9 278.00    7. Other screening mammogram  Z12.31 V76.12 Mammo Digital Screening Bilat   8. Need for influenza vaccination  Z23 V04.81 Influenza - Quadrivalent *Preferred* (6 months+) (PF)       PLAN:  1. Age-appropriate counseling-appropriate low-sodium, low-cholesterol, low carbohydrate diet and exercise daily, monthly breast self exam, annual wellness examination.   2. Patient advised to call for results.  3. Continue current medications (Pravastatin and Lisinopril for prevention and OTC vitamin D 2000 units daily).  4. Prescription refills as noted above.  5. Patient stated she desires to remain off of Metformin until her follow up visit with me with review of repeat labs at that time.  6. Annual eye and dental examinations advised.  7. Keep follow up with specialists.  8. Follow up in about 6 months (around 5/18/2021) for diabetes follow up via virtual visit. Patient advised to call for labs orders prior to visit with me.       Answers for HPI/ROS submitted by the patient on 11/15/2020   activity change: No  unexpected weight change: No  neck pain: No  hearing loss: No  rhinorrhea: No  trouble swallowing: No  eye discharge: No  visual disturbance: No  chest tightness: No  wheezing: No  chest pain: No  palpitations: No  blood in stool: No  constipation: No  vomiting: No  diarrhea: No  polydipsia: No  polyuria: No  difficulty urinating: No  hematuria: No  menstrual problem: No  dysuria: No  joint swelling: No  arthralgias: No  headaches: No  weakness: No  confusion: No  dysphoric mood: No

## 2020-12-01 ENCOUNTER — PATIENT OUTREACH (OUTPATIENT)
Dept: ADMINISTRATIVE | Facility: HOSPITAL | Age: 45
End: 2020-12-01

## 2020-12-07 NOTE — PROGRESS NOTES
Per [facility] Routine eye exam was performed, Recall for diabetic eye exam was cancelled per pt request. Eye exam uploaded to media.

## 2020-12-14 ENCOUNTER — HOSPITAL ENCOUNTER (OUTPATIENT)
Dept: RADIOLOGY | Facility: HOSPITAL | Age: 45
Discharge: HOME | End: 2020-12-14
Attending: FAMILY MEDICINE
Payer: COMMERCIAL

## 2020-12-14 DIAGNOSIS — Z12.31 OTHER SCREENING MAMMOGRAM: ICD-10-CM

## 2020-12-14 PROCEDURE — 77067 SCR MAMMO BI INCL CAD: CPT | Mod: TC

## 2020-12-14 PROCEDURE — 77063 BREAST TOMOSYNTHESIS BI: CPT | Mod: 26,,, | Performed by: RADIOLOGY

## 2020-12-14 PROCEDURE — 77067 SCR MAMMO BI INCL CAD: CPT | Mod: 26,,, | Performed by: RADIOLOGY

## 2020-12-14 PROCEDURE — 77067 MAMMO DIGITAL SCREENING BILAT WITH TOMO: ICD-10-PCS | Mod: 26,,, | Performed by: RADIOLOGY

## 2020-12-14 PROCEDURE — 77063 MAMMO DIGITAL SCREENING BILAT WITH TOMO: ICD-10-PCS | Mod: 26,,, | Performed by: RADIOLOGY

## 2021-02-04 ENCOUNTER — PATIENT MESSAGE (OUTPATIENT)
Dept: ADMINISTRATIVE | Facility: CLINIC | Age: 46
End: 2021-02-04

## 2021-03-20 ENCOUNTER — IMMUNIZATION (OUTPATIENT)
Dept: INTERNAL MEDICINE | Facility: CLINIC | Age: 46
End: 2021-03-20
Payer: COMMERCIAL

## 2021-03-20 DIAGNOSIS — Z23 NEED FOR VACCINATION: Primary | ICD-10-CM

## 2021-03-20 PROCEDURE — 91300 COVID-19, MRNA, LNP-S, PF, 30 MCG/0.3 ML DOSE VACCINE: CPT | Mod: PBBFAC | Performed by: FAMILY MEDICINE

## 2021-04-10 ENCOUNTER — IMMUNIZATION (OUTPATIENT)
Dept: INTERNAL MEDICINE | Facility: CLINIC | Age: 46
End: 2021-04-10
Payer: COMMERCIAL

## 2021-04-10 DIAGNOSIS — Z23 NEED FOR VACCINATION: Primary | ICD-10-CM

## 2021-04-10 PROCEDURE — 91300 COVID-19, MRNA, LNP-S, PF, 30 MCG/0.3 ML DOSE VACCINE: CPT | Mod: S$GLB,,, | Performed by: FAMILY MEDICINE

## 2021-04-10 PROCEDURE — 0002A COVID-19, MRNA, LNP-S, PF, 30 MCG/0.3 ML DOSE VACCINE: ICD-10-PCS | Mod: CV19,S$GLB,, | Performed by: FAMILY MEDICINE

## 2021-04-10 PROCEDURE — 91300 COVID-19, MRNA, LNP-S, PF, 30 MCG/0.3 ML DOSE VACCINE: ICD-10-PCS | Mod: S$GLB,,, | Performed by: FAMILY MEDICINE

## 2021-04-10 PROCEDURE — 0002A COVID-19, MRNA, LNP-S, PF, 30 MCG/0.3 ML DOSE VACCINE: CPT | Mod: CV19,S$GLB,, | Performed by: FAMILY MEDICINE

## 2021-05-06 ENCOUNTER — PATIENT MESSAGE (OUTPATIENT)
Dept: FAMILY MEDICINE | Facility: CLINIC | Age: 46
End: 2021-05-06

## 2021-05-11 DIAGNOSIS — E11.9 CONTROLLED TYPE 2 DIABETES MELLITUS WITHOUT COMPLICATION, WITHOUT LONG-TERM CURRENT USE OF INSULIN: Primary | ICD-10-CM

## 2021-05-17 ENCOUNTER — PATIENT MESSAGE (OUTPATIENT)
Dept: FAMILY MEDICINE | Facility: CLINIC | Age: 46
End: 2021-05-17

## 2021-05-18 LAB
25(OH)D3+25(OH)D2 SERPL-MCNC: 40.7 NG/ML (ref 30–100)
ALBUMIN SERPL-MCNC: 4 G/DL (ref 3.8–4.8)
ALBUMIN/GLOB SERPL: 1.3 {RATIO} (ref 1.2–2.2)
ALP SERPL-CCNC: 102 IU/L (ref 48–121)
ALT SERPL-CCNC: 6 IU/L (ref 0–32)
AST SERPL-CCNC: 10 IU/L (ref 0–40)
BILIRUB SERPL-MCNC: 0.5 MG/DL (ref 0–1.2)
BUN SERPL-MCNC: 9 MG/DL (ref 6–24)
BUN/CREAT SERPL: 12 (ref 9–23)
CALCIUM SERPL-MCNC: 9 MG/DL (ref 8.7–10.2)
CHLORIDE SERPL-SCNC: 103 MMOL/L (ref 96–106)
CHOLEST SERPL-MCNC: 165 MG/DL (ref 100–199)
CO2 SERPL-SCNC: 24 MMOL/L (ref 20–29)
CREAT SERPL-MCNC: 0.77 MG/DL (ref 0.57–1)
CREAT UR-MCNC: 285 MG/DL
GLOBULIN SER CALC-MCNC: 3 G/DL (ref 1.5–4.5)
GLUCOSE SERPL-MCNC: 124 MG/DL (ref 65–99)
HBA1C MFR BLD: 6.1 % (ref 4.8–5.6)
HDLC SERPL-MCNC: 38 MG/DL
HIV 1+2 AB+HIV1 P24 AG SERPL QL IA: NON REACTIVE
INSULIN SERPL-ACNC: 5.5 UIU/ML (ref 2.6–24.9)
LDLC SERPL CALC-MCNC: 113 MG/DL (ref 0–99)
POTASSIUM SERPL-SCNC: 3.9 MMOL/L (ref 3.5–5.2)
PROT SERPL-MCNC: 7 G/DL (ref 6–8.5)
PROT UR-MCNC: 16.5 MG/DL
PROT/CREAT UR: 58 MG/G CREAT (ref 0–200)
SODIUM SERPL-SCNC: 140 MMOL/L (ref 134–144)
TRIGL SERPL-MCNC: 73 MG/DL (ref 0–149)
TSH SERPL DL<=0.005 MIU/L-ACNC: 2.76 UIU/ML (ref 0.45–4.5)
VLDLC SERPL CALC-MCNC: 14 MG/DL (ref 5–40)

## 2021-10-25 LAB
LEFT EYE DM RETINOPATHY: NEGATIVE
RIGHT EYE DM RETINOPATHY: NEGATIVE

## 2021-12-22 DIAGNOSIS — Z12.31 OTHER SCREENING MAMMOGRAM: ICD-10-CM

## 2022-01-27 ENCOUNTER — HOSPITAL ENCOUNTER (OUTPATIENT)
Dept: RADIOLOGY | Facility: HOSPITAL | Age: 47
Discharge: HOME | End: 2022-01-27
Attending: FAMILY MEDICINE
Payer: COMMERCIAL

## 2022-01-27 ENCOUNTER — IMMUNIZATION (OUTPATIENT)
Dept: PHARMACY | Facility: CLINIC | Age: 47
End: 2022-01-27
Payer: COMMERCIAL

## 2022-01-27 VITALS — WEIGHT: 196 LBS | BODY MASS INDEX: 36.07 KG/M2 | HEIGHT: 62 IN

## 2022-01-27 DIAGNOSIS — Z12.31 OTHER SCREENING MAMMOGRAM: ICD-10-CM

## 2022-01-27 DIAGNOSIS — Z23 NEED FOR VACCINATION: Primary | ICD-10-CM

## 2022-01-27 PROCEDURE — 77063 BREAST TOMOSYNTHESIS BI: CPT | Mod: TC

## 2022-01-27 PROCEDURE — 77067 SCR MAMMO BI INCL CAD: CPT | Mod: TC

## 2022-01-27 PROCEDURE — 77067 SCR MAMMO BI INCL CAD: CPT | Mod: 26,,, | Performed by: RADIOLOGY

## 2022-01-27 PROCEDURE — 77067 MAMMO DIGITAL SCREENING BILAT WITH TOMO: ICD-10-PCS | Mod: 26,,, | Performed by: RADIOLOGY

## 2022-01-27 PROCEDURE — 77063 MAMMO DIGITAL SCREENING BILAT WITH TOMO: ICD-10-PCS | Mod: 26,,, | Performed by: RADIOLOGY

## 2022-01-27 PROCEDURE — 77063 BREAST TOMOSYNTHESIS BI: CPT | Mod: 26,,, | Performed by: RADIOLOGY

## 2022-04-28 RX ORDER — ACYCLOVIR 400 MG/1
TABLET ORAL
Qty: 21 TABLET | Refills: 0 | Status: SHIPPED | OUTPATIENT
Start: 2022-04-28 | End: 2023-02-28 | Stop reason: SDUPTHER

## 2022-04-28 RX ORDER — ACYCLOVIR 400 MG/1
TABLET ORAL 2 TIMES DAILY
OUTPATIENT
Start: 2022-04-28 | End: 2023-04-28

## 2022-04-28 NOTE — TELEPHONE ENCOUNTER
LV:11/18/2020    LAV:11/18/2020    Upcoming Appt:05/26/2022    RxRequested :acyclovir (ZOVIRAX) 400 MG tablet      Last Refill: 03/09/2021

## 2022-04-28 NOTE — TELEPHONE ENCOUNTER
Care Due:                  Date            Visit Type   Department     Provider  --------------------------------------------------------------------------------                                MYCHART                              ANNUAL                              CHECKUP/PHY  [facility]  Last Visit: 11-      [facility]       Barbi Elkins                              [facility]                              ANNUAL                              CHECKUP/PHY  [facility]  Next Visit: 05-      [facility]       Barbi Elkins                                                            Last  Test          Frequency    Reason                     Performed    Due Date  --------------------------------------------------------------------------------    CMP.........  12 months..  lisinopriL, pravastatin..  05- 05-    Lipid Panel.  12 months..  pravastatin..............  05- 05-    Powered by [employer]. Reference number: [id number].   4/28/2022 7:46:13 AM CDT

## 2022-05-12 ENCOUNTER — TELEPHONE (OUTPATIENT)
Dept: FAMILY MEDICINE | Facility: CLINIC | Age: 47
End: 2022-05-12
Payer: COMMERCIAL

## 2022-05-12 ENCOUNTER — PATIENT MESSAGE (OUTPATIENT)
Dept: FAMILY MEDICINE | Facility: CLINIC | Age: 47
End: 2022-05-12
Payer: COMMERCIAL

## 2022-05-12 DIAGNOSIS — Z00.00 ANNUAL PHYSICAL EXAM: Primary | ICD-10-CM

## 2022-05-12 NOTE — TELEPHONE ENCOUNTER
I have put the following orders and/or medications to this note.  Please advise pt and assist.    Orders Placed This Encounter   Procedures    Hemoglobin A1C     Standing Status:   Future     Standing Expiration Date:   7/11/2023    TSH     Standing Status:   Future     Standing Expiration Date:   7/11/2023    Microalbumin/Creatinine Ratio, Urine     Standing Status:   Future     Standing Expiration Date:   7/11/2023     Order Specific Question:   Specimen Source     Answer:   Urine    CBC Auto Differential     Standing Status:   Future     Standing Expiration Date:   7/11/2023    Comprehensive Metabolic Panel     Standing Status:   Future     Standing Expiration Date:   7/11/2023    Lipid Panel     Standing Status:   Future     Standing Expiration Date:   7/11/2023    Insulin, Random     Standing Status:   Future     Standing Expiration Date:   7/11/2023    Vitamin D     Standing Status:   Future     Standing Expiration Date:   7/11/2023

## 2022-05-12 NOTE — TELEPHONE ENCOUNTER
"Pt portal message    Pt wants labs ordered and sent to [employer] [employer] [facility] prior to upcoming appt.    "Good Morning,  I have an appointment in a couple of weeks and I'm sure Dr Elkins wants me to have lab work because its an annual appointment. I would like to do my lab work early so the results can be there for my appointment.Can i  or can you send lab request to me or the lab?  I usually go to [facility]. Thanks in advance for your help. Have a great day"    Please advise  "

## 2022-05-18 LAB
25(OH)D3+25(OH)D2 SERPL-MCNC: 28.2 NG/ML (ref 30–100)
ALBUMIN SERPL-MCNC: 3.9 G/DL (ref 3.8–4.8)
ALBUMIN/CREAT UR: 4 MG/G CREAT (ref 0–29)
ALBUMIN/GLOB SERPL: 1.3 {RATIO} (ref 1.2–2.2)
ALP SERPL-CCNC: 93 IU/L (ref 44–121)
ALT SERPL-CCNC: 13 IU/L (ref 0–32)
AST SERPL-CCNC: 12 IU/L (ref 0–40)
BASOPHILS # BLD AUTO: 0 X10E3/UL (ref 0–0.2)
BASOPHILS NFR BLD AUTO: 0 %
BILIRUB SERPL-MCNC: 0.3 MG/DL (ref 0–1.2)
BUN SERPL-MCNC: 7 MG/DL (ref 6–24)
BUN/CREAT SERPL: 9 (ref 9–23)
CALCIUM SERPL-MCNC: 9 MG/DL (ref 8.7–10.2)
CHLORIDE SERPL-SCNC: 101 MMOL/L (ref 96–106)
CHOLEST SERPL-MCNC: 182 MG/DL (ref 100–199)
CO2 SERPL-SCNC: 27 MMOL/L (ref 20–29)
CREAT SERPL-MCNC: 0.75 MG/DL (ref 0.57–1)
CREAT UR-MCNC: 160.9 MG/DL
EOSINOPHIL # BLD AUTO: 0.3 X10E3/UL (ref 0–0.4)
EOSINOPHIL NFR BLD AUTO: 4 %
ERYTHROCYTE [DISTWIDTH] IN BLOOD BY AUTOMATED COUNT: 12.3 % (ref 11.7–15.4)
EST. GFR  (NO RACE VARIABLE): 101 ML/MIN/1.73
GLOBULIN SER CALC-MCNC: 2.9 G/DL (ref 1.5–4.5)
GLUCOSE SERPL-MCNC: 135 MG/DL (ref 65–99)
HBA1C MFR BLD: 7.2 % (ref 4.8–5.6)
HCT VFR BLD AUTO: 40 % (ref 34–46.6)
HDLC SERPL-MCNC: 42 MG/DL
HGB BLD-MCNC: 13.5 G/DL (ref 11.1–15.9)
IMM GRANULOCYTES # BLD AUTO: 0 X10E3/UL (ref 0–0.1)
IMM GRANULOCYTES NFR BLD AUTO: 0 %
INSULIN SERPL-ACNC: 2.9 UIU/ML (ref 2.6–24.9)
LDLC SERPL CALC-MCNC: 125 MG/DL (ref 0–99)
LYMPHOCYTES # BLD AUTO: 3.2 X10E3/UL (ref 0.7–3.1)
LYMPHOCYTES NFR BLD AUTO: 44 %
MCH RBC QN AUTO: 30.5 PG (ref 26.6–33)
MCHC RBC AUTO-ENTMCNC: 33.8 G/DL (ref 31.5–35.7)
MCV RBC AUTO: 91 FL (ref 79–97)
MICROALBUMIN UR-MCNC: 6.9 UG/ML
MONOCYTES # BLD AUTO: 0.5 X10E3/UL (ref 0.1–0.9)
MONOCYTES NFR BLD AUTO: 7 %
NEUTROPHILS # BLD AUTO: 3.3 X10E3/UL (ref 1.4–7)
NEUTROPHILS NFR BLD AUTO: 45 %
PLATELET # BLD AUTO: 345 X10E3/UL (ref 150–450)
POTASSIUM SERPL-SCNC: 4.2 MMOL/L (ref 3.5–5.2)
PROT SERPL-MCNC: 6.8 G/DL (ref 6–8.5)
RBC # BLD AUTO: 4.42 X10E6/UL (ref 3.77–5.28)
SODIUM SERPL-SCNC: 138 MMOL/L (ref 134–144)
TRIGL SERPL-MCNC: 79 MG/DL (ref 0–149)
TSH SERPL DL<=0.005 MIU/L-ACNC: 3.63 UIU/ML (ref 0.45–4.5)
VLDLC SERPL CALC-MCNC: 15 MG/DL (ref 5–40)
WBC # BLD AUTO: 7.3 X10E3/UL (ref 3.4–10.8)

## 2022-05-26 ENCOUNTER — OFFICE VISIT (OUTPATIENT)
Dept: FAMILY MEDICINE | Facility: CLINIC | Age: 47
End: 2022-05-26
Payer: COMMERCIAL

## 2022-05-26 VITALS
WEIGHT: 191.13 LBS | HEIGHT: 62 IN | BODY MASS INDEX: 35.17 KG/M2 | TEMPERATURE: 98 F | HEART RATE: 85 BPM | SYSTOLIC BLOOD PRESSURE: 122 MMHG | DIASTOLIC BLOOD PRESSURE: 68 MMHG | OXYGEN SATURATION: 98 %

## 2022-05-26 DIAGNOSIS — E55.9 VITAMIN D DEFICIENCY: ICD-10-CM

## 2022-05-26 DIAGNOSIS — N90.7 VULVAR CYST: ICD-10-CM

## 2022-05-26 DIAGNOSIS — E11.9 CONTROLLED TYPE 2 DIABETES MELLITUS WITHOUT COMPLICATION, WITHOUT LONG-TERM CURRENT USE OF INSULIN: ICD-10-CM

## 2022-05-26 DIAGNOSIS — E66.9 OBESITY (BMI 30.0-34.9): ICD-10-CM

## 2022-05-26 DIAGNOSIS — E89.40 SURGICAL MENOPAUSE: ICD-10-CM

## 2022-05-26 DIAGNOSIS — Z79.899 ON ANGIOTENSIN-CONVERTING ENZYME (ACE) INHIBITORS: ICD-10-CM

## 2022-05-26 DIAGNOSIS — Z00.00 ANNUAL PHYSICAL EXAM: Primary | ICD-10-CM

## 2022-05-26 DIAGNOSIS — Z79.899 ON STATIN THERAPY DUE TO RISK OF FUTURE CARDIOVASCULAR EVENT: ICD-10-CM

## 2022-05-26 PROBLEM — E66.811 OBESITY (BMI 30.0-34.9): Status: ACTIVE | Noted: 2020-11-18

## 2022-05-26 PROCEDURE — 3074F SYST BP LT 130 MM HG: CPT | Mod: CPTII,S$GLB,, | Performed by: FAMILY MEDICINE

## 2022-05-26 PROCEDURE — 4010F PR ACE/ARB THEARPY RXD/TAKEN: ICD-10-PCS | Mod: CPTII,S$GLB,, | Performed by: FAMILY MEDICINE

## 2022-05-26 PROCEDURE — 1159F PR MEDICATION LIST DOCUMENTED IN MEDICAL RECORD: ICD-10-PCS | Mod: CPTII,S$GLB,, | Performed by: FAMILY MEDICINE

## 2022-05-26 PROCEDURE — 3008F BODY MASS INDEX DOCD: CPT | Mod: CPTII,S$GLB,, | Performed by: FAMILY MEDICINE

## 2022-05-26 PROCEDURE — 3061F NEG MICROALBUMINURIA REV: CPT | Mod: CPTII,S$GLB,, | Performed by: FAMILY MEDICINE

## 2022-05-26 PROCEDURE — 1160F PR REVIEW ALL MEDS BY PRESCRIBER/CLIN PHARMACIST DOCUMENTED: ICD-10-PCS | Mod: CPTII,S$GLB,, | Performed by: FAMILY MEDICINE

## 2022-05-26 PROCEDURE — 3008F PR BODY MASS INDEX (BMI) DOCUMENTED: ICD-10-PCS | Mod: CPTII,S$GLB,, | Performed by: FAMILY MEDICINE

## 2022-05-26 PROCEDURE — 99396 PR PREVENTIVE VISIT,EST,40-64: ICD-10-PCS | Mod: S$GLB,,, | Performed by: FAMILY MEDICINE

## 2022-05-26 PROCEDURE — 3051F HG A1C>EQUAL 7.0%<8.0%: CPT | Mod: CPTII,S$GLB,, | Performed by: FAMILY MEDICINE

## 2022-05-26 PROCEDURE — 99999 PR PBB SHADOW E&M-EST. PATIENT-LVL IV: ICD-10-PCS | Mod: PBBFAC,,, | Performed by: FAMILY MEDICINE

## 2022-05-26 PROCEDURE — 4010F ACE/ARB THERAPY RXD/TAKEN: CPT | Mod: CPTII,S$GLB,, | Performed by: FAMILY MEDICINE

## 2022-05-26 PROCEDURE — 3066F PR DOCUMENTATION OF TREATMENT FOR NEPHROPATHY: ICD-10-PCS | Mod: CPTII,S$GLB,, | Performed by: FAMILY MEDICINE

## 2022-05-26 PROCEDURE — 99999 PR PBB SHADOW E&M-EST. PATIENT-LVL IV: CPT | Mod: PBBFAC,,, | Performed by: FAMILY MEDICINE

## 2022-05-26 PROCEDURE — 3061F PR NEG MICROALBUMINURIA RESULT DOCUMENTED/REVIEW: ICD-10-PCS | Mod: CPTII,S$GLB,, | Performed by: FAMILY MEDICINE

## 2022-05-26 PROCEDURE — 3066F NEPHROPATHY DOC TX: CPT | Mod: CPTII,S$GLB,, | Performed by: FAMILY MEDICINE

## 2022-05-26 PROCEDURE — 1160F RVW MEDS BY RX/DR IN RCRD: CPT | Mod: CPTII,S$GLB,, | Performed by: FAMILY MEDICINE

## 2022-05-26 PROCEDURE — 3078F DIAST BP <80 MM HG: CPT | Mod: CPTII,S$GLB,, | Performed by: FAMILY MEDICINE

## 2022-05-26 PROCEDURE — 3078F PR MOST RECENT DIASTOLIC BLOOD PRESSURE < 80 MM HG: ICD-10-PCS | Mod: CPTII,S$GLB,, | Performed by: FAMILY MEDICINE

## 2022-05-26 PROCEDURE — 3051F PR MOST RECENT HEMOGLOBIN A1C LEVEL 7.0 - < 8.0%: ICD-10-PCS | Mod: CPTII,S$GLB,, | Performed by: FAMILY MEDICINE

## 2022-05-26 PROCEDURE — 3074F PR MOST RECENT SYSTOLIC BLOOD PRESSURE < 130 MM HG: ICD-10-PCS | Mod: CPTII,S$GLB,, | Performed by: FAMILY MEDICINE

## 2022-05-26 PROCEDURE — 1159F MED LIST DOCD IN RCRD: CPT | Mod: CPTII,S$GLB,, | Performed by: FAMILY MEDICINE

## 2022-05-26 PROCEDURE — 99396 PREV VISIT EST AGE 40-64: CPT | Mod: S$GLB,,, | Performed by: FAMILY MEDICINE

## 2022-05-26 RX ORDER — LISINOPRIL 5 MG/1
5 TABLET ORAL DAILY
Qty: 30 TABLET | Refills: 5 | Status: SHIPPED | OUTPATIENT
Start: 2022-05-26 | End: 2023-02-27 | Stop reason: SDUPTHER

## 2022-05-26 RX ORDER — PRAVASTATIN SODIUM 10 MG/1
10 TABLET ORAL NIGHTLY
Qty: 30 TABLET | Refills: 5 | Status: SHIPPED | OUTPATIENT
Start: 2022-05-26 | End: 2023-02-27 | Stop reason: SDUPTHER

## 2022-05-26 RX ORDER — METFORMIN HYDROCHLORIDE 500 MG/1
500 TABLET ORAL
Qty: 30 TABLET | Refills: 5 | Status: SHIPPED | OUTPATIENT
Start: 2022-05-26 | End: 2023-02-27 | Stop reason: SDUPTHER

## 2022-05-26 NOTE — PROGRESS NOTES
HISTORY OF PRESENT ILLNESS: Ms. Cesar comes in today fasting without taking medication and without acute problems for annual wellness examination.     She states she has not been taking any medications (Pravastatin, Lisinopril) for some time.     END OF LIFE DECISION: She does not have a living will and does desire life support.    Current Outpatient Medications   Medication Sig    acyclovir (ZOVIRAX) 400 MG tablet TAKE 1 TABLET(400 MG) BY MOUTH THREE TIMES DAILY    blood sugar diagnostic (CONTOUR TEST STRIPS) Strp Use twice daily with Contour [place] (Patient not taking: Reported on 5/26/2022)    blood-glucose meter (FREESTYLE LITE METER) kit Use as instructed for CONTOUR METER (Patient not taking: Reported on 5/26/2022)    lancets Misc Use twice daily with Contour Meter (Patient not taking: Reported on 5/26/2022)    lisinopriL (PRINIVIL,ZESTRIL) 5 MG tablet Take 1 tablet (5 mg total) by mouth once daily. (Patient not taking: Reported on 5/26/2022)    pravastatin (PRAVACHOL) 10 MG tablet Take 1 tablet (10 mg total) by mouth every evening. (Patient not taking: Reported on 5/26/2022)     SCREENINGS:   Cholesterol: May 17, 2022.  FFS/Colonoscopy: Never.  Mammogram: January 27, 2022 - okay.    GYN Exam: November 18, 2020 - okay. July 23, 2013 pap smear - okay. No longer needs pap smear.  Dexa Scan: Never.  Eye Exam: January 2022 with [facility] with Dr. Cueto on [address]. She wears glasses.   Dental Exam: Late 2021.   PPD: Never.  Immunizations: Tdap - September 2, 2010, July 4, 2018.  Gardasil - N./A.  Zostavax - N./A.  Pneumovax - October 25, 2018.  Hepatitis B vaccine - December 20, 2018, October 25, 2018, September 6, 2019.  Seasonal Flu - November 18, 2020.  Covid-19 (Pfizer) vaccine series - March 20, 2021, April 10, 2021, January 27, 2022.     ROS:  GENERAL: Denies fever, chills, fatigue, or unusual weight gain. Appetite normal. Weight 89 kg (196 lb 1.6 oz) at November 18, 2020 visit. Does not  "exercise and does not monitor diet.  SKIN: Denies rashes, itching, changes in mole, color or texture of skin or easy bruising.  HEAD: Denies headaches or recent head trauma.  EYES: Denies change in vision, pain, diplopia, redness or discharge. Wears glasses.  EARS: Denies ear pain, discharge, vertigo or decreased hearing.  NOSE: Denies loss of smell, epistaxis or rhinitis.  MOUTH & THROAT: Denies hoarseness or change in voice. Denies excessive gum bleeding or mouth sores. Denies sore throat.  NODES: Denies swollen glands.  CHEST: Denies BIRD, wheezing, cough, or sputum production.  CARDIOVASCULAR: Denies chest pain, PND, orthopnea or reduced exercise tolerance. Denies palpitations.  ABDOMEN: Denies diarrhea, constipation, nausea, vomiting, abdominal pain, or blood in stool.  URINARY: Denies flank pain, dysuria or hematuria.  GENITOURINARY: Denies flank pain, dysuria, frequency or hematuria. Occasionally performs monthly breast self exam.   ENDOCRINE: Denies cholesterol, thyroid problems. Reports not performs home glucose checks.  HEME/LYMPH: Denies bleeding problems.  PERIPHERAL VASCULAR:Denies claudication or cyanosis.  MUSCULOSKELETAL: Denies joint stiffness, pain or swelling. Denies edema.  NEUROLOGIC: Denies history of seizures, tremors, paralysis, alteration of gait or coordination.  PSYCHIATRIC: Denies mood swings, depression, anxiety, homicidal or suicidal thoughts. Denies sleep problems.      PE:   VS: Blood Pressure 122/68   Pulse 85   Temperature 98.1 °F (36.7 °C)   Height 5' 2" (1.575 m)   Weight 86.7 kg (191 lb 2.2 oz)   Oxygen Saturation 98%   Body Mass Index 34.96 kg/m²   APPEARANCE: Well nourished, well developed female, obese and pleasant, alert and oriented, in no acute distress.   HEAD: Non tender. Full range of motion.  EYES: PERRL, conjunctiva pink, lids no edema. She wears glasses.  EARS: External canal patent, no swelling or redness. TM's shiny and clear.  NOSE: Mucosa and turbinates pink, " not swollen. No discharge. Non tender sinuses.  THROAT: No pharyngeal erythema or exudate. No stridor.   NECK: Supple, no mass, thyroid not enlarged.  NODES: No cervical, axillary or inguinal lymph node enlargement.  CHEST: Normal respiratory effort. Lungs clear to auscultation.  CARDIOVASCULAR: Normal S1, S2. No rubs, murmurs or gallops. PMI not displaced. No carotid bruit. Pedal pulses palpable bilaterally. No edema.  ABDOMEN: Bowel sounds present. Not distended. Soft. No tenderness, masses or organomegaly.  BREAST EXAM: Symmetrical, no external lesions, no discharge, no masses palpated.  PELVIC EXAM: No external lesions noted, no discharge, absent cervix and uterus, bimanual exam showed no adnexal masses or tenderness. Urethra and bladder intact. However, non tender cyst at bottom, inner aspect of left vulvar noted.  RECTAL EXAM: Not examined per patient request.  MUSCULOSKELETAL: No joint deformities or stiffness. She is ambulatory without problems.  SKIN: No rashes or suspicious lesions, normal color and turgor.  NEUROLOGIC: Cranial Nerves: II-XII grossly intact. DTR's: Knees, Ankles 2+ and equal bilaterally. Gait & Posture: Normal gait and fine motion.  PSYCHIATRIC: Patient alert, oriented x 3. Mood/Affect normal. Judgment/insight good as she makes appropriate decisions during today's exam.    CMP  Sodium   Date Value Ref Range Status   05/17/2022 138 134 - 144 mmol/L Final     Potassium   Date Value Ref Range Status   05/17/2022 4.2 3.5 - 5.2 mmol/L Final     Chloride   Date Value Ref Range Status   05/17/2022 101 96 - 106 mmol/L Final     CO2   Date Value Ref Range Status   05/17/2022 27 20 - 29 mmol/L Final     Glucose   Date Value Ref Range Status   05/17/2022 135 (H) 65 - 99 mg/dL Final     BUN   Date Value Ref Range Status   05/17/2022 7 6 - 24 mg/dL Final     Creatinine   Date Value Ref Range Status   05/17/2022 0.75 0.57 - 1.00 mg/dL Final     Calcium   Date Value Ref Range Status   05/17/2022 9.0 8.7  - 10.2 mg/dL Final     Total Protein   Date Value Ref Range Status   08/30/2019 6.9 6.0 - 8.5 g/dL Final     Albumin   Date Value Ref Range Status   05/17/2022 3.9 3.8 - 4.8 g/dL Final   08/30/2019 3.8 3.5 - 5.5 g/dL Final     Total Bilirubin   Date Value Ref Range Status   05/17/2022 0.3 0.0 - 1.2 mg/dL Final     Alkaline Phosphatase   Date Value Ref Range Status   08/30/2019 110 39 - 117 IU/L Final     AST   Date Value Ref Range Status   05/17/2022 12 0 - 40 IU/L Final     ALT   Date Value Ref Range Status   05/17/2022 13 0 - 32 IU/L Final     Anion Gap   Date Value Ref Range Status   12/17/2014 6 (L) 8 - 16 mmol/L Final     eGFR if    Date Value Ref Range Status   12/17/2014 >60.0 >60 mL/min/1.73 m^2 Final     eGFR if non    Date Value Ref Range Status   05/17/2021 95 >59 mL/min/1.73 Final     Lab Results   Component Value Date    WBC 7.3 05/17/2022    HGB 13.5 05/17/2022    HCT 40.0 05/17/2022    MCV 91 05/17/2022     05/17/2022     Lab Results   Component Value Date    LDLCALC 125 (H) 05/17/2022     Lab Results   Component Value Date    TSH 3.630 05/17/2022     Lab Results   Component Value Date    HGBA1C 7.2 (H) 05/17/2022     Insulin 2.6 - 24.9 uIU/mL 2.9       05/17/2022     Vit D, 25-Hydroxy 30.0 - 100.0 ng/mL 28.2 Low     5/17/2022     Microalb/Crt. Ratio 0 - 29 mg/g creat 4     5/17/2022       Protective Sensation (w/ 10 gram monofilament):  Right: Intact  Left: Intact    Visual Inspection:  Normal -  Bilateral    Pedal Pulses:   Right: Present  Left: Present    Posterior tibialis:   Right:Present  Left: Present     ASSESSMENT:    ICD-10-CM ICD-9-CM    1. Annual physical exam  Z00.00 V70.0    2. Controlled type 2 diabetes mellitus without complication, without long-term current use of insulin  E11.9 250.00 metFORMIN (GLUCOPHAGE) 500 MG tablet   3. Vitamin D deficiency  E55.9 268.9    4. On angiotensin-converting enzyme (ACE) inhibitors  Z79.899 V07.52 lisinopriL  (PRINIVIL,ZESTRIL) 5 MG tablet   5. On statin therapy due to risk of future cardiovascular event  Z79.899 V58.69 pravastatin (PRAVACHOL) 10 MG tablet   6. Vulvar cyst  N90.7 624.8 Ambulatory referral/consult to Gynecology   7. Surgical menopause  E89.40 627.4    8. Obesity (BMI 30.0-34.9)  E66.9 278.00      PLAN:  1. Age-appropriate counseling-appropriate low-sodium, low-cholesterol, low carbohydrate diet and exercise daily, monthly breast self exam, annual wellness examination.   2. Patient advised to call for results.  3. Continue current medications.  4. Restart Pravastatin and Lisinopril as noted above.  5. Start Metformin 500 mg daily; medication precautions discussed with patient.  6. Add OTC vitamin D 2000 units daily.  7. Keep follow up with specialists.  8. Annual eye and dental examinations advised.  9. Flu shot this fall.  10. Work excuse for today with return today.                  11. Follow up in about 6 months (around 11/29/2022) for diabetes follow up with labs done 1 week prior to appointment.      Answers for HPI/ROS submitted by the patient on 5/25/2022  activity change: No  unexpected weight change: No  neck pain: No  hearing loss: No  rhinorrhea: No  trouble swallowing: No  eye discharge: No  visual disturbance: No  chest tightness: No  wheezing: No  chest pain: No  palpitations: No  blood in stool: No  constipation: No  vomiting: No  diarrhea: No  polydipsia: No  polyuria: No  difficulty urinating: No  hematuria: No  menstrual problem: No  dysuria: No  joint swelling: No  arthralgias: No  headaches: No  weakness: No  confusion: No  dysphoric mood: No

## 2022-05-31 ENCOUNTER — OFFICE VISIT (OUTPATIENT)
Dept: OBSTETRICS AND GYNECOLOGY | Facility: CLINIC | Age: 47
End: 2022-05-31
Payer: COMMERCIAL

## 2022-05-31 VITALS — BODY MASS INDEX: 35.4 KG/M2 | WEIGHT: 193.56 LBS | SYSTOLIC BLOOD PRESSURE: 112 MMHG | DIASTOLIC BLOOD PRESSURE: 72 MMHG

## 2022-05-31 DIAGNOSIS — N75.0 BARTHOLIN GLAND CYST: Primary | ICD-10-CM

## 2022-05-31 PROCEDURE — 99203 OFFICE O/P NEW LOW 30 MIN: CPT | Mod: S$GLB,,, | Performed by: NURSE PRACTITIONER

## 2022-05-31 PROCEDURE — 4010F ACE/ARB THERAPY RXD/TAKEN: CPT | Mod: CPTII,S$GLB,, | Performed by: NURSE PRACTITIONER

## 2022-05-31 PROCEDURE — 99999 PR PBB SHADOW E&M-EST. PATIENT-LVL III: ICD-10-PCS | Mod: PBBFAC,,, | Performed by: NURSE PRACTITIONER

## 2022-05-31 PROCEDURE — 3061F NEG MICROALBUMINURIA REV: CPT | Mod: CPTII,S$GLB,, | Performed by: NURSE PRACTITIONER

## 2022-05-31 PROCEDURE — 1160F RVW MEDS BY RX/DR IN RCRD: CPT | Mod: CPTII,S$GLB,, | Performed by: NURSE PRACTITIONER

## 2022-05-31 PROCEDURE — 3074F PR MOST RECENT SYSTOLIC BLOOD PRESSURE < 130 MM HG: ICD-10-PCS | Mod: CPTII,S$GLB,, | Performed by: NURSE PRACTITIONER

## 2022-05-31 PROCEDURE — 3008F PR BODY MASS INDEX (BMI) DOCUMENTED: ICD-10-PCS | Mod: CPTII,S$GLB,, | Performed by: NURSE PRACTITIONER

## 2022-05-31 PROCEDURE — 3066F NEPHROPATHY DOC TX: CPT | Mod: CPTII,S$GLB,, | Performed by: NURSE PRACTITIONER

## 2022-05-31 PROCEDURE — 4010F PR ACE/ARB THEARPY RXD/TAKEN: ICD-10-PCS | Mod: CPTII,S$GLB,, | Performed by: NURSE PRACTITIONER

## 2022-05-31 PROCEDURE — 1159F MED LIST DOCD IN RCRD: CPT | Mod: CPTII,S$GLB,, | Performed by: NURSE PRACTITIONER

## 2022-05-31 PROCEDURE — 1160F PR REVIEW ALL MEDS BY PRESCRIBER/CLIN PHARMACIST DOCUMENTED: ICD-10-PCS | Mod: CPTII,S$GLB,, | Performed by: NURSE PRACTITIONER

## 2022-05-31 PROCEDURE — 3078F PR MOST RECENT DIASTOLIC BLOOD PRESSURE < 80 MM HG: ICD-10-PCS | Mod: CPTII,S$GLB,, | Performed by: NURSE PRACTITIONER

## 2022-05-31 PROCEDURE — 3074F SYST BP LT 130 MM HG: CPT | Mod: CPTII,S$GLB,, | Performed by: NURSE PRACTITIONER

## 2022-05-31 PROCEDURE — 1159F PR MEDICATION LIST DOCUMENTED IN MEDICAL RECORD: ICD-10-PCS | Mod: CPTII,S$GLB,, | Performed by: NURSE PRACTITIONER

## 2022-05-31 PROCEDURE — 3061F PR NEG MICROALBUMINURIA RESULT DOCUMENTED/REVIEW: ICD-10-PCS | Mod: CPTII,S$GLB,, | Performed by: NURSE PRACTITIONER

## 2022-05-31 PROCEDURE — 3066F PR DOCUMENTATION OF TREATMENT FOR NEPHROPATHY: ICD-10-PCS | Mod: CPTII,S$GLB,, | Performed by: NURSE PRACTITIONER

## 2022-05-31 PROCEDURE — 3078F DIAST BP <80 MM HG: CPT | Mod: CPTII,S$GLB,, | Performed by: NURSE PRACTITIONER

## 2022-05-31 PROCEDURE — 99999 PR PBB SHADOW E&M-EST. PATIENT-LVL III: CPT | Mod: PBBFAC,,, | Performed by: NURSE PRACTITIONER

## 2022-05-31 PROCEDURE — 3008F BODY MASS INDEX DOCD: CPT | Mod: CPTII,S$GLB,, | Performed by: NURSE PRACTITIONER

## 2022-05-31 PROCEDURE — 99203 PR OFFICE/OUTPT VISIT, NEW, LEVL III, 30-44 MIN: ICD-10-PCS | Mod: S$GLB,,, | Performed by: NURSE PRACTITIONER

## 2022-05-31 NOTE — PROGRESS NOTES
Subjective:       Patient ID: Sophia Cesar is a 46 y.o. female.    Chief Complaint:  Cyst    No LMP recorded. Patient has had a hysterectomy.     History of Present Illness  Patient presents to clinic for evaluation of vaginal cyst.  Patient was seen by primary care physician with incidental finding of vaginal cyst.  Patient denies pain or drainage from area.  Patient was unaware that cyst was present prior to examination.    OB History    Para Term  AB Living   2 2 2     2   SAB IAB Ectopic Multiple Live Births                  # Outcome Date GA Lbr Gustavo/2nd Weight Sex Delivery Anes PTL Lv   2 Term            1 Term                Review of Systems  Review of Systems   Constitutional: Negative for appetite change, fatigue and fever.   Gastrointestinal: Negative for abdominal pain, bloating, constipation, diarrhea, nausea and vomiting.   Genitourinary: Negative for bladder incontinence, dysmenorrhea, dyspareunia, dysuria, flank pain, frequency, genital sores, menorrhagia, menstrual problem, pelvic pain, urgency, vaginal bleeding, vaginal discharge, vaginal pain, postcoital bleeding, vaginal dryness and vaginal odor.        Vaginal cyst   All other systems reviewed and are negative.           Objective:      Physical Exam:   Constitutional: She is oriented to person, place, and time. She appears well-developed and well-nourished.    HENT:   Head: Normocephalic and atraumatic.    Eyes: Pupils are equal, round, and reactive to light. Conjunctivae and EOM are normal.     Cardiovascular: Normal rate, regular rhythm and normal heart sounds.     Pulmonary/Chest: Effort normal.        Abdominal: Soft. There is no abdominal tenderness. Hernia confirmed negative in the right inguinal area and confirmed negative in the left inguinal area.     Genitourinary:    Inguinal canal and rectum normal.      Pelvic exam was performed with patient supine.   The external female genitalia was normal.   Genitalia hair  distrobution normal .   Labial bartholins abnormal.There is no rash, tenderness, lesion or injury on the right labia. There is no rash, tenderness, lesion or injury on the left labia.    Genitourinary Comments: Internal exam deferred. Left bartholin gland cyst enlargement. No erythema, edema, or drainage.             Musculoskeletal: Normal range of motion and moves all extremeties.      Lymphadenopathy: No inguinal adenopathy noted on the right or left side.    Neurological: She is alert and oriented to person, place, and time.    Skin: Skin is warm and dry. She is not diaphoretic.    Psychiatric: She has a normal mood and affect. Her behavior is normal. Judgment and thought content normal.            Assessment:     1. Bartholin gland cyst          Plan:   Sophia was seen today for cyst.    Diagnoses and all orders for this visit:    Bartholin gland cyst  -     Ambulatory referral/consult to Gynecology      Reassurance provided. Bartholin Gland cyst discussed in detail.  Patient will return to clinic if begins to experience pain or drainage from area.

## 2022-09-08 ENCOUNTER — PATIENT MESSAGE (OUTPATIENT)
Dept: FAMILY MEDICINE | Facility: CLINIC | Age: 47
End: 2022-09-08
Payer: COMMERCIAL

## 2022-09-14 ENCOUNTER — TELEPHONE (OUTPATIENT)
Dept: FAMILY MEDICINE | Facility: CLINIC | Age: 47
End: 2022-09-14
Payer: COMMERCIAL

## 2022-09-14 NOTE — TELEPHONE ENCOUNTER
"Pt portal message  Pt states     " I dont need an appointment. I was just asking a question. Thanks anyway.     The infection drained out and its not swollen anymore so I wont need an appointment.  "        "
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
## 2022-09-14 NOTE — TELEPHONE ENCOUNTER
Pt has been put on schedule for tomorrow at 4pm. Notified her of appt via [employer] as I could not reach via telephone. Also left voicemail

## 2022-09-14 NOTE — TELEPHONE ENCOUNTER
"Pt portal message    Message previously sent thru portal by other staff    " Good morning   I have had a bump in the corner of my. Eye for some time now and it usually doesnt bother me. Until the last couple days the bump was white and hard. Within the last two days its now red and painful. Can you tell me what I can do about this?"    Please view pics in portal and advise    "

## 2022-09-23 ENCOUNTER — PATIENT OUTREACH (OUTPATIENT)
Dept: ADMINISTRATIVE | Facility: HOSPITAL | Age: 47
End: 2022-09-23
Payer: COMMERCIAL

## 2022-09-23 NOTE — PROGRESS NOTES
Working DM Eyey Report:     Pt overdue for Eye exam. Sent ALY letter to Dr. Cueto  for updated records.

## 2022-09-23 NOTE — LETTER
AUTHORIZATION FOR RELEASE OF   CONFIDENTIAL INFORMATION    Dear Dr. Cueto,    We are seeing Sophia Cesar, date of birth 1975, in the clinic at [facility]. Barbi Elkins MD is the patient's PCP. Sophia Cesar has an outstanding lab/procedure at the time we reviewed her chart. In order to help keep her health information updated, she has authorized us to request the following medical record(s):                                              (  x)  EYE EXAM      [id number]           Please fax records to [facility], Janet W Cook, MD, [phone]     If you have any questions, please contact ARTHUR Son at [phone].           Patient Name: Sophia Cesar  : 1975  Patient Phone #: [phone]      u

## 2022-12-29 ENCOUNTER — PATIENT MESSAGE (OUTPATIENT)
Dept: ADMINISTRATIVE | Facility: HOSPITAL | Age: 47
End: 2022-12-29
Payer: COMMERCIAL

## 2023-01-31 ENCOUNTER — PATIENT MESSAGE (OUTPATIENT)
Dept: INTERNAL MEDICINE | Facility: CLINIC | Age: 48
End: 2023-01-31
Payer: COMMERCIAL

## 2023-02-01 DIAGNOSIS — Z12.31 OTHER SCREENING MAMMOGRAM: ICD-10-CM

## 2023-02-20 ENCOUNTER — TELEPHONE (OUTPATIENT)
Dept: FAMILY MEDICINE | Facility: CLINIC | Age: 48
End: 2023-02-20
Payer: COMMERCIAL

## 2023-02-20 ENCOUNTER — PATIENT MESSAGE (OUTPATIENT)
Dept: FAMILY MEDICINE | Facility: CLINIC | Age: 48
End: 2023-02-20
Payer: COMMERCIAL

## 2023-02-20 DIAGNOSIS — Z00.00 ANNUAL PHYSICAL EXAM: Primary | ICD-10-CM

## 2023-02-20 NOTE — TELEPHONE ENCOUNTER
I have put the following orders and/or medications to this note.  Please advise pt and assist.    Orders Placed This Encounter   Procedures    Comprehensive Metabolic Panel     Standing Status:   Future     Standing Expiration Date:   4/20/2024    Lipid Panel     Standing Status:   Future     Standing Expiration Date:   4/20/2024    CBC Auto Differential     Standing Status:   Future     Standing Expiration Date:   4/20/2024    TSH     Standing Status:   Future     Standing Expiration Date:   4/20/2024    Hemoglobin A1C     Standing Status:   Future     Standing Expiration Date:   4/20/2024    Microalbumin/Creatinine Ratio, Urine     Standing Status:   Future     Standing Expiration Date:   4/20/2024     Order Specific Question:   Specimen Source     Answer:   Urine    Insulin, Random     Standing Status:   Future     Standing Expiration Date:   4/20/2024    HIV 1/2 Ag/Ab (4th Gen)     Standing Status:   Future     Standing Expiration Date:   4/20/2024     Order Specific Question:   Release to patient     Answer:   Immediate

## 2023-02-20 NOTE — TELEPHONE ENCOUNTER
"Pt portal message    " Good Morning  I have an appointment on Monday and wanted to see if Dr Elkins would send my lab orders to [facility] so the results will be ready when I come to the appointment. Also if this is possible please include an HIV test in there as well. Thanks for your help. Have a great day. "    Please advise    "

## 2023-02-22 LAB
ALBUMIN SERPL-MCNC: 3.8 G/DL (ref 3.8–4.8)
ALBUMIN/CREAT UR: <4 MG/G CREAT (ref 0–29)
ALBUMIN/GLOB SERPL: 1.5 {RATIO} (ref 1.2–2.2)
ALP SERPL-CCNC: 87 IU/L (ref 44–121)
ALT SERPL-CCNC: 12 IU/L (ref 0–32)
AST SERPL-CCNC: 12 IU/L (ref 0–40)
BASOPHILS # BLD AUTO: 0 X10E3/UL (ref 0–0.2)
BASOPHILS NFR BLD AUTO: 1 %
BILIRUB SERPL-MCNC: 0.4 MG/DL (ref 0–1.2)
BUN SERPL-MCNC: 8 MG/DL (ref 6–24)
BUN/CREAT SERPL: 11 (ref 9–23)
CALCIUM SERPL-MCNC: 9.3 MG/DL (ref 8.7–10.2)
CHLORIDE SERPL-SCNC: 100 MMOL/L (ref 96–106)
CHOLEST SERPL-MCNC: 170 MG/DL (ref 100–199)
CO2 SERPL-SCNC: 25 MMOL/L (ref 20–29)
CREAT SERPL-MCNC: 0.71 MG/DL (ref 0.57–1)
CREAT UR-MCNC: 78.4 MG/DL
EOSINOPHIL # BLD AUTO: 0.4 X10E3/UL (ref 0–0.4)
EOSINOPHIL NFR BLD AUTO: 6 %
ERYTHROCYTE [DISTWIDTH] IN BLOOD BY AUTOMATED COUNT: 12 % (ref 11.7–15.4)
EST. GFR  (NO RACE VARIABLE): 107 ML/MIN/1.73
GLOBULIN SER CALC-MCNC: 2.6 G/DL (ref 1.5–4.5)
GLUCOSE SERPL-MCNC: 176 MG/DL (ref 70–99)
HBA1C MFR BLD: 8 % (ref 4.8–5.6)
HCT VFR BLD AUTO: 36.5 % (ref 34–46.6)
HDLC SERPL-MCNC: 43 MG/DL
HGB BLD-MCNC: 12.3 G/DL (ref 11.1–15.9)
HIV 1+2 AB+HIV1 P24 AG SERPL QL IA: NON REACTIVE
IMM GRANULOCYTES # BLD AUTO: 0 X10E3/UL (ref 0–0.1)
IMM GRANULOCYTES NFR BLD AUTO: 0 %
INSULIN SERPL-ACNC: 3.8 UIU/ML (ref 2.6–24.9)
LDLC SERPL CALC-MCNC: 117 MG/DL (ref 0–99)
LYMPHOCYTES # BLD AUTO: 2.3 X10E3/UL (ref 0.7–3.1)
LYMPHOCYTES NFR BLD AUTO: 40 %
MCH RBC QN AUTO: 30.8 PG (ref 26.6–33)
MCHC RBC AUTO-ENTMCNC: 33.7 G/DL (ref 31.5–35.7)
MCV RBC AUTO: 92 FL (ref 79–97)
MICROALBUMIN UR-MCNC: <3 UG/ML
MONOCYTES # BLD AUTO: 0.4 X10E3/UL (ref 0.1–0.9)
MONOCYTES NFR BLD AUTO: 7 %
NEUTROPHILS # BLD AUTO: 2.6 X10E3/UL (ref 1.4–7)
NEUTROPHILS NFR BLD AUTO: 46 %
PLATELET # BLD AUTO: 336 X10E3/UL (ref 150–450)
POTASSIUM SERPL-SCNC: 4 MMOL/L (ref 3.5–5.2)
PROT SERPL-MCNC: 6.4 G/DL (ref 6–8.5)
RBC # BLD AUTO: 3.99 X10E6/UL (ref 3.77–5.28)
SODIUM SERPL-SCNC: 135 MMOL/L (ref 134–144)
TRIGL SERPL-MCNC: 49 MG/DL (ref 0–149)
TSH SERPL DL<=0.005 MIU/L-ACNC: 3.27 UIU/ML (ref 0.45–4.5)
VLDLC SERPL CALC-MCNC: 10 MG/DL (ref 5–40)
WBC # BLD AUTO: 5.7 X10E3/UL (ref 3.4–10.8)

## 2023-02-27 ENCOUNTER — OFFICE VISIT (OUTPATIENT)
Dept: FAMILY MEDICINE | Facility: CLINIC | Age: 48
End: 2023-02-27
Payer: COMMERCIAL

## 2023-02-27 ENCOUNTER — PATIENT MESSAGE (OUTPATIENT)
Dept: FAMILY MEDICINE | Facility: CLINIC | Age: 48
End: 2023-02-27

## 2023-02-27 ENCOUNTER — PATIENT MESSAGE (OUTPATIENT)
Dept: RESEARCH | Facility: CLINIC | Age: 48
End: 2023-02-27
Payer: COMMERCIAL

## 2023-02-27 VITALS
HEIGHT: 62 IN | DIASTOLIC BLOOD PRESSURE: 80 MMHG | OXYGEN SATURATION: 99 % | TEMPERATURE: 97 F | HEART RATE: 86 BPM | SYSTOLIC BLOOD PRESSURE: 130 MMHG | RESPIRATION RATE: 18 BRPM | WEIGHT: 201.75 LBS | BODY MASS INDEX: 37.13 KG/M2

## 2023-02-27 DIAGNOSIS — E11.9 CONTROLLED TYPE 2 DIABETES MELLITUS WITHOUT COMPLICATION, WITHOUT LONG-TERM CURRENT USE OF INSULIN: ICD-10-CM

## 2023-02-27 DIAGNOSIS — Z79.899 ON ANGIOTENSIN-CONVERTING ENZYME (ACE) INHIBITORS: ICD-10-CM

## 2023-02-27 DIAGNOSIS — Z00.00 ANNUAL PHYSICAL EXAM: Primary | ICD-10-CM

## 2023-02-27 DIAGNOSIS — E66.01 SEVERE OBESITY (BMI 35.0-39.9) WITH COMORBIDITY: ICD-10-CM

## 2023-02-27 DIAGNOSIS — Z79.899 ON STATIN THERAPY DUE TO RISK OF FUTURE CARDIOVASCULAR EVENT: ICD-10-CM

## 2023-02-27 DIAGNOSIS — Z12.11 COLON CANCER SCREENING: ICD-10-CM

## 2023-02-27 PROCEDURE — 99999 PR PBB SHADOW E&M-EST. PATIENT-LVL IV: ICD-10-PCS | Mod: PBBFAC,,, | Performed by: FAMILY MEDICINE

## 2023-02-27 PROCEDURE — 99396 PR PREVENTIVE VISIT,EST,40-64: ICD-10-PCS | Mod: S$GLB,,, | Performed by: FAMILY MEDICINE

## 2023-02-27 PROCEDURE — 1160F PR REVIEW ALL MEDS BY PRESCRIBER/CLIN PHARMACIST DOCUMENTED: ICD-10-PCS | Mod: CPTII,S$GLB,, | Performed by: FAMILY MEDICINE

## 2023-02-27 PROCEDURE — 1159F MED LIST DOCD IN RCRD: CPT | Mod: CPTII,S$GLB,, | Performed by: FAMILY MEDICINE

## 2023-02-27 PROCEDURE — 99999 PR PBB SHADOW E&M-EST. PATIENT-LVL IV: CPT | Mod: PBBFAC,,, | Performed by: FAMILY MEDICINE

## 2023-02-27 PROCEDURE — 1160F RVW MEDS BY RX/DR IN RCRD: CPT | Mod: CPTII,S$GLB,, | Performed by: FAMILY MEDICINE

## 2023-02-27 PROCEDURE — 3079F DIAST BP 80-89 MM HG: CPT | Mod: CPTII,S$GLB,, | Performed by: FAMILY MEDICINE

## 2023-02-27 PROCEDURE — 3075F PR MOST RECENT SYSTOLIC BLOOD PRESS GE 130-139MM HG: ICD-10-PCS | Mod: CPTII,S$GLB,, | Performed by: FAMILY MEDICINE

## 2023-02-27 PROCEDURE — 3008F BODY MASS INDEX DOCD: CPT | Mod: CPTII,S$GLB,, | Performed by: FAMILY MEDICINE

## 2023-02-27 PROCEDURE — 3052F HG A1C>EQUAL 8.0%<EQUAL 9.0%: CPT | Mod: CPTII,S$GLB,, | Performed by: FAMILY MEDICINE

## 2023-02-27 PROCEDURE — 3052F PR MOST RECENT HEMOGLOBIN A1C LEVEL 8.0 - < 9.0%: ICD-10-PCS | Mod: CPTII,S$GLB,, | Performed by: FAMILY MEDICINE

## 2023-02-27 PROCEDURE — 3061F PR NEG MICROALBUMINURIA RESULT DOCUMENTED/REVIEW: ICD-10-PCS | Mod: CPTII,S$GLB,, | Performed by: FAMILY MEDICINE

## 2023-02-27 PROCEDURE — 3008F PR BODY MASS INDEX (BMI) DOCUMENTED: ICD-10-PCS | Mod: CPTII,S$GLB,, | Performed by: FAMILY MEDICINE

## 2023-02-27 PROCEDURE — 4010F PR ACE/ARB THEARPY RXD/TAKEN: ICD-10-PCS | Mod: CPTII,S$GLB,, | Performed by: FAMILY MEDICINE

## 2023-02-27 PROCEDURE — 3061F NEG MICROALBUMINURIA REV: CPT | Mod: CPTII,S$GLB,, | Performed by: FAMILY MEDICINE

## 2023-02-27 PROCEDURE — 99396 PREV VISIT EST AGE 40-64: CPT | Mod: S$GLB,,, | Performed by: FAMILY MEDICINE

## 2023-02-27 PROCEDURE — 3066F NEPHROPATHY DOC TX: CPT | Mod: CPTII,S$GLB,, | Performed by: FAMILY MEDICINE

## 2023-02-27 PROCEDURE — 4010F ACE/ARB THERAPY RXD/TAKEN: CPT | Mod: CPTII,S$GLB,, | Performed by: FAMILY MEDICINE

## 2023-02-27 PROCEDURE — 3075F SYST BP GE 130 - 139MM HG: CPT | Mod: CPTII,S$GLB,, | Performed by: FAMILY MEDICINE

## 2023-02-27 PROCEDURE — 1159F PR MEDICATION LIST DOCUMENTED IN MEDICAL RECORD: ICD-10-PCS | Mod: CPTII,S$GLB,, | Performed by: FAMILY MEDICINE

## 2023-02-27 PROCEDURE — 3079F PR MOST RECENT DIASTOLIC BLOOD PRESSURE 80-89 MM HG: ICD-10-PCS | Mod: CPTII,S$GLB,, | Performed by: FAMILY MEDICINE

## 2023-02-27 PROCEDURE — 3066F PR DOCUMENTATION OF TREATMENT FOR NEPHROPATHY: ICD-10-PCS | Mod: CPTII,S$GLB,, | Performed by: FAMILY MEDICINE

## 2023-02-27 RX ORDER — METFORMIN HYDROCHLORIDE 500 MG/1
500 TABLET ORAL
Qty: 90 TABLET | Refills: 2 | Status: SHIPPED | OUTPATIENT
Start: 2023-02-27 | End: 2024-02-27 | Stop reason: SDUPTHER

## 2023-02-27 RX ORDER — SEMAGLUTIDE 1.34 MG/ML
0.25 INJECTION, SOLUTION SUBCUTANEOUS
Qty: 1 PEN | Refills: 5 | Status: SHIPPED | OUTPATIENT
Start: 2023-02-27 | End: 2023-09-08 | Stop reason: SDUPTHER

## 2023-02-27 RX ORDER — LISINOPRIL 5 MG/1
5 TABLET ORAL DAILY
Qty: 90 TABLET | Refills: 2 | Status: SHIPPED | OUTPATIENT
Start: 2023-02-27 | End: 2024-02-27 | Stop reason: SDUPTHER

## 2023-02-27 RX ORDER — PRAVASTATIN SODIUM 10 MG/1
10 TABLET ORAL NIGHTLY
Qty: 90 TABLET | Refills: 2 | Status: SHIPPED | OUTPATIENT
Start: 2023-02-27 | End: 2024-02-27 | Stop reason: SDUPTHER

## 2023-02-27 NOTE — PROGRESS NOTES
HISTORY OF PRESENT ILLNESS: Ms. Cesar comes in today not fasting without taking medication and without acute problems for annual wellness examination. She reports no acute problems today.     She states she has not been taking any medications (Pravastatin, Lisinopril, Metformin) for some time.     END OF LIFE DECISION: She does not have a living will and does desire life support.    Current Outpatient Medications   Medication Sig    acyclovir (ZOVIRAX) 400 MG tablet TAKE 1 TABLET(400 MG) BY MOUTH THREE TIMES DAILY (Patient not taking: Reported on 2/27/2023)    lisinopriL (PRINIVIL,ZESTRIL) 5 MG tablet Take 1 tablet (5 mg total) by mouth once daily. (Patient not taking: Reported on 2/27/2023)    metFORMIN (GLUCOPHAGE) 500 MG tablet Take 1 tablet (500 mg total) by mouth daily with breakfast. (Patient not taking: Reported on 2/27/2023)    pravastatin (PRAVACHOL) 10 MG tablet Take 1 tablet (10 mg total) by mouth every evening. (Patient not taking: Reported on 2/27/2023)     SCREENINGS:   Cholesterol: February 23, 2023.  FFS/Colonoscopy: Never. She desires.  Mammogram: February 8, 2023 - okay.    GYN Exam: May 26, 2022 - okay. July 23, 2013 pap smear - okay. No longer needs pap smear.  Dexa Scan: Never.  Eye Exam: January 2022 with [facility] with Dr. Cueto on [facility]. She wears glasses.   Dental Exam: Summer 2022.   PPD: Never.  Immunizations: Tdap - September 2, 2010, July 4, 2018.  Gardasil - N./A.  Zostavax - N./A.  Pneumovax - October 25, 2018.  Prevnar - Never. She declines.  Hepatitis B vaccine - December 20, 2018, October 25, 2018, September 6, 2019.  Seasonal Flu - November 18, 2022.  Covid-19 (Pfizer) vaccine series - March 20, 2021, April 10, 2021, January 27, 2022.     ROS:  GENERAL: Denies fever, chills, fatigue, or unusual weight gain. Appetite normal. Weight 86.7 kg (191 lb 2.2 oz) at May 26, 2022 visit. Does not exercise and does not monitor diet.  SKIN: Denies rashes, itching, changes in mole,  "color or texture of skin or easy bruising. Except reports noticed rash at/near her right knee without itching (possibly chronic).  HEAD: Denies headaches or recent head trauma.  EYES: Denies change in vision, pain, diplopia, redness or discharge. Wears glasses.  EARS: Denies ear pain, discharge, vertigo or decreased hearing.  NOSE: Denies loss of smell, epistaxis or rhinitis.  MOUTH & THROAT: Denies hoarseness or change in voice. Denies excessive gum bleeding or mouth sores. Denies sore throat.  NODES: Denies swollen glands.  CHEST: Denies BIRD, wheezing, cough, or sputum production.  CARDIOVASCULAR: Denies chest pain, PND, orthopnea or reduced exercise tolerance. Denies palpitations.  ABDOMEN: Denies diarrhea, constipation, nausea, vomiting, abdominal pain, or blood in stool.  URINARY: Denies flank pain, dysuria or hematuria.  GENITOURINARY: Denies flank pain, dysuria, frequency or hematuria. Does not perform monthly breast self exam.   ENDOCRINE: Denies cholesterol, thyroid problems. Reports not performs home glucose checks.  HEME/LYMPH: Denies bleeding problems.  PERIPHERAL VASCULAR:Denies claudication or cyanosis.  MUSCULOSKELETAL: Denies joint stiffness, pain or swelling. Denies edema.  NEUROLOGIC: Denies history of seizures, tremors, paralysis, alteration of gait or coordination.  PSYCHIATRIC: Denies mood swings, depression, anxiety, homicidal or suicidal thoughts. Denies sleep problems.      PE:   VS: Blood Pressure 130/80 (BP Location: Left arm, Patient Position: Sitting, BP Method: Medium (Automatic))   Pulse 86   Temperature 97.4 °F (36.3 °C)   Respiration 18   Height 5' 2" (1.575 m)   Weight 91.5 kg (201 lb 11.5 oz)   Oxygen Saturation 99%   Body Mass Index 36.90 kg/m²   APPEARANCE: Well nourished, well developed female, obese and pleasant, alert and oriented, in no acute distress.   HEAD: Non tender. Full range of motion.  EYES: PERRL, conjunctiva pink, lids no edema. She wears glasses.  EARS: " External canal patent, no swelling or redness. TM's shiny and clear.  NOSE: Mucosa and turbinates pink, not swollen. No discharge. Non tender sinuses.  THROAT: No pharyngeal erythema or exudate. No stridor.   NECK: Supple, no mass, thyroid not enlarged.  NODES: No cervical, axillary or inguinal lymph node enlargement.  CHEST: Normal respiratory effort. Lungs clear to auscultation.  CARDIOVASCULAR: Normal S1, S2. No rubs, murmurs or gallops. PMI not displaced. No carotid bruit. Pedal pulses palpable bilaterally. No edema.  ABDOMEN: Bowel sounds present. Not distended. Soft. No tenderness, masses or organomegaly.  BREAST EXAM: Symmetrical, no external lesions, no discharge, no masses palpated.  PELVIC EXAM: No external lesions noted, no discharge, absent cervix and uterus, bimanual exam showed no adnexal masses or tenderness. Urethra and bladder intact. However, chronic, non tender cyst at bottom, inner aspect of left vulvar noted.  RECTAL EXAM: Not examined per patient request.  MUSCULOSKELETAL: No joint deformities or stiffness. She is ambulatory without problems.  SKIN: No rashes or suspicious lesions, normal color and turgor.  NEUROLOGIC: Cranial Nerves: II-XII grossly intact. DTR's: Knees, Ankles 2+ and equal bilaterally. Gait & Posture: Normal gait and fine motion.  PSYCHIATRIC: Patient alert, oriented x 3. Mood/Affect normal. Judgment/insight good as she makes appropriate decisions during today's exam.    CMP  Sodium   Date Value Ref Range Status   02/21/2023 135 134 - 144 mmol/L Final     Potassium   Date Value Ref Range Status   02/21/2023 4.0 3.5 - 5.2 mmol/L Final     Chloride   Date Value Ref Range Status   02/21/2023 100 96 - 106 mmol/L Final     CO2   Date Value Ref Range Status   02/21/2023 25 20 - 29 mmol/L Final     Glucose   Date Value Ref Range Status   02/21/2023 176 (H) 70 - 99 mg/dL Final     BUN   Date Value Ref Range Status   02/21/2023 8 6 - 24 mg/dL Final     Creatinine   Date Value Ref Range  Status   02/21/2023 0.71 0.57 - 1.00 mg/dL Final     Calcium   Date Value Ref Range Status   02/21/2023 9.3 8.7 - 10.2 mg/dL Final     Total Protein   Date Value Ref Range Status   08/30/2019 6.9 6.0 - 8.5 g/dL Final     Albumin   Date Value Ref Range Status   02/21/2023 3.8 3.8 - 4.8 g/dL Final   08/30/2019 3.8 3.5 - 5.5 g/dL Final     Total Bilirubin   Date Value Ref Range Status   02/21/2023 0.4 0.0 - 1.2 mg/dL Final     Alkaline Phosphatase   Date Value Ref Range Status   08/30/2019 110 39 - 117 IU/L Final     AST   Date Value Ref Range Status   02/21/2023 12 0 - 40 IU/L Final     ALT   Date Value Ref Range Status   02/21/2023 12 0 - 32 IU/L Final     Anion Gap   Date Value Ref Range Status   12/17/2014 6 (L) 8 - 16 mmol/L Final     eGFR if    Date Value Ref Range Status   12/17/2014 >60.0 >60 mL/min/1.73 m^2 Final     eGFR if non    Date Value Ref Range Status   05/17/2021 95 >59 mL/min/1.73 Final     Lab Results   Component Value Date    WBC 5.7 02/21/2023    HGB 12.3 02/21/2023    HCT 36.5 02/21/2023    MCV 92 02/21/2023     02/21/2023     Lab Results   Component Value Date    LDLCALC 117 (H) 02/21/2023     Lab Results   Component Value Date    TSH 3.270 02/21/2023     Lab Results   Component Value Date    HGBA1C 8.0 (H) 02/21/2023     Insulin 2.6 - 24.9 uIU/mL 3.8      Microalb/Crt. Ratio 0 - 29 mg/g creat <4      HIV Screen 4th Generation wRfx Non Reactive Non Reactive          Protective Sensation (w/ 10 gram monofilament):  Right: Intact  Left: Intact    Visual Inspection:  Normal -  Bilateral    Pedal Pulses:   Right: Present  Left: Present    Posterior tibialis:   Right:Present  Left: Present     ASSESSMENT:    ICD-10-CM ICD-9-CM    1. Annual physical exam  Z00.00 V70.0       2. Controlled type 2 diabetes mellitus without complication, without long-term current use of insulin  E11.9 250.00 semaglutide (OZEMPIC) 0.25 mg or 0.5 mg(2 mg/1.5 mL) pen injector       metFORMIN (GLUCOPHAGE) 500 MG tablet      3. On angiotensin-converting enzyme (ACE) inhibitors  Z79.899 V07.52 lisinopriL (PRINIVIL,ZESTRIL) 5 MG tablet      4. On statin therapy due to risk of future cardiovascular event  Z79.899 V58.69 pravastatin (PRAVACHOL) 10 MG tablet      5. Severe obesity (BMI 35.0-39.9) with comorbidity  E66.01 278.01       6. Colon cancer screening  Z12.11 V76.51 Ambulatory referral/consult to Endo Procedure           PLAN:  1. Age-appropriate counseling-appropriate low-sodium, low-cholesterol, low carbohydrate diet and exercise daily, monthly breast self exam, annual wellness examination.   2. Patient advised to call for results.  3. Continue current medications.  4. Restart Metformin, Pravastatin and Lisinopril as noted above.  5. Start Ozempic 0.25 mg weekly; medication precautions discussed with patient.  6. Add OTC vitamin D 2000 units daily.  7. Keep follow up with specialists.  8. Annual eye and dental examinations advised.                  9. Follow up in about 3 months (around 5/26/2023) for diabetes follow up.    Answers submitted by the patient for this visit:  Review of Systems Questionnaire (Submitted on 2/24/2023)  activity change: No  unexpected weight change: No  neck pain: No  hearing loss: No  rhinorrhea: No  trouble swallowing: No  eye discharge: No  visual disturbance: No  chest tightness: No  wheezing: No  chest pain: No  palpitations: No  blood in stool: No  constipation: No  vomiting: No  diarrhea: No  polydipsia: No  polyuria: No  difficulty urinating: No  hematuria: No  menstrual problem: No  dysuria: No  joint swelling: No  arthralgias: No  headaches: No  weakness: No  confusion: No  dysphoric mood: No

## 2023-02-28 ENCOUNTER — DOCUMENTATION ONLY (OUTPATIENT)
Dept: RESEARCH | Facility: CLINIC | Age: 48
End: 2023-02-28
Payer: COMMERCIAL

## 2023-02-28 ENCOUNTER — TELEPHONE (OUTPATIENT)
Dept: FAMILY MEDICINE | Facility: CLINIC | Age: 48
End: 2023-02-28
Payer: COMMERCIAL

## 2023-02-28 DIAGNOSIS — Z00.6 RESEARCH SUBJECT: Primary | ICD-10-CM

## 2023-02-28 DIAGNOSIS — B00.9 HSV-2 INFECTION: Primary | ICD-10-CM

## 2023-02-28 RX ORDER — ACYCLOVIR 400 MG/1
TABLET ORAL
Qty: 180 TABLET | Refills: 1 | Status: SHIPPED | OUTPATIENT
Start: 2023-02-28 | End: 2023-09-01

## 2023-02-28 NOTE — PROGRESS NOTES
PROPEL IT CONSENT DOCUMENTATION  Oncore Protocol 2022013: PROPEL IT  PROmoting Successful Weight Loss in Primary CarE in [place] (PROPEL) using Information Technology  : Wellington Guy, PhD.  IRB of Record: [facility] ID# [facility] [id number]  [facility] Investigator: Alyssa Prado MD      Informed Consent Process   Name of Study Team member Present for discussion: N/A   Was the consent done electronically: yes  Is the potential subject currently enrolled in any other research trials (per Epic)? no  Prior to the Informed Consent being signed or any protocol required testing, procedure or intervention being performed, the following was completed or discussed:   Purpose of the study, qualifications to participate:  Study design, schedule and procedure:  Risks, benefits, alternative treatments, compensation and costs:  Confidentiality and HIPAA authorization for Release of Medical Records for the research trial/subjects right/study related injury:  Study related contact information:  Voluntary participation and withdrawal from the research trial at any time:  Patient has been offered the opportunity to ask questions regarding the study    and PI contact information given to subject:  Signed copy of consent form was provided to the subject via eMail:  Original consent or copy of electronic consent in subject's chart and uploaded in EPIC:        Sophia Cesar electronically signed the informed consent form.   The consent form as reviewed by Annabelle Juarez  Name: ([facility] personnel reviewing consent form):  Leda Chan, Associate Clinical Research Coordinator   Comments: See  for Consent Forms        I acknowledge that I have reviewed the informed consent form and viewed the volunteer's electronically signed and dated the signature section of the consent forms.yes    Participant ID (REDCap ID) added to Research Study  yes]

## 2023-02-28 NOTE — TELEPHONE ENCOUNTER
Advise pt    Okay to take Acyclovir 400 mg twice daily.   I have put the following orders and/or medications to this note.  Please advise pt and assist.    No orders of the defined types were placed in this encounter.      Medications Ordered This Encounter   Medications    acyclovir (ZOVIRAX) 400 MG tablet     Sig: TAKE 1 TABLET(400 MG) BY MOUTH TWO TIMES DAILY     Dispense:  180 tablet     Refill:  1

## 2023-02-28 NOTE — PROGRESS NOTES
"PROPEL IT Weight Loss: Eligibility Confirmation  Remember to add Participant ID in Research Study  Age as of Today: 47 y.o.    Is patient age 40 to 70 years yes    Is patient race Black/: Yes Epic: Black or      Primary care provider at [facility]: Barbi Elkins MD     Does the patient have an active [facility] portal account?  yes    Does the patient have prediabetes or Type 2 diabetes? yes  If yes, Based on: Type II diabetes ICD10 code    LAST HBA1C   Lab Results   Component Value Date    HGBA1C 8.0 (H) 02/21/2023    HGBA1C 7.2 (H) 05/17/2022    HGBA1C 6.1 (H) 05/17/2021          LAST BMI:   BMI Readings from Last 1 Encounters:   02/27/23 36.90 kg/m²      Was the patient's most recent BMI (within the past 12 months) between 30 and 50  yes    PCP REFERRAL  Did provider acknowledge or deny patient's participation? Need to Pend Order to PCP (after Consent)    LAST WEIGHT  (verify if this was an outpatient):   Wt Readings from Last 4 Encounters:   02/27/23 91.5 kg (201 lb 11.5 oz)   05/31/22 87.8 kg (193 lb 9 oz)   05/26/22 86.7 kg (191 lb 2.2 oz)   01/27/22 88.9 kg (196 lb)          PROPEL-IT Screening Date (enter from [facility]): 2/27/2023  Does the patient have a baseline clinic weight collected within 4 weeks (34 days) of Screening Date?  Yes, has baseline clinical wt     *2. If the "Last Weight at Encounter" is not from outpatient visit or not within 34 days from today enter the next  eligible baseline clinic weight when it occurs.  Weight (kg)                        Date:                     *3. If the last weight is outside of the Screening date window (4 weeks /34 days)  when is the patient's next scheduled clinic appointment?  N/A, pt has baseline wt    Status Updated: 02/28/2023      "

## 2023-02-28 NOTE — TELEPHONE ENCOUNTER
"Pt portal message    " Dr Elkins  Today we discussed about the daily dosage of acyclovir. And that if I wanted to get on  a medication to prevent transmission of the herpes I would have to take a daily dose of it. Well I want to do that if possible. Let me know what I need to do on my end . Thank you in advance"    Please advise    "

## 2023-03-02 ENCOUNTER — RESEARCH ENCOUNTER (OUTPATIENT)
Dept: RESEARCH | Facility: CLINIC | Age: 48
End: 2023-03-02
Payer: COMMERCIAL

## 2023-03-02 ENCOUNTER — PATIENT MESSAGE (OUTPATIENT)
Dept: FAMILY MEDICINE | Facility: CLINIC | Age: 48
End: 2023-03-02
Payer: COMMERCIAL

## 2023-03-02 DIAGNOSIS — Z00.6 RESEARCH SUBJECT: ICD-10-CM

## 2023-03-03 ENCOUNTER — PATIENT MESSAGE (OUTPATIENT)
Dept: FAMILY MEDICINE | Facility: CLINIC | Age: 48
End: 2023-03-03
Payer: COMMERCIAL

## 2023-03-07 ENCOUNTER — TELEPHONE (OUTPATIENT)
Dept: RESEARCH | Facility: CLINIC | Age: 48
End: 2023-03-07
Payer: COMMERCIAL

## 2023-03-08 ENCOUNTER — PATIENT MESSAGE (OUTPATIENT)
Dept: ADMINISTRATIVE | Facility: OTHER | Age: 48
End: 2023-03-08
Payer: COMMERCIAL

## 2023-03-08 NOTE — TELEPHONE ENCOUNTER
"Scale Delivery Service Confirmation Date in REDCap: 3/3/2023  Address delivered To in [facility]: [address], [place] [address]    Study Overview explained ([facility] , [facility] team, [facility] Research Coordinator) yes    Scale Received? YES    Pt has "first weights" to share: no    RECEIVED SCALE  ONLY  BodyTrace Scale Basic Tips reviewed (scale surface, morning, minimal clothing, not waterproof) yes    Pt requested assistance (to talk through) for the scale setup:   Yes AND scale worked properly    Email Reminders explained: Explain any No/NA  Lets take you first PROPEL weight containing link to enter weights Yes (please explain)     Check off "Call completed" on Intervention Onboarding form to trigger "first weight email"Yes     EVERYONE  Health  visit scheduled for 3/15/2023 at 2:00 pm with Giacomo Greer.  Comments]: Unanswered questions: None  Remind pt to save the Health  number to their contacts: [phone]  ?   Post Call Follow-up: Call Completed      Reminders:   Research Studies: Add Branch B Intervention  Research Study Calendar: Edit Plan> Visit 1 Planned for Date >select date of 1st scheduled visit  Episodes of Care: PROPEL; PROPEL IT - ARM B  Care Teams: Add users, Admission notification, End Date (2y +1M)    "

## 2023-03-15 ENCOUNTER — PATIENT OUTREACH (OUTPATIENT)
Dept: RESEARCH | Facility: CLINIC | Age: 48
End: 2023-03-15
Payer: COMMERCIAL

## 2023-03-15 DIAGNOSIS — Z00.6 RESEARCH SUBJECT: ICD-10-CM

## 2023-03-15 NOTE — PROGRESS NOTES
03/15/2023  2:04 PM    Patient Name Sophia Cesar   Appointment Department [facility] WEIGHT MANAGEMENT  Visit Type Audio (Virtual visit)    Clinic Weights   Wt Readings from Last 3 Encounters:   02/27/23 1621 91.5 kg (201 lb 11.5 oz)   05/31/22 0728 87.8 kg (193 lb 9 oz)   05/26/22 0749 86.7 kg (191 lb 2.2 oz)     Last Reported Weights No data was found      [facility] INTERVENTION CONTACT:   Method of contact:  Phone Call   or Participant-Initiated Contact?:  -initiated contact  Type of intervention Contact:  Scheduled Session  Did the participant attend session?: Yes    Was the patient called within 15 minutes of the scheduled appointment?:  Yes  Is this a make-up session?: No    Which session materials covered in session?:  Session 1  Patient Reported Weight (From External Jadiel):  194  Patient-reported weekly minutes of physical activity::  60  Average Daily Steps: none reported.  Calorie Prescription::  1900  Safety Criteria Triggered:  None  Toolbox Triggered:  None  Weight Graph Stoplight Status for Dietary Adherence:  Green Light     Goals    None          Additional Notes:    Patient states her motivation for losing weight to hopefully reverse her type 2 diabetes and get off of her diabetes medications. She feels that she will be able to get meal plan groceries this week. Recently started walking on treadmill for 1 mile twice a week, - her daughter holds her accountable and goes to the gym with her.     Goals:  1) Grocery shop for meal plan foods  2) Walk on treadmill 1 mile twice this week     Giacomo Greer MS, RD, LDN  [facility]   [phone]

## 2023-03-22 ENCOUNTER — PATIENT OUTREACH (OUTPATIENT)
Dept: RESEARCH | Facility: CLINIC | Age: 48
End: 2023-03-22
Payer: COMMERCIAL

## 2023-03-22 NOTE — PROGRESS NOTES
03/22/2023  2:06 PM    Patient Name Sophia Cesar   Appointment Department [facility] PROP WEIGHT MANAGEMENT  Visit Type Audio (Virtual visit)    Clinic Weights   Wt Readings from Last 3 Encounters:   02/27/23 1621 91.5 kg (201 lb 11.5 oz)   05/31/22 0728 87.8 kg (193 lb 9 oz)   05/26/22 0749 86.7 kg (191 lb 2.2 oz)     Last Reported Weights No data was found      [facility] INTERVENTION CONTACT:   Method of contact:  Phone Call   or Participant-Initiated Contact?:  -initiated contact  Type of intervention Contact:  Scheduled Session  Did the participant attend session?: Yes    Was the patient called within 15 minutes of the scheduled appointment?:  Yes  Is this a make-up session?: No    Which session materials covered in session?:  Session 2  Patient Reported Weight (From External Jadiel):  193.1  Patient-reported weekly minutes of physical activity::  60  Average Daily Steps: none reported.  Calorie Prescription::  1900  Safety Criteria Triggered:  None  Toolbox Triggered:  None  Weight Graph Stoplight Status for Dietary Adherence:  Yellow Light - In the Zone     Goals         Grocery shop for meal plan foods (pt-stated)                 Walk 1 mile on treadmill twice a week (pt-stated)              Additional Notes:    Patient wasn't able to go to the grocery for meal plan foods until last weekend. She has not enjoyed the frozen meals and we discussed flavoring options and other pre-portioned ideas. We reviewed her weight graph and using the tool box. Patient successfully went to the gym twice last week and wants to aim for three days this week.    Goals:  1) Go to the gym three times this week  2) Continue following the meal plan    Giacomo Greer, MS, RD, LDN  [facility]   [phone]

## 2023-03-27 ENCOUNTER — TELEPHONE (OUTPATIENT)
Dept: FAMILY MEDICINE | Facility: CLINIC | Age: 48
End: 2023-03-27

## 2023-03-27 ENCOUNTER — PATIENT MESSAGE (OUTPATIENT)
Dept: RESEARCH | Facility: CLINIC | Age: 48
End: 2023-03-27
Payer: COMMERCIAL

## 2023-03-27 ENCOUNTER — PATIENT MESSAGE (OUTPATIENT)
Dept: FAMILY MEDICINE | Facility: CLINIC | Age: 48
End: 2023-03-27
Payer: COMMERCIAL

## 2023-03-27 NOTE — TELEPHONE ENCOUNTER
This is not urgent or anything like that... but i was wondering if you might know if there is a place to get free diabetic supplies... also what is the requirements for the meters that give readings on the phone... thanks in advance for the information.    Per Portal

## 2023-03-29 ENCOUNTER — PATIENT MESSAGE (OUTPATIENT)
Dept: RESEARCH | Facility: CLINIC | Age: 48
End: 2023-03-29

## 2023-03-29 ENCOUNTER — PATIENT OUTREACH (OUTPATIENT)
Dept: RESEARCH | Facility: CLINIC | Age: 48
End: 2023-03-29
Payer: COMMERCIAL

## 2023-03-29 NOTE — PROGRESS NOTES
03/29/2023  2:00 PM    Patient Name Sophia Cesar   Appointment Department [facility] PROP WEIGHT MANAGEMENT  Visit Type Audio (Virtual visit)    Clinic Weights   Wt Readings from Last 3 Encounters:   02/27/23 1621 91.5 kg (201 lb 11.5 oz)   05/31/22 0728 87.8 kg (193 lb 9 oz)   05/26/22 0749 86.7 kg (191 lb 2.2 oz)     Last Reported Weights No data was found      [facility] INTERVENTION CONTACT:   Method of contact:  Phone Call   or Participant-Initiated Contact?:  -initiated contact  Type of intervention Contact:  Scheduled Session  Did the participant attend session?: Yes    Was the patient called within 15 minutes of the scheduled appointment?:  Yes  Is this a make-up session?: No    Which session materials covered in session?:  Session 3  Patient Reported Weight (From External Jadiel):  191.6  Patient-reported weekly minutes of physical activity::  135  Average Daily Steps: none reported.  Calorie Prescription::  1900  Safety Criteria Triggered:  None  Toolbox Triggered:  None  Weight Graph Stoplight Status for Dietary Adherence:  Green Light     Goals         Grocery shop for meal plan foods (pt-stated)                 Walk 1 mile on treadmill twice a week (pt-stated)              Additional Notes:    Patient admits to not planning ahead for the weekend and this has been her biggest challenge so far. We reviewed ways to plan ahead and stay prepared with snacks. She found Healthy Choice options that she enjoys. Has noticed that she is feeling better when working out - has more energy and feels more productive. Also reading nutrition labels for serving sizes more frequently. Has several meal plan snacks she enjoys and has started drinking sips of a Naked smoothie when craving a dessert rather than having chocolate.    Goals:  1) Continue going to the gym 3 days a week  2) Continue following meal plan    Giacomo Greer, MS, RD, LDN  [facility]   [phone]

## 2023-03-30 ENCOUNTER — OFFICE VISIT (OUTPATIENT)
Dept: OPHTHALMOLOGY | Facility: CLINIC | Age: 48
End: 2023-03-30
Payer: COMMERCIAL

## 2023-03-30 DIAGNOSIS — E11.36 DIABETIC CATARACT: ICD-10-CM

## 2023-03-30 DIAGNOSIS — H52.4 MYOPIA WITH ASTIGMATISM AND PRESBYOPIA, BILATERAL: ICD-10-CM

## 2023-03-30 DIAGNOSIS — H52.13 MYOPIA WITH ASTIGMATISM AND PRESBYOPIA, BILATERAL: ICD-10-CM

## 2023-03-30 DIAGNOSIS — H52.203 MYOPIA WITH ASTIGMATISM AND PRESBYOPIA, BILATERAL: ICD-10-CM

## 2023-03-30 DIAGNOSIS — E11.9 DIABETES MELLITUS WITHOUT COMPLICATION: Primary | ICD-10-CM

## 2023-03-30 PROCEDURE — 92004 PR EYE EXAM, NEW PATIENT,COMPREHESV: ICD-10-PCS | Mod: S$GLB,,, | Performed by: OPTOMETRIST

## 2023-03-30 PROCEDURE — 92015 PR REFRACTION: ICD-10-PCS | Mod: S$GLB,,, | Performed by: OPTOMETRIST

## 2023-03-30 PROCEDURE — 3066F PR DOCUMENTATION OF TREATMENT FOR NEPHROPATHY: ICD-10-PCS | Mod: CPTII,S$GLB,, | Performed by: OPTOMETRIST

## 2023-03-30 PROCEDURE — 3052F PR MOST RECENT HEMOGLOBIN A1C LEVEL 8.0 - < 9.0%: ICD-10-PCS | Mod: CPTII,S$GLB,, | Performed by: OPTOMETRIST

## 2023-03-30 PROCEDURE — 4010F ACE/ARB THERAPY RXD/TAKEN: CPT | Mod: CPTII,S$GLB,, | Performed by: OPTOMETRIST

## 2023-03-30 PROCEDURE — 3061F PR NEG MICROALBUMINURIA RESULT DOCUMENTED/REVIEW: ICD-10-PCS | Mod: CPTII,S$GLB,, | Performed by: OPTOMETRIST

## 2023-03-30 PROCEDURE — 4010F PR ACE/ARB THEARPY RXD/TAKEN: ICD-10-PCS | Mod: CPTII,S$GLB,, | Performed by: OPTOMETRIST

## 2023-03-30 PROCEDURE — 1160F RVW MEDS BY RX/DR IN RCRD: CPT | Mod: CPTII,S$GLB,, | Performed by: OPTOMETRIST

## 2023-03-30 PROCEDURE — 92004 COMPRE OPH EXAM NEW PT 1/>: CPT | Mod: S$GLB,,, | Performed by: OPTOMETRIST

## 2023-03-30 PROCEDURE — 1160F PR REVIEW ALL MEDS BY PRESCRIBER/CLIN PHARMACIST DOCUMENTED: ICD-10-PCS | Mod: CPTII,S$GLB,, | Performed by: OPTOMETRIST

## 2023-03-30 PROCEDURE — 1159F PR MEDICATION LIST DOCUMENTED IN MEDICAL RECORD: ICD-10-PCS | Mod: CPTII,S$GLB,, | Performed by: OPTOMETRIST

## 2023-03-30 PROCEDURE — 2023F PR DILATED RETINAL EXAM W/O EVID OF RETINOPATHY: ICD-10-PCS | Mod: CPTII,S$GLB,, | Performed by: OPTOMETRIST

## 2023-03-30 PROCEDURE — 3066F NEPHROPATHY DOC TX: CPT | Mod: CPTII,S$GLB,, | Performed by: OPTOMETRIST

## 2023-03-30 PROCEDURE — 1159F MED LIST DOCD IN RCRD: CPT | Mod: CPTII,S$GLB,, | Performed by: OPTOMETRIST

## 2023-03-30 PROCEDURE — 3052F HG A1C>EQUAL 8.0%<EQUAL 9.0%: CPT | Mod: CPTII,S$GLB,, | Performed by: OPTOMETRIST

## 2023-03-30 PROCEDURE — 92015 DETERMINE REFRACTIVE STATE: CPT | Mod: S$GLB,,, | Performed by: OPTOMETRIST

## 2023-03-30 PROCEDURE — 2023F DILAT RTA XM W/O RTNOPTHY: CPT | Mod: CPTII,S$GLB,, | Performed by: OPTOMETRIST

## 2023-03-30 PROCEDURE — 99999 PR PBB SHADOW E&M-EST. PATIENT-LVL II: CPT | Mod: PBBFAC,,, | Performed by: OPTOMETRIST

## 2023-03-30 PROCEDURE — 99999 PR PBB SHADOW E&M-EST. PATIENT-LVL II: ICD-10-PCS | Mod: PBBFAC,,, | Performed by: OPTOMETRIST

## 2023-03-30 PROCEDURE — 3061F NEG MICROALBUMINURIA REV: CPT | Mod: CPTII,S$GLB,, | Performed by: OPTOMETRIST

## 2023-03-30 NOTE — PROGRESS NOTES
HPI     Annual Exam            Comments: Patient reports for diabetic eye exam           Diabetic Eye Exam            Comments: Lab Results       Component                Value               Date                       HGBA1C                   8.0 (H)             02/21/2023                     Comments    Diabetic eye exam  Diagnosed with diabetes in 2019  Recent vision fluctuations None  PCP Dr. Elkins   Last A1C 8.0  Vision changes since last eye exam?: None noticed      Any eye pain today: None    Other ocular symptoms: None    Interested in contact lens fitting today? No             Last edited by Sabine Rae MA on 3/30/2023  1:16 PM.            Assessment /Plan     For exam results, see Encounter Report.    Diabetes mellitus without complication  4 years, last A1c 8.0 Stressed importance of DM control to preserve vision. No diabetic retinopathy was seen in either eye today. Continue strict blood glucose control.  Reviewed importance of yearly dilated eye exams. Continue close care with Dr. Elkins regarding diabetes.     Diabetic cataract  Cataracts are not visually significant and not affecting activities of daily living. Annual observation is recommended at this time. Patient to call or RTC with any significant change in vision prior to next visit.     Myopia with astigmatism and presbyopia, bilateral  Eyeglass Final Rx       Eyeglass Final Rx         Sphere Cylinder Axis Add    Right -1.00 +2.50 071 +1.50    Left -1.00 +1.50 103 +1.50      Type: PAL    Expiration Date: 3/30/2024                     RTC 1 yr for dilated eye exam or sooner if any changes to vision.   Discussed above and answered questions.

## 2023-04-05 ENCOUNTER — PATIENT OUTREACH (OUTPATIENT)
Dept: RESEARCH | Facility: CLINIC | Age: 48
End: 2023-04-05
Payer: COMMERCIAL

## 2023-04-12 ENCOUNTER — PATIENT OUTREACH (OUTPATIENT)
Dept: RESEARCH | Facility: CLINIC | Age: 48
End: 2023-04-12
Payer: COMMERCIAL

## 2023-04-12 ENCOUNTER — PATIENT MESSAGE (OUTPATIENT)
Dept: FAMILY MEDICINE | Facility: CLINIC | Age: 48
End: 2023-04-12
Payer: COMMERCIAL

## 2023-04-12 ENCOUNTER — TELEPHONE (OUTPATIENT)
Dept: FAMILY MEDICINE | Facility: CLINIC | Age: 48
End: 2023-04-12

## 2023-04-12 NOTE — TELEPHONE ENCOUNTER
Just wanted to see if [place] can point me in the right direction on  diabetic supplies at low or no cost       Sophia Yeung Staff (supporting Barbi Elkins MD) 2 weeks ago       This is not urgent or anything like that... but i was wondering if you might know if there is a place to get free diabetic supplies... also what is the requirements for the meters that give readings on the phone... thanks in advance for the information.       Per portal

## 2023-04-12 NOTE — PROGRESS NOTES
04/12/2023  1:55 PM    Patient Name Sophia Cesar   Appointment Department [facility] PROP WEIGHT MANAGEMENT  Visit Type Audio (Virtual visit)    Clinic Weights   Wt Readings from Last 3 Encounters:   02/27/23 1621 91.5 kg (201 lb 11.5 oz)   05/31/22 0728 87.8 kg (193 lb 9 oz)   05/26/22 0749 86.7 kg (191 lb 2.2 oz)     Last Reported Weights No data was found      [facility] INTERVENTION CONTACT:   Method of contact:  Phone Call   or Participant-Initiated Contact?:  -initiated contact  Type of intervention Contact:  Scheduled Session  Did the participant attend session?: Yes    Was the patient called within 15 minutes of the scheduled appointment?:  Yes  Is this a make-up session?: No    Which session materials covered in session?:  Session 4 and Session 5  Patient Reported Weight (From External Jadiel):  190.5  Patient-reported weekly minutes of physical activity::  135  Average Daily Steps: none reported.  Calorie Prescription::  1900  Safety Criteria Triggered:  None  Toolbox Triggered:  None  Weight Graph Stoplight Status for Dietary Adherence:  Green Light     Goals         Grocery shop for meal plan foods (pt-stated)                 Walk 1 mile on treadmill twice a week (pt-stated)              Additional Notes:    Patient has been struggling with constipation - was able to have BM yesterday but went several days without one. Does report increasing fruit and veggie intake since starting the program. Encouraged patient to continue drinking plenty of water and we will monitor this on our weekly calls.    Reports that since starting the program, she finds the taste of sugar sweetened beverages to be too sweet. She was drinking these regularly before the program. Also notes that her fasting blood sugar in the morning has been more controlled.    Goals  1) Drink more water   2) Continue with going to the gym 3 days a week    Giacomo Greer, MS, RD, LDN  [facility]   [phone]

## 2023-04-19 ENCOUNTER — TELEPHONE (OUTPATIENT)
Dept: RESEARCH | Facility: CLINIC | Age: 48
End: 2023-04-19

## 2023-04-19 ENCOUNTER — PATIENT MESSAGE (OUTPATIENT)
Dept: ADMINISTRATIVE | Facility: HOSPITAL | Age: 48
End: 2023-04-19
Payer: COMMERCIAL

## 2023-04-21 NOTE — TELEPHONE ENCOUNTER
CRC left a message on behalf of Health  Giacomo. Pt's appointment is cancelled due to Giacomo's connectivity issues.Giacomo will contact Pt to reschedule.

## 2023-04-26 ENCOUNTER — PATIENT OUTREACH (OUTPATIENT)
Dept: RESEARCH | Facility: CLINIC | Age: 48
End: 2023-04-26
Payer: COMMERCIAL

## 2023-04-26 NOTE — PROGRESS NOTES
04/26/2023  2:01 PM    Patient Name Sophia Cesar   Appointment Department [facility] PROP WEIGHT MANAGEMENT  Visit Type Audio (Virtual visit)    Clinic Weights   Wt Readings from Last 3 Encounters:   02/27/23 1621 91.5 kg (201 lb 11.5 oz)   05/31/22 0728 87.8 kg (193 lb 9 oz)   05/26/22 0749 86.7 kg (191 lb 2.2 oz)     Last Reported Weights No data was found      [facility] INTERVENTION CONTACT:   Method of contact:  Phone Call   or Participant-Initiated Contact?:  -initiated contact  Type of intervention Contact:  Scheduled Session  Did the participant attend session?: Yes    Was the patient called within 15 minutes of the scheduled appointment?:  Yes  Is this a make-up session?: No    Which session materials covered in session?:  Session 6 and Session 7  Patient Reported Weight (From External Jadiel):  190.3  Patient-reported weekly minutes of physical activity::  135  Average Daily Steps: none reported.  Calorie Prescription::  1900  Safety Criteria Triggered:  None  Toolbox Triggered:  None  Weight Graph Stoplight Status for Dietary Adherence:  Yellow Light - In the Zone     Goals         Grocery shop for meal plan foods (pt-stated)                 Walk 1 mile on treadmill twice a week (pt-stated)              Additional Notes:    Patient reports Doing Well. Patient is Highly Motivated with being 190.3 pounds today and aims to Get Below that weight next week. Patient's plans for eating this week include Packing healthy snacks and Other incorporating more vegetables, and watching sodium content . For exercise, patient plans to start using the pedometer and make it a priority to move on the weekend.     Giacomo Greer, MS, RD, LDN  [employer]   [phone]

## 2023-04-28 ENCOUNTER — PATIENT MESSAGE (OUTPATIENT)
Dept: FAMILY MEDICINE | Facility: CLINIC | Age: 48
End: 2023-04-28
Payer: COMMERCIAL

## 2023-05-02 ENCOUNTER — HOSPITAL ENCOUNTER (OUTPATIENT)
Dept: PREADMISSION TESTING | Facility: HOSPITAL | Age: 48
Discharge: HOME | End: 2023-05-02
Attending: INTERNAL MEDICINE
Payer: COMMERCIAL

## 2023-05-02 DIAGNOSIS — Z12.11 COLON CANCER SCREENING: Primary | ICD-10-CM

## 2023-05-02 RX ORDER — SODIUM, POTASSIUM,MAG SULFATES 17.5-3.13G
1 SOLUTION, RECONSTITUTED, ORAL ORAL DAILY
Qty: 1 KIT | Refills: 0 | Status: SHIPPED | OUTPATIENT
Start: 2023-05-02 | End: 2023-05-04

## 2023-05-09 ENCOUNTER — PATIENT MESSAGE (OUTPATIENT)
Dept: RESEARCH | Facility: CLINIC | Age: 48
End: 2023-05-09
Payer: COMMERCIAL

## 2023-05-10 ENCOUNTER — PATIENT OUTREACH (OUTPATIENT)
Dept: RESEARCH | Facility: CLINIC | Age: 48
End: 2023-05-10
Payer: COMMERCIAL

## 2023-05-10 ENCOUNTER — TELEPHONE (OUTPATIENT)
Dept: FAMILY MEDICINE | Facility: CLINIC | Age: 48
End: 2023-05-10
Payer: COMMERCIAL

## 2023-05-10 ENCOUNTER — PATIENT MESSAGE (OUTPATIENT)
Dept: FAMILY MEDICINE | Facility: CLINIC | Age: 48
End: 2023-05-10
Payer: COMMERCIAL

## 2023-05-10 DIAGNOSIS — E11.9 CONTROLLED TYPE 2 DIABETES MELLITUS WITHOUT COMPLICATION, WITHOUT LONG-TERM CURRENT USE OF INSULIN: Primary | ICD-10-CM

## 2023-05-10 NOTE — TELEPHONE ENCOUNTER
"Pt portal message    "Good afternoon,  I never received a response to the approval of the blood sugar meter from the message on 04/28/2023.     Also I have an appt on the 25 at 4... its an appt about my blood work and I wanted to see if i could do a virtual appointment. I overbooked on appointments on this day and i have a dentist appointment at 5 that i must be on time for.  Please let me know if this is possible.   thanks in advance for your help "    Please advise    "

## 2023-05-10 NOTE — PROGRESS NOTES
05/10/2023  1:57 PM    Patient Name Sophia Cesar   Appointment Department [facility] NETTA WEIGHT MANAGEMENT  Visit Type Audio (Virtual visit)    Clinic Weights   Wt Readings from Last 3 Encounters:   02/27/23 1621 91.5 kg (201 lb 11.5 oz)   05/31/22 0728 87.8 kg (193 lb 9 oz)   05/26/22 0749 86.7 kg (191 lb 2.2 oz)     Last Reported Weights No data was found      [facility] INTERVENTION CONTACT:   Method of contact:  Phone Call   or Participant-Initiated Contact?:  -initiated contact  Type of intervention Contact:  Scheduled Session  Did the participant attend session?: Yes    Was the patient called within 15 minutes of the scheduled appointment?:  Yes  Is this a make-up session?: No    Which session materials covered in session?:  Session 8 and Session 9  Patient Reported Weight (From External Jadiel):  192.5  Patient-reported weekly minutes of physical activity::  0  Average Daily Steps: none reported.  Calorie Prescription::  1900  Safety Criteria Triggered:  None  Toolbox Triggered:  Adherence to diet  Adherence to diet: Health  must choose at least two interventions from list::  Modify eating behavior and meal patterns, Direct dietary modification based on individual needs and Use of motivational interviewing  Weight Graph Stoplight Status for Dietary Adherence:  Red Light     Goals         Grocery shop for meal plan foods (pt-stated)                 Walk 1 mile on treadmill twice a week (pt-stated)              Additional Notes:    Patient reports Doing Well. Patient is Poorly Motivated with being 192.5 pounds today and aims to Get Below that weight next week. Patient's plans for eating this week include Buying pre-packaged meals, Packing healthy snacks, and Other having simple meals readily available in the house . For exercise, patient plans to go to the gym 3 times this week. She feels motivated to create a consistent routine with exercise and her eating habits.      Giacomo Greer, MS, RD,  [facility]  [facility]   [phone]

## 2023-05-10 NOTE — TELEPHONE ENCOUNTER
Ok to change appt type to virtual. Can she come for A1c a few days earlier (order is in). Sorry, for delay for diabetes supplies.    I have put the following orders and/or medications to this note.  Please advise pt and assist.    Orders Placed This Encounter   Procedures    Hemoglobin A1C     Standing Status:   Future     Standing Expiration Date:   7/8/2024       Medications Ordered This Encounter   Medications    flash glucose scanning reader (FREESTYLE STEPHANIE 10 DAY READER) Misc     Sig: Use daily as directed - Dx: E11.9     Dispense:  1 each     Refill:  0    flash glucose sensor (FREESTYLE STEPHANIE 10 DAY SENSOR) Kit     Sig: Use for up to 10 days as directed - Dx: E11.9     Dispense:  3 kit     Refill:  11

## 2023-05-17 ENCOUNTER — PATIENT OUTREACH (OUTPATIENT)
Dept: RESEARCH | Facility: CLINIC | Age: 48
End: 2023-05-17
Payer: COMMERCIAL

## 2023-05-17 LAB — HBA1C MFR BLD: 6.2 % (ref 4.8–5.6)

## 2023-05-17 NOTE — PROGRESS NOTES
05/17/2023  2:00 PM    Patient Name Sophia Cesar   Appointment Department [facility]TA WEIGHT MANAGEMENT  Visit Type Audio (Virtual visit)    Clinic Weights   Wt Readings from Last 3 Encounters:   02/27/23 1621 91.5 kg (201 lb 11.5 oz)   05/31/22 0728 87.8 kg (193 lb 9 oz)   05/26/22 0749 86.7 kg (191 lb 2.2 oz)     Last Reported Weights No data was found      [facility] INTERVENTION CONTACT:   Method of contact:  Phone Call   or Participant-Initiated Contact?:  -initiated contact  Type of intervention Contact:  Scheduled Session  Did the participant attend session?: Yes    Was the patient called within 15 minutes of the scheduled appointment?:  Yes  Is this a make-up session?: No    Which session materials covered in session?:  Session 10  Patient Reported Weight (From External Jadiel):  193.8  Patient-reported weekly minutes of physical activity::  30  Average Daily Steps: none reported.  Calorie Prescription::  1900  Safety Criteria Triggered:  None  Toolbox Triggered:  Adherence to diet  Adherence to diet: Health  must choose at least two interventions from list::  Use of motivational interviewing and Participant to consume only meal replacements and portion-controlled foods as prescribed  Weight Graph Stoplight Status for Dietary Adherence:  Red Light     Goals         Grocery shop for meal plan foods (pt-stated)                 Walk 1 mile on treadmill twice a week (pt-stated)              Additional Notes:    Patient reports Doing Well. Patient is Not Motivated with being 193.8 pounds today and aims to Get Below that weight next week. She hasn't been grocery shopping for a few weeks and has been eating most meals at her mom's. Patient's plans for eating this week include Buying pre-packaged meals, Packing healthy snacks, and Declining unhealthy options. She plans to go grocery shopping on Friday and use the resources from the first session. For exercise, patient plans to get to the gym 2-3 times  this week for 30 minutes each. She did report that her A1c went down from 8.0 to 6.2! Congratulated ppt on this huge accomplishment and encouraged her to continue making healthy choices.        Giacomo Greer MS, RD, LDN  [facility]   [phone]

## 2023-05-22 ENCOUNTER — PATIENT MESSAGE (OUTPATIENT)
Dept: ENDOSCOPY | Facility: HOSPITAL | Age: 48
End: 2023-05-22
Payer: COMMERCIAL

## 2023-05-23 ENCOUNTER — ANESTHESIA EVENT (OUTPATIENT)
Dept: ENDOSCOPY | Facility: HOSPITAL | Age: 48
End: 2023-05-23
Payer: COMMERCIAL

## 2023-05-23 NOTE — ANESTHESIA PREPROCEDURE EVALUATION
05/23/2023  Sophia Cesar is a 47 y.o., female.  Past Medical History:   Diagnosis Date    Anemia during pregnancy     Diabetes mellitus, type 2     HSV-1 infection     HSV-2 infection     Obesity     Pre-diabetes 04/04/2016    Premature surgical menopause     No history of abnormal pap smear    Vitamin D deficiency 04/04/2016     Past Surgical History:   Procedure Laterality Date    HYSTERECTOMY      PARTIAL HYSTERECTOMY  2006    Due to DUB after delivery     Current Outpatient Medications   Medication Instructions    acyclovir (ZOVIRAX) 400 MG tablet TAKE 1 TABLET(400 MG) BY MOUTH TWO TIMES DAILY    flash glucose scanning reader (FREESTYLE STEPHANIE 10 DAY READER) Misc Use daily as directed - Dx: E11.9    flash glucose sensor (FREESTYLE STEPHANIE 10 DAY SENSOR) Kit Use for up to 10 days as directed - Dx: E11.9    lisinopriL (PRINIVIL,ZESTRIL) 5 mg, Oral, Daily    metFORMIN (GLUCOPHAGE) 500 mg, Oral, With breakfast    OZEMPIC 0.25 mg, Subcutaneous, Every 7 days    pravastatin (PRAVACHOL) 10 mg, Oral, Nightly             Pre-op Assessment    I have reviewed the Patient Summary Reports.     I have reviewed the Nursing Notes. I have reviewed the NPO Status.   I have reviewed the Medications.     Review of Systems  Anesthesia Hx:  No problems with previous Anesthesia  History of prior surgery of interest to airway management or planning: Previous anesthesia: General Denies Family Hx of Anesthesia complications.   Denies Personal Hx of Anesthesia complications.   Social:  Non-Smoker, Social Alcohol Use    Hematology/Oncology:         -- Anemia:   Endocrine:   Diabetes, type 2  Obesity / BMI > 30  Psych:   Psychiatric History          Physical Exam  General: Well nourished, Alert and Oriented    Airway:  Mallampati: II / II  Mouth Opening: Normal  TM Distance: Normal  Tongue: Normal  Neck ROM: Normal  ROM    Dental:  Intact    Chest/Lungs:  Clear to auscultation, Normal Respiratory Rate    Heart:  Rate: Normal  Rhythm: Regular Rhythm        Anesthesia Plan  Type of Anesthesia, risks & benefits discussed:    Anesthesia Type: Gen Natural Airway  Intra-op Monitoring Plan: Standard ASA Monitors  Post Op Pain Control Plan: multimodal analgesia  Induction:  IV  Informed Consent: Informed consent signed with the Patient and all parties understand the risks and agree with anesthesia plan.  All questions answered.   ASA Score: 2  Day of Surgery Review of History & Physical: H&P Update referred to the surgeon/provider.    Ready For Surgery From Anesthesia Perspective.     .

## 2023-05-24 ENCOUNTER — PATIENT OUTREACH (OUTPATIENT)
Dept: RESEARCH | Facility: CLINIC | Age: 48
End: 2023-05-24
Payer: COMMERCIAL

## 2023-05-24 NOTE — PROGRESS NOTES
04/05/2023  3:11 PM    Patient Name Sophia Cesar   Appointment Department [facility] PROP WEIGHT MANAGEMENT  Visit Type Audio (Virtual visit)    Clinic Weights   Wt Readings from Last 3 Encounters:   02/27/23 1621 91.5 kg (201 lb 11.5 oz)   05/31/22 0728 87.8 kg (193 lb 9 oz)   05/26/22 0749 86.7 kg (191 lb 2.2 oz)     Last Reported Weights No data was found      [facility] INTERVENTION CONTACT:   Method of contact:  Phone Call   or Participant-Initiated Contact?:  -initiated contact  Type of intervention Contact:  Scheduled Session  Did the participant attend session?: No    If no, please select a reason::  Other (please comment)  Safety Criteria Triggered:  None  Weight Graph Stoplight Status for Dietary Adherence:  Yellow Light - In the Zone     Goals         Grocery shop for meal plan foods (pt-stated)                 Walk 1 mile on treadmill twice a week (pt-stated)              Additional Notes:    Called ppt at scheduled session time with no success, left voicemail. Unable to reach before next scheduled session.    Giacomo Greer, MS, RD, LDN  [facility]   [phone]

## 2023-05-24 NOTE — PROGRESS NOTES
05/24/2023  2:03 PM    Patient Name Sophia Cesar   Appointment Department [facility]TA WEIGHT MANAGEMENT  Visit Type Audio (Virtual visit)    Clinic Weights   Wt Readings from Last 3 Encounters:   02/27/23 1621 91.5 kg (201 lb 11.5 oz)   05/31/22 0728 87.8 kg (193 lb 9 oz)   05/26/22 0749 86.7 kg (191 lb 2.2 oz)     Last Reported Weights No data was found      [facility] INTERVENTION CONTACT:   Method of contact:  Phone Call   or Participant-Initiated Contact?:  -initiated contact  Type of intervention Contact:  Scheduled Session  Did the participant attend session?: Yes    Was the patient called within 15 minutes of the scheduled appointment?:  Yes  Is this a make-up session?: No    Which session materials covered in session?:  Session 11  Patient Reported Weight (From External Jadiel):  191.4  Patient-reported weekly minutes of physical activity::  60  Average Daily Steps: none reported.  Calorie Prescription::  1900  Safety Criteria Triggered:  None  Toolbox Triggered:  Adherence to diet  Adherence to diet: Health  must choose at least two interventions from list::  Reduce portion size, Sole use of a structured meal plan checklist given to ppt in their first intervention session and Use of motivational interviewing  Weight Graph Stoplight Status for Dietary Adherence:  Red Light     Goals         Grocery shop for meal plan foods (pt-stated)                 Walk 1 mile on treadmill twice a week (pt-stated)              Additional Notes:    Patient reports Doing Well. Patient is Poorly Motivated with being 191.4 pounds today and aims to Get Below that weight next week. Reports that she feels great, has been drinking plenty of water, and making healthy food choices. Tried air fried green beans and loved it! We reviewed the importance of portion sizes. Her fast food consumption has drastically decreased since starting this study. Patient's plans for eating this week include Buying pre-packaged meals,  Packing healthy snacks, and Declining unhealthy options. For exercise, patient plans to continue going to the gym at least twice a week. She hopes in the future to go every other day. Also note that she is having a colonoscopy on 5/26.         Giacomo Greer MS, RD, LDN  [facility]   [phone]

## 2023-05-25 ENCOUNTER — OFFICE VISIT (OUTPATIENT)
Dept: FAMILY MEDICINE | Facility: CLINIC | Age: 48
End: 2023-05-25
Payer: COMMERCIAL

## 2023-05-25 DIAGNOSIS — E11.9 CONTROLLED TYPE 2 DIABETES MELLITUS WITHOUT COMPLICATION, WITHOUT LONG-TERM CURRENT USE OF INSULIN: Primary | ICD-10-CM

## 2023-05-25 PROCEDURE — 3061F NEG MICROALBUMINURIA REV: CPT | Mod: CPTII,95,, | Performed by: FAMILY MEDICINE

## 2023-05-25 PROCEDURE — 3066F PR DOCUMENTATION OF TREATMENT FOR NEPHROPATHY: ICD-10-PCS | Mod: CPTII,95,, | Performed by: FAMILY MEDICINE

## 2023-05-25 PROCEDURE — 3044F PR MOST RECENT HEMOGLOBIN A1C LEVEL <7.0%: ICD-10-PCS | Mod: CPTII,95,, | Performed by: FAMILY MEDICINE

## 2023-05-25 PROCEDURE — 3066F NEPHROPATHY DOC TX: CPT | Mod: CPTII,95,, | Performed by: FAMILY MEDICINE

## 2023-05-25 PROCEDURE — 4010F PR ACE/ARB THEARPY RXD/TAKEN: ICD-10-PCS | Mod: CPTII,95,, | Performed by: FAMILY MEDICINE

## 2023-05-25 PROCEDURE — 99213 OFFICE O/P EST LOW 20 MIN: CPT | Mod: 95,,, | Performed by: FAMILY MEDICINE

## 2023-05-25 PROCEDURE — 3061F PR NEG MICROALBUMINURIA RESULT DOCUMENTED/REVIEW: ICD-10-PCS | Mod: CPTII,95,, | Performed by: FAMILY MEDICINE

## 2023-05-25 PROCEDURE — 4010F ACE/ARB THERAPY RXD/TAKEN: CPT | Mod: CPTII,95,, | Performed by: FAMILY MEDICINE

## 2023-05-25 PROCEDURE — 3044F HG A1C LEVEL LT 7.0%: CPT | Mod: CPTII,95,, | Performed by: FAMILY MEDICINE

## 2023-05-25 PROCEDURE — 99213 PR OFFICE/OUTPT VISIT, EST, LEVL III, 20-29 MIN: ICD-10-PCS | Mod: 95,,, | Performed by: FAMILY MEDICINE

## 2023-05-25 NOTE — PROGRESS NOTES
Sophia SHARMA Arsenio    Chief Complaint   Patient presents with    Diabetes    Follow-up       History of Present Illness:   The patient location is: Home (In [place])  The chief complaint leading to consultation is: Diabetes follow up    Visit type: audiovisual    Face to Face time with patient: 20 minutes  of total time spent on the encounter, which includes face to face time and non-face to face time preparing to see the patient (eg, review of tests), Obtaining and/or reviewing separately obtained history, Documenting clinical information in the electronic or other health record, Independently interpreting results (not separately reported) and communicating results to the patient/family/caregiver, or Care coordination (not separately reported).         Each patient to whom he or she provides medical services by telemedicine is:  (1) informed of the relationship between the physician and patient and the respective role of any other health care provider with respect to management of the patient; and (2) notified that he or she may decline to receive medical services by telemedicine and may withdraw from such care at any time.      Notes:   Ms. Cesar comes in today for diabetes follow up.  She continues Metformin 500 mg daily and with added Ozempic 0.25 mg weekly as of February 2023 without problems. She states she checks glucose checks daily (fasting) with levels ranging . She monitors diet (tries). She exercises at gym 2 times per week, 45 to 60 minutes. She follows with [facility] weight study with weekly virtual visit with daily weights. 191 lb today per patient.  She reports no acute problems. She denies having fever, chills, fatigue, appetite changes; shortness of breath, cough, wheezing; chest pain, palpitations, leg swelling; abdominal pain, nausea, vomiting, diarrhea, constipation; unusual urinary symptoms; polydipsia, polyphagia, polyuria, hot or cold intolerance; back pain; acute visual changes, numbness,  headache; anxiety, depression, homicidal or suicidal thoughts.             Labs:                 WBC                      5.7                 02/21/2023                 HGB                      12.3                02/21/2023                 HCT                      36.5                02/21/2023                 PLT                      336                 02/21/2023                 CHOL                     170                 02/21/2023                 TRIG                     49                  02/21/2023                 HDL                      43                  02/21/2023                 ALT                      12                  02/21/2023                 AST                      12                  02/21/2023                 NA                       135                 02/21/2023                 K                        4.0                 02/21/2023                 CL                       100                 02/21/2023                 CREATININE               0.71                02/21/2023                 BUN                      8                   02/21/2023                 CO2                      25                  02/21/2023                 TSH                      3.270               02/21/2023                 HGBA1C                   6.2 (H)             05/16/2023                 MICROALBUR               <3.0                02/21/2023           LDLCALC                  117 (H)             02/21/2023           Hemoglobin A1c            8.0                  02/21/2023    Diabetes  She has type 2 diabetes mellitus. No MedicAlert identification noted. The initial diagnosis of diabetes was made 2017 years ago. Pertinent negatives for hypoglycemia include no confusion, dizziness, headaches, hunger, mood changes, nervousness/anxiousness, seizures, sleepiness, speech difficulty, sweats or tremors. Pertinent negatives for diabetes include no blurred vision, no chest pain, no fatigue, no foot paresthesias,  no foot ulcerations, no polydipsia, no polyphagia, no polyuria, no visual change, no weakness and no weight loss. Pertinent negatives for hypoglycemia complications include no blackouts, no hospitalization, no nocturnal hypoglycemia, no required assistance and no required glucagon injection. Pertinent negatives for diabetic complications include no autonomic neuropathy, CVA, heart disease, nephropathy, peripheral neuropathy, PVD or retinopathy. There are no known risk factors for coronary artery disease. Current diabetic treatment includes oral agent (dual therapy). She is compliant with treatment most of the time. Insulin injections are given by patient. Rotation sites for injection include the abdominal wall. She has not had a previous visit with a dietitian. She monitors blood glucose at home 1-2 x per day. She monitors urine at home <1 x per month. Blood glucose monitoring compliance is good. Her home blood glucose trend is decreasing steadily. She does not see a podiatrist.Eye exam is current.     Current Outpatient Medications   Medication Sig    acyclovir (ZOVIRAX) 400 MG tablet TAKE 1 TABLET(400 MG) BY MOUTH TWO TIMES DAILY    flash glucose scanning reader (FREESTYLE STEPHANIE 10 DAY READER) Misc Use daily as directed - Dx: E11.9    flash glucose sensor (FREESTYLE STEPHANIE 10 DAY SENSOR) Kit Use for up to 10 days as directed - Dx: E11.9    lisinopriL (PRINIVIL,ZESTRIL) 5 MG tablet Take 1 tablet (5 mg total) by mouth once daily.    metFORMIN (GLUCOPHAGE) 500 MG tablet Take 1 tablet (500 mg total) by mouth daily with breakfast.    pravastatin (PRAVACHOL) 10 MG tablet Take 1 tablet (10 mg total) by mouth every evening.    semaglutide (OZEMPIC) 0.25 mg or 0.5 mg(2 mg/1.5 mL) pen injector Inject 0.25 mg into the skin every 7 days.         Review of Systems   Constitutional:  Positive for activity change. Negative for appetite change, chills, fatigue, fever and weight loss.   Eyes:  Negative for blurred vision and visual  disturbance.   Respiratory:  Negative for cough and shortness of breath.    Cardiovascular:  Negative for chest pain, palpitations and leg swelling.   Gastrointestinal:  Negative for abdominal pain, constipation, diarrhea, nausea and vomiting.   Endocrine: Negative for cold intolerance, heat intolerance, polydipsia, polyphagia and polyuria.        See history of present illness.   Genitourinary:  Negative for difficulty urinating.   Musculoskeletal:  Negative for back pain.   Neurological:  Negative for dizziness, tremors, seizures, speech difficulty, weakness and headaches.   Psychiatric/Behavioral:  Negative for confusion and suicidal ideas. The patient is not nervous/anxious.      Objective:  Physical Exam  Vitals reviewed.   Constitutional:       General: She is not in acute distress.     Appearance: Normal appearance. She is obese. She is not ill-appearing, toxic-appearing or diaphoretic.      Comments: Pleasant.   Pulmonary:      Effort: Pulmonary effort is normal. No respiratory distress.   Musculoskeletal:         General: Normal range of motion.      Comments: She sits in chair at home during virtual visit with me.   Neurological:      General: No focal deficit present.      Mental Status: She is alert and oriented to person, place, and time.   Psychiatric:         Mood and Affect: Mood normal.         Behavior: Behavior normal.         Thought Content: Thought content normal.         Judgment: Judgment normal.       ASSESSMENT:  1. Controlled type 2 diabetes mellitus without complication, without long-term current use of insulin        PLAN:  Sophia was seen today for diabetes and follow-up.    Diagnoses and all orders for this visit:    Controlled type 2 diabetes mellitus without complication, without long-term current use of insulin      No labs today.  Continue current medications, follow low sodium, low cholesterol, low carb diet, daily walks.  Keep follow up with specialists.  Flu shot this  fall.  Follow up in about 22 weeks (around 10/26/2023) for diabetes follow up.

## 2023-05-26 ENCOUNTER — ANESTHESIA (OUTPATIENT)
Dept: ENDOSCOPY | Facility: HOSPITAL | Age: 48
End: 2023-05-26
Payer: COMMERCIAL

## 2023-05-26 ENCOUNTER — HOSPITAL ENCOUNTER (OUTPATIENT)
Facility: HOSPITAL | Age: 48
Discharge: HOME | End: 2023-05-26
Attending: INTERNAL MEDICINE | Admitting: INTERNAL MEDICINE
Payer: COMMERCIAL

## 2023-05-26 VITALS
BODY MASS INDEX: 34.64 KG/M2 | RESPIRATION RATE: 18 BRPM | WEIGHT: 188.25 LBS | OXYGEN SATURATION: 99 % | HEIGHT: 62 IN | DIASTOLIC BLOOD PRESSURE: 67 MMHG | TEMPERATURE: 98 F | HEART RATE: 72 BPM | SYSTOLIC BLOOD PRESSURE: 114 MMHG

## 2023-05-26 DIAGNOSIS — Z12.11 COLON CANCER SCREENING: ICD-10-CM

## 2023-05-26 LAB — POCT GLUCOSE: 98 MG/DL (ref 70–110)

## 2023-05-26 PROCEDURE — 37000008 HC ANESTHESIA 1ST 15 MINUTES: Performed by: INTERNAL MEDICINE

## 2023-05-26 PROCEDURE — 45380 PR COLONOSCOPY,BIOPSY: ICD-10-PCS | Mod: 33,,, | Performed by: INTERNAL MEDICINE

## 2023-05-26 PROCEDURE — D9220A PRA ANESTHESIA: Mod: 33,CRNA,, | Performed by: NURSE ANESTHETIST, CERTIFIED REGISTERED

## 2023-05-26 PROCEDURE — 63600175 PHARM REV CODE 636 W HCPCS: Performed by: NURSE ANESTHETIST, CERTIFIED REGISTERED

## 2023-05-26 PROCEDURE — D9220A PRA ANESTHESIA: ICD-10-PCS | Mod: 33,CRNA,, | Performed by: NURSE ANESTHETIST, CERTIFIED REGISTERED

## 2023-05-26 PROCEDURE — 88305 TISSUE EXAM BY PATHOLOGIST: CPT | Mod: 26,,, | Performed by: PATHOLOGY

## 2023-05-26 PROCEDURE — 63600175 PHARM REV CODE 636 W HCPCS: Performed by: INTERNAL MEDICINE

## 2023-05-26 PROCEDURE — 45380 COLONOSCOPY AND BIOPSY: CPT | Mod: 33,,, | Performed by: INTERNAL MEDICINE

## 2023-05-26 PROCEDURE — 45380 COLONOSCOPY AND BIOPSY: CPT | Mod: PT | Performed by: INTERNAL MEDICINE

## 2023-05-26 PROCEDURE — 25000003 PHARM REV CODE 250: Performed by: NURSE ANESTHETIST, CERTIFIED REGISTERED

## 2023-05-26 PROCEDURE — 88305 TISSUE EXAM BY PATHOLOGIST: CPT | Performed by: PATHOLOGY

## 2023-05-26 PROCEDURE — D9220A PRA ANESTHESIA: ICD-10-PCS | Mod: 33,ANES,, | Performed by: ANESTHESIOLOGY

## 2023-05-26 PROCEDURE — 27201012 HC FORCEPS, HOT/COLD, DISP: Performed by: INTERNAL MEDICINE

## 2023-05-26 PROCEDURE — D9220A PRA ANESTHESIA: Mod: 33,ANES,, | Performed by: ANESTHESIOLOGY

## 2023-05-26 PROCEDURE — 88305 TISSUE EXAM BY PATHOLOGIST: ICD-10-PCS | Mod: 26,,, | Performed by: PATHOLOGY

## 2023-05-26 RX ORDER — LIDOCAINE HYDROCHLORIDE 20 MG/ML
INJECTION INTRAVENOUS
Status: DISCONTINUED | OUTPATIENT
Start: 2023-05-26 | End: 2023-05-26

## 2023-05-26 RX ORDER — SODIUM CHLORIDE, SODIUM LACTATE, POTASSIUM CHLORIDE, CALCIUM CHLORIDE 600; 310; 30; 20 MG/100ML; MG/100ML; MG/100ML; MG/100ML
INJECTION, SOLUTION INTRAVENOUS CONTINUOUS
Status: DISCONTINUED | OUTPATIENT
Start: 2023-05-26 | End: 2023-05-26 | Stop reason: HOSPADM

## 2023-05-26 RX ORDER — PROPOFOL 10 MG/ML
VIAL (ML) INTRAVENOUS
Status: DISCONTINUED | OUTPATIENT
Start: 2023-05-26 | End: 2023-05-26

## 2023-05-26 RX ADMIN — PROPOFOL 50 MG: 10 INJECTION, EMULSION INTRAVENOUS at 10:05

## 2023-05-26 RX ADMIN — LIDOCAINE HYDROCHLORIDE 20 MG: 20 INJECTION INTRAVENOUS at 10:05

## 2023-05-26 RX ADMIN — SODIUM CHLORIDE, SODIUM LACTATE, POTASSIUM CHLORIDE, AND CALCIUM CHLORIDE: 600; 310; 30; 20 INJECTION, SOLUTION INTRAVENOUS at 09:05

## 2023-05-26 NOTE — [employer] PATIENT INSTRUCTIONS
Discharge Summary/Instructions after an Endoscopic Procedure  Patient Name: Sophia Cesar  Patient MRN: [id number]  Patient YOB: 1975  Friday, May 26, 2023  Joel Abraham MD  Dear patient,  As a result of recent federal legislation (The Federal Cures Act), you may   receive lab or pathology results from your procedure in your [facility]   account before your physician is able to contact you. Your physician or   their representative will relay the results to you with their   recommendations at their soonest availability.  Thank you,  RESTRICTIONS:  During your procedure today, you received medications for sedation.  These   medications may affect your judgment, balance and coordination.  Therefore,   for 24 hours, you have the following restrictions:   - DO NOT drive a car, operate machinery, make legal/financial decisions,   sign important papers or drink alcohol.    ACTIVITY:  Today: no heavy lifting, straining or running due to procedural   sedation/anesthesia.  The following day: return to full activity including work.  DIET:  Eat and drink normally unless instructed otherwise.     TREATMENT FOR COMMON SIDE EFFECTS:  - Mild abdominal pain, nausea, belching, bloating or excessive gas:  rest,   eat lightly and use a heating pad.  - Sore Throat: treat with throat lozenges and/or gargle with warm salt   water.  - Because air was used during the procedure, expelling large amounts of air   from your rectum or belching is normal.  - If a bowel prep was taken, you may not have a bowel movement for 1-3 days.    This is normal.  SYMPTOMS TO WATCH FOR AND REPORT TO YOUR PHYSICIAN:  1. Abdominal pain or bloating, other than gas cramps.  2. Chest pain.  3. Back pain.  4. Signs of infection such as: chills or fever occurring within 24 hours   after the procedure.  5. Rectal bleeding, which would show as bright red, maroon, or black stools.   (A tablespoon of blood from the rectum is not serious, especially  if   hemorrhoids are present.)  6. Vomiting.  7. Weakness or dizziness.  GO DIRECTLY TO THE NEAREST EMERGENCY ROOM IF YOU HAVE ANY OF THE FOLLOWING:      Difficulty breathing              Chills and/or fever over 101 F   Persistent vomiting and/or vomiting blood   Severe abdominal pain   Severe chest pain   Black, tarry stools   Bleeding- more than one tablespoon   Any other symptom or condition that you feel may need urgent attention  Your doctor recommends these additional instructions:  If any biopsies were taken, your doctors clinic will contact you in 1 to 2   weeks with any results.  - Discharge patient to home (via wheelchair).   - Resume previous diet.   - Continue present medications.   - Await pathology results.   - Repeat colonoscopy in 7-10 years for surveillance.   - The findings and recommendations were discussed with the patient.  For questions, problems or results please call your physician Joel Abraham MD at Work:  [phone]  If you have any questions about the above instructions, call the GI   department at [phone] or call the endoscopy unit at [phone]   from 7am until 3 pm.  [facility] [place], EMERGENCY ROOM PHONE NUMBER:   [phone]  IF A COMPLICATION OR EMERGENCY SITUATION ARISES AND YOU ARE UNABLE TO REACH   YOUR PHYSICIAN - GO DIRECTLY TO THE EMERGENCY ROOM.  I have read or have had read to me these discharge instructions for my   procedure and have received a written copy.  I understand these   instructions and will follow-up with my physician if I have any questions.     __________________________________       _____________________________________  Nurse Signature                                          Patient/Designated   Responsible Party Signature  MD Joel Zarate MD  5/26/2023 10:39:30 AM  This report has been verified and signed electronically.  Dear patient,  As a result of recent federal legislation  (The Federal Cures Act), you may   receive lab or pathology results from your procedure in your [facility]   account before your physician is able to contact you. Your physician or   their representative will relay the results to you with their   recommendations at their soonest availability.  Thank you,  [employer]

## 2023-05-26 NOTE — ANESTHESIA POSTPROCEDURE EVALUATION
Anesthesia Post Evaluation    Patient: Sophia Cesar    Procedure(s) Performed: Procedure(s) (LRB):  COLONOSCOPY (N/A)    Final Anesthesia Type: general      Patient location during evaluation: PACU  Patient participation: Yes- Able to Participate  Level of consciousness: awake and alert and oriented  Post-procedure vital signs: reviewed and stable  Pain management: adequate  Airway patency: patent    PONV status at discharge: No PONV  Anesthetic complications: no      Cardiovascular status: blood pressure returned to baseline, stable and hemodynamically stable  Respiratory status: unassisted  Hydration status: euvolemic  Follow-up not needed.          Vitals Value Taken Time   /64 05/26/23 1058   Temp 36.6 °C (97.9 °F) 05/26/23 1041   Pulse 75 05/26/23 1058   Resp 36 05/26/23 1058   SpO2 100 % 05/26/23 1058   Vitals shown include unvalidated device data.      No case tracking events are documented in the log.      Pain/Rubin Score: Rubin Score: 10 (5/26/2023 10:49 AM)

## 2023-05-26 NOTE — H&P
Endoscopy History and Physical    PCP - Barbi Elkins MD  Referring Physician - Barbi Elkins MD  [id number] [place] [address]      ASA - per anesthesia  Mallampati - per anesthesia  History of Anesthesia problems - no  Family history Anesthesia problems -  no   Plan of anesthesia - General    HPI  47 y.o. female    Planned Procedure: Colonoscopy  Diagnosis: screening for colon cancer  Chief Complaint: Same as above    Personnel H/o colon polyps:index  FH of colon cancer:no  Anticoagulation:no      ROS:  Constitutional: No fevers, chills, No weight loss  CV: No chest pain  Pulm: No cough, No shortness of breath  GI: see HPI    Medical History:  has a past medical history of Anemia during pregnancy, Diabetes mellitus, type 2, HSV-1 infection, HSV-2 infection, Obesity, Pre-diabetes (04/04/2016), Premature surgical menopause, and Vitamin D deficiency (04/04/2016).    Surgical History:  has a past surgical history that includes Partial hysterectomy (2006) and Hysterectomy.    Family History: family history includes Breast cancer in her maternal aunt; Cancer in her maternal aunt and maternal grandmother; Crohn's disease in her cousin; Diabetes in her maternal aunt and paternal uncle; Glaucoma in her sister; Hypertension in her maternal aunt, maternal aunt, maternal aunt, maternal aunt, and mother; No Known Problems in her daughter and son; Stroke in her maternal aunt..    Social History:  reports that she quit smoking about 6 months ago. Her smoking use included cigarettes. She has never used smokeless tobacco. She reports current alcohol use. She reports that she does not use drugs.    Review of patient's allergies indicates:  No Known Allergies    Medications:   Medications Prior to Admission   Medication Sig Dispense Refill Last Dose    acyclovir (ZOVIRAX) 400 MG tablet TAKE 1 TABLET(400 MG) BY MOUTH TWO TIMES DAILY 180 tablet 1 5/24/2023    flash glucose scanning reader ([employer] STEPHANIE 10 DAY  READER) Misc Use daily as directed - Dx: E11.9 1 each 0     flash glucose sensor (FREESTYLE STEPHANIE 10 DAY SENSOR) Kit Use for up to 10 days as directed - Dx: E11.9 3 kit 11     lisinopriL (PRINIVIL,ZESTRIL) 5 MG tablet Take 1 tablet (5 mg total) by mouth once daily. 90 tablet 2 5/24/2023    metFORMIN (GLUCOPHAGE) 500 MG tablet Take 1 tablet (500 mg total) by mouth daily with breakfast. 90 tablet 2 5/24/2023    pravastatin (PRAVACHOL) 10 MG tablet Take 1 tablet (10 mg total) by mouth every evening. 90 tablet 2 5/24/2023    semaglutide (OZEMPIC) 0.25 mg or 0.5 mg(2 mg/1.5 mL) pen injector Inject 0.25 mg into the skin every 7 days. 1 pen 5 5/24/2023       Physical Exam:    Vital Signs:   Vitals:    05/26/23 0902   BP: 123/64   Pulse: 78   Resp: 16   Temp: 97.7 °F (36.5 °C)       General Appearance: Well appearing in no acute distress  Abdomen: Soft, non tender, non distended with normal bowel sounds, no masses    Labs:  Lab Results   Component Value Date    WBC 5.7 02/21/2023    HGB 12.3 02/21/2023    HCT 36.5 02/21/2023     02/21/2023    CHOL 170 02/21/2023    TRIG 49 02/21/2023    HDL 43 02/21/2023    ALT 12 02/21/2023    AST 12 02/21/2023     02/21/2023    K 4.0 02/21/2023     02/21/2023    CREATININE 0.71 02/21/2023    BUN 8 02/21/2023    CO2 25 02/21/2023    TSH 3.270 02/21/2023    HGBA1C 6.2 (H) 05/16/2023    MICROALBUR <3.0 02/21/2023       I have explained the risks and benefits of this endoscopic procedure to the patient including but not limited to bleeding, inflammation, infection, perforation, and death.    SEDATION PLAN: per anesthesia       History reviewed, vital signs satisfactory, cardiopulmonary status satisfactory, sedation options, risks and plans have been discussed with the patient  All their questions were answered and the patient agrees to the sedation procedures as planned and the patient is deemed an appropriate candidate for the sedation as planned.     The risks, benefits  and alternatives of the procedure were discussed with the patient in detail. This discussion was had in the presence of endoscopy staff. The risks include, risks of adverse reaction to sedation requiring the use of reversal agents, bleeding requiring blood transfusion, perforation requiring surgical intervention and technical failure. Other risks include aspiration leading to respiratory distress and respiratory failure resulting in endotracheal intubation and mechanical ventilation including death. If anesthesia is being utilized for this procedure, it is up to the anesthesiologist to determine airway safety including elective endotracheal intubation. Questions were answered, they agree to proceed. There was no language barriers.       Procedure explained to patient, informed consent obtained and placed in chart.       Joel Abraham MD

## 2023-05-26 NOTE — TRANSFER OF CARE
"Anesthesia Transfer of Care Note    Patient: Sophia Cesar    Procedure(s) Performed: Procedure(s) (LRB):  COLONOSCOPY (N/A)    Patient location: PACU    Anesthesia Type: epidural    Transport from OR: Transported from OR on room air with adequate spontaneous ventilation    Post pain: adequate analgesia    Post assessment: no apparent anesthetic complications and tolerated procedure well    Post vital signs: stable    Level of consciousness: awake, alert and oriented    Nausea/Vomiting: no nausea/vomiting    Complications: none    Transfer of care protocol was followed      Last vitals:   Visit Vitals  /64   Pulse 78   Temp 36.5 °C (97.7 °F) (Temporal)   Resp 16   Ht 5' 2" (1.575 m)   Wt 85.4 kg (188 lb 4.4 oz)   SpO2 99%   Breastfeeding No   BMI 34.44 kg/m²     "

## 2023-05-26 NOTE — PLAN OF CARE
Discharge instructions reviewed with patient and visitor. Handouts given & verbalized understanding with no further questions at this time. Dr. Abraham spoke to pt at bedside, reviewed procedure and findings, answered questions. Made aware they are awaiting biopsy results with MD telephone number provided per AVS sheet. VSS on RA, no pain or nausea noted, tolerating po fluids, no complaints noted. Fall precautions reviewed, consents in chart, PIV removed at this time.

## 2023-05-26 NOTE — DISCHARGE SUMMARY
[facility] - Endoscopy 1st Fl  Discharge Note  Short Stay    Procedure(s) (LRB):  COLONOSCOPY (N/A)      OUTCOME: Patient tolerated treatment/procedure well without complication and is now ready for discharge.    DISPOSITION: Home or Self Care    FINAL DIAGNOSIS:  Colon cancer screening    FOLLOWUP: With primary care provider    DISCHARGE INSTRUCTIONS:  No discharge procedures on file.     TIME SPENT ON DISCHARGE: 20 minutes

## 2023-05-30 ENCOUNTER — PATIENT MESSAGE (OUTPATIENT)
Dept: RESEARCH | Facility: CLINIC | Age: 48
End: 2023-05-30
Payer: COMMERCIAL

## 2023-05-31 ENCOUNTER — PATIENT OUTREACH (OUTPATIENT)
Dept: RESEARCH | Facility: CLINIC | Age: 48
End: 2023-05-31
Payer: COMMERCIAL

## 2023-05-31 NOTE — PROGRESS NOTES
05/31/2023  1:58 PM    Patient Name Sophia Cesar   Appointment Department [facility]  Visit Type Audio (Virtual visit)    Clinic Weights   Wt Readings from Last 3 Encounters:   05/26/23 0902 85.4 kg (188 lb 4.4 oz)   02/27/23 1621 91.5 kg (201 lb 11.5 oz)   05/31/22 0728 87.8 kg (193 lb 9 oz)     Last Reported Weights No data was found      [facility] INTERVENTION CONTACT:   Method of contact:  Phone Call   or Participant-Initiated Contact?:  -initiated contact  Type of intervention Contact:  Scheduled Session  Did the participant attend session?: Yes    Was the patient called within 15 minutes of the scheduled appointment?:  Yes  Is this a make-up session?: No    Which session materials covered in session?:  Session 12  Patient Reported Weight (From External Jadiel):  188.3  Patient-reported weekly minutes of physical activity::  120  Average Daily Steps: none reported.  Calorie Prescription::  1900  Safety Criteria Triggered:  None  Toolbox Triggered:  None  Weight Graph Stoplight Status for Dietary Adherence:  Red Light     Goals         Grocery shop for meal plan foods (pt-stated)                 Walk 1 mile on treadmill twice a week (pt-stated)              Additional Notes:    Patient reports Doing Well. Patient is Highly Motivated with being 188.3 pounds today and aims to Get Below that weight next week. Patient's plans for eating this week include Buying pre-packaged meals, Packing healthy snacks, and Declining unhealthy options. For exercise, patient plans to continue going to the gym 3 days a week for 40-45 minutes each. This week she started having 8 oz fresh juices as breakfast - using celery, apple, carrots, spinach, violeta, and lemon. She finds this holds her over well until lunch when she usually has a PCF. Still receiving error code on scale although she changed location and batteries. Will continue to monitor closely, but weights are still being transmitted.        Giacomo  Percy MS, RD, LDN  [facility]   [phone]

## 2023-06-02 ENCOUNTER — PATIENT MESSAGE (OUTPATIENT)
Dept: RESEARCH | Facility: CLINIC | Age: 48
End: 2023-06-02
Payer: COMMERCIAL

## 2023-06-02 LAB
FINAL PATHOLOGIC DIAGNOSIS: NORMAL
Lab: NORMAL

## 2023-06-07 ENCOUNTER — PATIENT OUTREACH (OUTPATIENT)
Dept: RESEARCH | Facility: CLINIC | Age: 48
End: 2023-06-07
Payer: COMMERCIAL

## 2023-06-07 NOTE — PROGRESS NOTES
06/07/2023  2:00 PM    Patient Name Sophia Cesar   Appointment Department [facility] NETTA WEIGHT MANAGEMENT  Visit Type Audio (Virtual visit)    Clinic Weights   Wt Readings from Last 3 Encounters:   05/26/23 0902 85.4 kg (188 lb 4.4 oz)   02/27/23 1621 91.5 kg (201 lb 11.5 oz)   05/31/22 0728 87.8 kg (193 lb 9 oz)     Last Reported Weights No data was found      [facility] INTERVENTION CONTACT:   Method of contact:  Phone Call   or Participant-Initiated Contact?:  -initiated contact  Type of intervention Contact:  Scheduled Session  Did the participant attend session?: Yes    Was the patient called within 15 minutes of the scheduled appointment?:  Yes  Is this a make-up session?: No    Which session materials covered in session?:  Session 13  Patient Reported Weight (From External Jadiel):  187.6  Patient-reported weekly minutes of physical activity::  120  Average Daily Steps: none reported.  Calorie Prescription::  1900  Safety Criteria Triggered:  None  Toolbox Triggered:  Adherence to diet  Adherence to diet: Health  must choose at least two interventions from list::  Reduce portion size and Use of motivational interviewing  Weight Graph Stoplight Status for Dietary Adherence:  Red Light     Goals         Grocery shop for meal plan foods (pt-stated)                 Walk 1 mile on treadmill twice a week (pt-stated)              Additional Notes:    Patient reports Doing Well. Patient is Poorly Motivated with being 187.6 pounds today and aims to Get Below that weight next week. Ppt was unsure why her weight went up and we discovered that she has been estimating her portion sizes of carbs like mac and cheese and baked potatoes. She also was eating a whole avocado as a snack as she thought the calorie content was equivalent to vegetables like cucumbers. We spent time discussing the importance of measuring out portion sizes, specifically with carbs and high calorie foods like butter or avocado. Patient's  plans for eating this week include Avoiding temptation, Packing healthy snacks, and Other measuring out portion sizes . For exercise, patient plans to go to the gym 3 times this week.       Giacomo Greer MS, RD, LDN  [facility]   [phone]

## 2023-06-19 ENCOUNTER — PATIENT MESSAGE (OUTPATIENT)
Dept: RESEARCH | Facility: CLINIC | Age: 48
End: 2023-06-19
Payer: COMMERCIAL

## 2023-06-21 ENCOUNTER — PATIENT OUTREACH (OUTPATIENT)
Dept: RESEARCH | Facility: CLINIC | Age: 48
End: 2023-06-21
Payer: COMMERCIAL

## 2023-06-21 NOTE — PROGRESS NOTES
06/21/2023  1:59 PM    Patient Name Sophia Cesar   Appointment Department [facility] NETTA WEIGHT MANAGEMENT  Visit Type Audio (Virtual visit)    Clinic Weights   Wt Readings from Last 3 Encounters:   05/26/23 0902 85.4 kg (188 lb 4.4 oz)   02/27/23 1621 91.5 kg (201 lb 11.5 oz)   05/31/22 0728 87.8 kg (193 lb 9 oz)     Last Reported Weights No data was found      [facility] INTERVENTION CONTACT:   Method of contact:  Phone Call   or Participant-Initiated Contact?:  -initiated contact  Type of intervention Contact:  Scheduled Session  Did the participant attend session?: Yes    Was the patient called within 15 minutes of the scheduled appointment?:  Yes  Is this a make-up session?: No    Which session materials covered in session?:  Session 14 and Session 15  Patient Reported Weight (From External Jadiel):  190.3  Patient-reported weekly minutes of physical activity::  90  Average Daily Steps: none reported.  Calorie Prescription::  1900  Safety Criteria Triggered:  None  Toolbox Triggered:  Adherence to diet  Adherence to diet: Health  must choose at least two interventions from list::  Use of motivational interviewing, Direct dietary modification based on individual needs and Modify eating behavior and meal patterns  Weight Graph Stoplight Status for Dietary Adherence:  Red Light     Goals         Grocery shop for meal plan foods (pt-stated)                 Walk 1 mile on treadmill twice a week (pt-stated)              Additional Notes:    Patient reports Doing Well. Patient is Not Motivated with being 190.3 pounds today and aims to Get Below that weight next week. Reports eating out and not following the meal plan over the past two weeks. Notes that she struggles to get back on track after having one off meal. Also feels that emotional/stress/bored eating is a challenge. We reviewed strategies to handle this and some healthier alternatives to chocolate and sweets. Going grocery shopping today to get  back on track this week. Patient's plans for eating this week include Avoiding temptation, Packing healthy snacks, and Declining unhealthy options. For exercise, patient plans to continue going to the gym twice a week for 45 minutes.           Giacomo Greer MS, RD, LDN  [facility]   [phone]

## 2023-06-28 ENCOUNTER — PATIENT OUTREACH (OUTPATIENT)
Dept: RESEARCH | Facility: CLINIC | Age: 48
End: 2023-06-28
Payer: COMMERCIAL

## 2023-06-28 NOTE — PROGRESS NOTES
06/28/2023  2:01 PM    Patient Name oSphia Cesar   Appointment Department [facility] PROP WEIGHT MANAGEMENT  Visit Type Audio (Virtual visit)    Clinic Weights   Wt Readings from Last 3 Encounters:   05/26/23 0902 85.4 kg (188 lb 4.4 oz)   02/27/23 1621 91.5 kg (201 lb 11.5 oz)   05/31/22 0728 87.8 kg (193 lb 9 oz)     Last Reported Weights No data was found      [facility] PROP INTERVENTION CONTACT:   Method of contact:  Phone Call   or Participant-Initiated Contact?:  -initiated contact  Type of intervention Contact:  Scheduled Session  Did the participant attend session?: Yes    Was the patient called within 15 minutes of the scheduled appointment?:  Yes  Is this a make-up session?: No    Which session materials covered in session?:  Session 16  Patient Reported Weight (From External Jadiel):  186.5  Patient-reported weekly minutes of physical activity::  90  Average Daily Steps: none reported.  Calorie Prescription::  1900  Safety Criteria Triggered:  None  Toolbox Triggered:  None  Weight Graph Stoplight Status for Dietary Adherence:  Yellow Light - Above the Zone     Goals         Grocery shop for meal plan foods (pt-stated)                 Walk 1 mile on treadmill twice a week (pt-stated)              Additional Notes:    Patient reports Doing Well. Patient is Highly Motivated with being 186.5 pounds today and aims to Get Below that weight next week. Ppt is very proud of her 4 pound weight loss this past week. She stuck to her goal of only eating out once! She is also enjoying having frozen grapes as a sweet treat instead of candy. Patient's plans for eating this week include Avoiding temptation, Packing healthy snacks, and Declining unhealthy options. For exercise, patient plans to continue going to the gym for 45 minutes twice a week.       Giacomo Greer, MS, RD, LDN  [facility]   [phone]

## 2023-07-05 ENCOUNTER — PATIENT OUTREACH (OUTPATIENT)
Dept: RESEARCH | Facility: CLINIC | Age: 48
End: 2023-07-05
Payer: COMMERCIAL

## 2023-07-05 NOTE — PROGRESS NOTES
07/05/2023  2:01 PM    Patient Name Sophia Cesar   Appointment Department [facility] NETTA WEIGHT MANAGEMENT  Visit Type Audio (Virtual visit)    Clinic Weights   Wt Readings from Last 3 Encounters:   05/26/23 0902 85.4 kg (188 lb 4.4 oz)   02/27/23 1621 91.5 kg (201 lb 11.5 oz)   05/31/22 0728 87.8 kg (193 lb 9 oz)     Last Reported Weights No data was found      [facility] INTERVENTION CONTACT:   Method of contact:  Phone Call   or Participant-Initiated Contact?:  -initiated contact  Type of intervention Contact:  Scheduled Session  Did the participant attend session?: Yes    Was the patient called within 15 minutes of the scheduled appointment?:  Yes  Is this a make-up session?: No    Which session materials covered in session?:  Session 17  Patient Reported Weight (From External Jadiel):  186.3  Patient-reported weekly minutes of physical activity::  90  Average Daily Steps: none reported.  Calorie Prescription::  1900  Safety Criteria Triggered:  None  Toolbox Triggered:  Adherence to diet  Adherence to diet: Health  must choose at least two interventions from list::  Use of motivational interviewing and Direct dietary modification based on individual needs  Weight Graph Stoplight Status for Dietary Adherence:  Red Light     Goals         Grocery shop for meal plan foods (pt-stated)                 Walk 1 mile on treadmill twice a week (pt-stated)              Additional Notes:    Patient reports Doing Well. Patient is Highly Motivated with being 186.3 pounds today and aims to Get Below that weight next week. She reports doing well during the week, but took an unplanned trip to [place] for 3 days. She notes that drinking alcohol likely caused her to choose more unhealthy meals. She is positive about getting back into her new routine this week and already went to the gym yesterday. She is feeling motivated and has adopted a more positive mindset which is helping manage stress too. Patient's plans  for eating this week include Avoiding temptation, Packing healthy snacks, and Declining unhealthy options. For exercise, patient plans to continue going to the gym twice a week for 45 minutes.         Giacomo Greer MS, RD, LDN  [facility]   [phone]

## 2023-07-12 ENCOUNTER — PATIENT OUTREACH (OUTPATIENT)
Dept: RESEARCH | Facility: CLINIC | Age: 48
End: 2023-07-12
Payer: COMMERCIAL

## 2023-07-12 NOTE — PROGRESS NOTES
07/12/2023  2:00 PM    Patient Name Sophia Cesar   Appointment Department [facility] PROP WEIGHT MANAGEMENT  Visit Type Audio (Virtual visit)    Clinic Weights   Wt Readings from Last 3 Encounters:   05/26/23 0902 85.4 kg (188 lb 4.4 oz)   02/27/23 1621 91.5 kg (201 lb 11.5 oz)   05/31/22 0728 87.8 kg (193 lb 9 oz)     Last Reported Weights No data was found      [facility] INTERVENTION CONTACT:   Method of contact:  Phone Call   or Participant-Initiated Contact?:  -initiated contact  Type of intervention Contact:  Scheduled Session  Did the participant attend session?: Yes    Was the patient called within 15 minutes of the scheduled appointment?:  Yes  Is this a make-up session?: No    Which session materials covered in session?:  Session 18  Patient Reported Weight (From External Jadiel):  186.3  Patient-reported weekly minutes of physical activity::  45  Average Daily Steps: none reported.  Calorie Prescription::  1900  Safety Criteria Triggered:  None  Toolbox Triggered:  Physical Activity  Physical Activity::  Add steps to what you are already doing each day.  Weight Graph Stoplight Status for Dietary Adherence:  Red Light     Goals         Grocery shop for meal plan foods (pt-stated)                 Walk 1 mile on treadmill twice a week (pt-stated)              Additional Notes:    Patient reports Doing Well. Patient is Poorly Motivated with being 186.3 pounds today and aims to Get Below that weight next week. Patient's plans for eating this week include Buying pre-packaged meals, Avoiding temptation, and Packing healthy snacks. She did not have any alcohol this week and reports portion sizing and eating well. For exercise, patient plans to still make an effort to get to the gym twice a week for 45 minutes. She mentioned having to stay late at work recently, so we discussed ways to incorporate movement throughout the day since her job is sedentary.       Giacomo Greer, MS, RD, LDN  [facility]    [phone]

## 2023-07-19 ENCOUNTER — PATIENT OUTREACH (OUTPATIENT)
Dept: RESEARCH | Facility: CLINIC | Age: 48
End: 2023-07-19
Payer: COMMERCIAL

## 2023-07-19 NOTE — PROGRESS NOTES
07/19/2023  2:03 PM    Patient Name Sophia Cesar   Appointment Department [facility] NETTA WEIGHT MANAGEMENT  Visit Type Audio (Virtual visit)    Clinic Weights   Wt Readings from Last 3 Encounters:   05/26/23 0902 85.4 kg (188 lb 4.4 oz)   02/27/23 1621 91.5 kg (201 lb 11.5 oz)   05/31/22 0728 87.8 kg (193 lb 9 oz)     Last Reported Weights No data was found      [facility] INTERVENTION CONTACT:   Method of contact:  Phone Call   or Participant-Initiated Contact?:  -initiated contact  Type of intervention Contact:  Scheduled Session  Did the participant attend session?: Yes    Was the patient called within 15 minutes of the scheduled appointment?:  Yes  Is this a make-up session?: No    Which session materials covered in session?:  Session 19  Patient Reported Weight (From External Jadiel):  187.6  Patient-reported weekly minutes of physical activity::  0  Average Daily Steps: none reported.  Calorie Prescription::  1900  Safety Criteria Triggered:  None  Toolbox Triggered:  Adherence to diet  Adherence to diet: Health  must choose at least two interventions from list::  Direct dietary modification based on individual needs, Use of motivational interviewing and Problem solving with SMART goals  Weight Graph Stoplight Status for Dietary Adherence:  Red Light     Goals         Grocery shop for meal plan foods (pt-stated)                 Walk 1 mile on treadmill twice a week (pt-stated)              Additional Notes:    Patient reports Doing Well. Patient is Poorly Motivated with being 187.6 pounds today and aims to Get Below that weight next week. She reports having a week with unexpected stressors and found herself not prioritizing the goals we set. We spent time today discussing the importance of preparation and setting yourself up for success. Patient's plans for eating this week include Avoiding temptation and Declining unhealthy options. For exercise, patient plans to exercise 3 days a week for about  30-45 minutes.       Giacomo Greer, MS, RD, LDN  [facility]   [phone]

## 2023-07-25 ENCOUNTER — PATIENT MESSAGE (OUTPATIENT)
Dept: RESEARCH | Facility: CLINIC | Age: 48
End: 2023-07-25
Payer: COMMERCIAL

## 2023-07-26 ENCOUNTER — PATIENT OUTREACH (OUTPATIENT)
Dept: RESEARCH | Facility: CLINIC | Age: 48
End: 2023-07-26
Payer: COMMERCIAL

## 2023-07-26 NOTE — PROGRESS NOTES
07/26/2023  2:07 PM    Patient Name Sophia Cesar   Appointment Department [facility] PROP WEIGHT MANAGEMENT  Visit Type Audio (Virtual visit)    Clinic Weights   Wt Readings from Last 3 Encounters:   05/26/23 0902 85.4 kg (188 lb 4.4 oz)   02/27/23 1621 91.5 kg (201 lb 11.5 oz)   05/31/22 0728 87.8 kg (193 lb 9 oz)     Last Reported Weights No data was found      [facility] INTERVENTION CONTACT:   Method of contact:  Phone Call   or Participant-Initiated Contact?:  -initiated contact  Type of intervention Contact:  Scheduled Session  Did the participant attend session?: Yes    Was the patient called within 15 minutes of the scheduled appointment?:  Yes  Is this a make-up session?: No    Which session materials covered in session?:  Session 20  Patient Reported Weight (From External Jadiel):  186.5  Patient-reported weekly minutes of physical activity::  90  Average Daily Steps: none reported.  Calorie Prescription::  1900  Safety Criteria Triggered:  None  Toolbox Triggered:  None  Weight Graph Stoplight Status for Dietary Adherence:  Red Light     Goals         Grocery shop for meal plan foods (pt-stated)                 Walk 1 mile on treadmill twice a week (pt-stated)              Additional Notes:    Patient reports Doing Well. Patient is Highly Motivated with being 186.5 pounds today and aims to Get Below that weight next week. Although her weights didn't go down much, ppt felt much better this past week and overall feels more positive. She only had 1-2 cold drinks compared to about 10 the week before. She also had no fried foods even while dining out, and this was a huge win. Started using an jadiel that helps her focus on completing tasks. Patient's plans for eating this week include Avoiding temptation, Packing healthy snacks, and Declining unhealthy options. For exercise, patient plans to continue going to the gym for about 30 minutes 3 times a week.         Giacomo Greer, MS, RD, LDN  [facility]    [phone]

## 2023-08-02 ENCOUNTER — PATIENT OUTREACH (OUTPATIENT)
Dept: RESEARCH | Facility: CLINIC | Age: 48
End: 2023-08-02
Payer: COMMERCIAL

## 2023-08-02 NOTE — PROGRESS NOTES
08/02/2023  2:00 PM    Patient Name Sophia Cesar   Appointment Department [facility] PROP WEIGHT MANAGEMENT  Visit Type Audio (Virtual visit)    Clinic Weights   Wt Readings from Last 3 Encounters:   05/26/23 0902 85.4 kg (188 lb 4.4 oz)   02/27/23 1621 91.5 kg (201 lb 11.5 oz)   05/31/22 0728 87.8 kg (193 lb 9 oz)     Last Reported Weights No data was found      [facility] INTERVENTION CONTACT:   Method of contact:  Phone Call   or Participant-Initiated Contact?:  -initiated contact  Type of intervention Contact:  Scheduled Session  Did the participant attend session?: Yes    Was the patient called within 15 minutes of the scheduled appointment?:  Yes  Is this a make-up session?: No    Which session materials covered in session?:  Session 21  Patient Reported Weight (From External Jadiel):  183.2  Safety Criteria Triggered:  None  Weight Graph Stoplight Status for Dietary Adherence:  Red Light       Goals         Grocery shop for meal plan foods (pt-stated)                 Walk 1 mile on treadmill twice a week (pt-stated)              Additional Notes:    Patient reports Doing Well. Patient is Highly Motivated with being 183.2 pounds today and aims to Get Below that weight next week. She is very motivated and proud that she accomplished her exercise goal from last week. She has also been cooking more at home, eating more fruit like watermelon and blueberries, and not eating out. She finally found an herbal tea that she enjoys! Had 1-2 cold drinks but this is still an improvement from before. Patient's plans for eating this week include Avoiding temptation, Packing healthy snacks, and Declining unhealthy options. For exercise, patient plans to continue going to the gym for 30 minutes 3 days a week. She is parking far away and intentionally getting more steps in at work.         Giacomo Greer, MS, RD, LDN  [facility]   [phone]

## 2023-08-09 ENCOUNTER — PATIENT OUTREACH (OUTPATIENT)
Dept: RESEARCH | Facility: CLINIC | Age: 48
End: 2023-08-09
Payer: COMMERCIAL

## 2023-08-09 NOTE — PROGRESS NOTES
08/09/2023  1:59 PM    Patient Name Sophia Cesar   Appointment Department [facility] NETTA WEIGHT MANAGEMENT  Visit Type Audio (Virtual visit)    Clinic Weights   Wt Readings from Last 3 Encounters:   05/26/23 0902 85.4 kg (188 lb 4.4 oz)   02/27/23 1621 91.5 kg (201 lb 11.5 oz)   05/31/22 0728 87.8 kg (193 lb 9 oz)     Last Reported Weights No data was found      [facility] INTERVENTION CONTACT:   Method of contact:  Phone Call   or Participant-Initiated Contact?:  -initiated contact  Type of intervention Contact:  Scheduled Session  Did the participant attend session?: Yes    Was the patient called within 15 minutes of the scheduled appointment?:  Yes  Is this a make-up session?: No    Which session materials covered in session?:  Session 22  Patient Reported Weight (From External Jadiel):  185.8  Patient-reported weekly minutes of physical activity::  60  Average Daily Steps: none reported.  Calorie Prescription::  1900  Safety Criteria Triggered:  None  Toolbox Triggered:  Physical Activity  Physical Activity::  Add steps to what you are already doing each day.  Weight Graph Stoplight Status for Dietary Adherence:  Red Light       Goals         Grocery shop for meal plan foods (pt-stated)                 Walk 1 mile on treadmill twice a week (pt-stated)              Additional Notes:    Patient reports Doing Well. Patient is Highly Motivated with being 185.8 pounds today and aims to Get Below that weight next week. Believes that her weight went down lower last week due to having a virus. Patient's plans for eating this week include Avoiding temptation, Packing healthy snacks, and Declining unhealthy options.  This week she went to the gym twice and walked frequently between work and doing back to school shopping. For exercise, patient plans to stick to going to the gym twice a week for about 30 minutes and continuing to get extra steps in around work. For today's session on sleep,we spoke extensively  regarding ways to incorporate a nighttime routine and how to get more restful sleep. She thinks she is sleeping about 6 hours each night and usually wakes up around 2am - sometimes she will just stay up until 6 am and other times she will fall back asleep.         Giacomo Greer MS, RD, LDN  [facility]   [phone]

## 2023-08-16 ENCOUNTER — PATIENT OUTREACH (OUTPATIENT)
Dept: RESEARCH | Facility: CLINIC | Age: 48
End: 2023-08-16
Payer: COMMERCIAL

## 2023-08-16 NOTE — PROGRESS NOTES
08/16/2023  1:54 PM    Patient Name Sophia Cesar   Appointment Department [facility] NETTA WEIGHT MANAGEMENT  Visit Type Audio (Virtual visit)    Clinic Weights   Wt Readings from Last 3 Encounters:   05/26/23 0902 85.4 kg (188 lb 4.4 oz)   02/27/23 1621 91.5 kg (201 lb 11.5 oz)   05/31/22 0728 87.8 kg (193 lb 9 oz)     Last Reported Weights No data was found      [facility] INTERVENTION CONTACT:   Method of contact:  Phone Call   or Participant-Initiated Contact?:  -initiated contact  Type of intervention Contact:  Scheduled Session  Did the participant attend session?: Yes    Was the patient called within 15 minutes of the scheduled appointment?:  Yes  Is this a make-up session?: No    Which session materials covered in session?:  Session 23  Patient Reported Weight (From External Jadiel):  184.5  Patient-reported weekly minutes of physical activity::  60  Average daily steps per day via Fitbit or activity tracker measurement::  3500  Calorie Prescription::  1900  Safety Criteria Triggered:  None  Toolbox Triggered:  Adherence to diet  Adherence to diet: Health  must choose at least two interventions from list::  Modify eating behavior and meal patterns, Direct dietary modification based on individual needs and Use of motivational interviewing  Weight Graph Stoplight Status for Dietary Adherence:  Red Light       Goals         Grocery shop for meal plan foods (pt-stated)                 Walk 1 mile on treadmill twice a week (pt-stated)              Additional Notes:    Patient reports Doing Well. Patient is Highly Motivated with being 184.5 pounds today and aims to Get Below that weight next week. Patient's plans for eating this week include Avoiding temptation, Packing healthy snacks, and Declining unhealthy options. For exercise, patient plans to go to the gym for 30 minutes 2-3 times a week. She will continue to park her car farther away at work and when running errands. She charged her fitness watch  and she got up to 4200 steps. She was surprised at how few steps she walked on a daily basis. We reviewed some ways to easily incorporate more steps and get closer to 5,000. She ate out several times this past week due to her kids starting school and having a busy schedule. We reviewed tips for modifying meals to be healthier. Her uptrending weights this past week are likely due to eating out. We reviewed her overall goals and she remains highly motivated to continue working on consistency and healthy choices.       Giacomo Greer, MS, RD, LDN  [email or web address]   [phone]

## 2023-08-23 ENCOUNTER — PATIENT OUTREACH (OUTPATIENT)
Dept: RESEARCH | Facility: CLINIC | Age: 48
End: 2023-08-23
Payer: COMMERCIAL

## 2023-08-23 NOTE — PROGRESS NOTES
08/23/2023  8:31 AM    Patient Name Sophia Cesar   Appointment Department [facility]  Visit Type Audio (Virtual visit)    Clinic Weights   Wt Readings from Last 3 Encounters:   05/26/23 0902 85.4 kg (188 lb 4.4 oz)   02/27/23 1621 91.5 kg (201 lb 11.5 oz)   05/31/22 0728 87.8 kg (193 lb 9 oz)     Last Reported Weights No data was found      [facility] INTERVENTION CONTACT:   Method of contact:  Phone Call   or Participant-Initiated Contact?:  -initiated contact  Type of intervention Contact:  Scheduled Session  Did the participant attend session?: Yes    Was the patient called within 15 minutes of the scheduled appointment?:  Yes  Is this a make-up session?: No    Which session materials covered in session?:  Session 24  Patient Reported Weight (From External Jadiel):  188.7  Patient-reported weekly minutes of physical activity::  60  Average daily steps per day via Fitbit or activity tracker measurement::  3500  Calorie Prescription::  1900  Safety Criteria Triggered:  None  Toolbox Triggered:  Adherence to diet  Adherence to diet: Health  must choose at least two interventions from list::  Reduce portion size, Modify eating behavior and meal patterns and Use of motivational interviewing  Weight Graph Stoplight Status for Dietary Adherence:  Red Light       Goals         Grocery shop for meal plan foods (pt-stated)                 Walk 1 mile on treadmill twice a week (pt-stated)              Additional Notes:    Patient reports Doing Well. Patient is Poorly Motivated with being 188.7 pounds today and aims to Get Below that weight next week. She admits to not making good food choices this past week. We discussed some less healthy choices she made and the importance of being more mindful of portion sizes. She stated that she wants to take more self-accountability for her eating choices. We reflected on these past 6 months, and she stated learning more about her body and herself that  she expected. Patient's plans for eating this week include Avoiding temptation, Creating a dining out plan, and Declining unhealthy options. For exercise, patient plans to continue aiming to go to the gym for about 30 minutes 2-3 times a week. She noticed that she is usually getting around 3,500 steps at work and the highest amount she walked was 4,600 steps on Saturday. We reviewed some strategies to add small walking breaks throughout the day at her sedentary job. She feels that getting 5,000 steps 3-4 days a week is attainable.        Giacomo Greer, MS, RD, LDN  [facility]   [phone]

## 2023-09-08 ENCOUNTER — TELEPHONE (OUTPATIENT)
Dept: FAMILY MEDICINE | Facility: CLINIC | Age: 48
End: 2023-09-08
Payer: COMMERCIAL

## 2023-09-08 ENCOUNTER — PATIENT MESSAGE (OUTPATIENT)
Dept: FAMILY MEDICINE | Facility: CLINIC | Age: 48
End: 2023-09-08
Payer: COMMERCIAL

## 2023-09-08 DIAGNOSIS — E11.9 CONTROLLED TYPE 2 DIABETES MELLITUS WITHOUT COMPLICATION, WITHOUT LONG-TERM CURRENT USE OF INSULIN: ICD-10-CM

## 2023-09-08 RX ORDER — SEMAGLUTIDE 1.34 MG/ML
0.5 INJECTION, SOLUTION SUBCUTANEOUS
Qty: 1 EACH | Refills: 1 | Status: SHIPPED | OUTPATIENT
Start: 2023-09-08 | End: 2024-02-06 | Stop reason: SDUPTHER

## 2023-09-08 NOTE — TELEPHONE ENCOUNTER
Patient should be scheduled w/me for late October 2023 for diabetes f/u.    Ok to increase Ozempic 0.25 mg weekly to 0.5 mg weekly.    Thanks.    I have put the following orders and/or medications to this note.  Please advise pt and assist.    No orders of the defined types were placed in this encounter.      Medications Ordered This Encounter   Medications    semaglutide (OZEMPIC) 0.25 mg or 0.5 mg(2 mg/1.5 mL) pen injector     Sig: Inject 0.5 mg into the skin every 7 days.     Dispense:  1 each     Refill:  1

## 2023-09-08 NOTE — TELEPHONE ENCOUNTER
Good Morning, so i was interested in increasing my dosage from .25 to .50. Will i need to see dr shrestha or get labs done to reevaluate my prescription. Im wanting to do this because i was noticing my appetite suppression in the beginning of taking it but not so much now. I was talking to someone else and they suggested i increase the dosage but i needed to ask my dr first. Thanks for the info!

## 2023-09-20 ENCOUNTER — PATIENT OUTREACH (OUTPATIENT)
Dept: RESEARCH | Facility: CLINIC | Age: 48
End: 2023-09-20
Payer: COMMERCIAL

## 2023-09-20 NOTE — PROGRESS NOTES
09/20/2023  1:50 PM    Patient Name Sophia Cesar   Appointment Department [facility]NAG[facility]  Visit Type Audio (Virtual visit)    Clinic Weights   Wt Readings from Last 3 Encounters:   05/26/23 0902 85.4 kg (188 lb 4.4 oz)   02/27/23 1621 91.5 kg (201 lb 11.5 oz)   05/31/22 0728 87.8 kg (193 lb 9 oz)     Last Reported Weights No data was found      [facility] INTERVENTION CONTACT:   Method of contact:  Phone Call   or Participant-Initiated Contact?:  -initiated contact  Type of intervention Contact:  Scheduled Session  Did the participant attend session?: Yes    Was the patient called within 15 minutes of the scheduled appointment?:  Yes  Is this a make-up session?: No    Which session materials covered in session?:  Session 25  Patient Reported Weight (From External Jadiel):  187.2  Patient-reported weekly minutes of physical activity::  30  Average Daily Steps: none reported.  Calorie Prescription::  1900  Safety Criteria Triggered:  None  Toolbox Triggered:  Psychosocial and Behavioral  Psychosocial and behavioral::  Use of motivational interviewing and Address binge or emotional eating  Weight Graph Stoplight Status for Dietary Adherence:  Red Light       Goals         Grocery shop for meal plan foods (pt-stated)                 Walk 1 mile on treadmill twice a week (pt-stated)              Additional Notes:    Patient reports Doing Well. She reports not making good food choices and not knowing why. However, we dug deeper and she had several stressful events happen over the past month (family death, car trouble, fitness watch broke) which has caused her to emotional/stress eat and not take her medicine properly. We reviewed strategies for combating emotional eating when she is out and when she is at home. Patient is Poorly Motivated with being 187.2 pounds today and aims to Get Below that weight next week. Encouraged ppt to see the silver lining that her weight overall went down even though  she had a challenging month. Patient's plans for eating this week include Avoiding temptation and Declining unhealthy options. For exercise, patient plans to go to the gym twice a week and exercise at home once a week. She created positive affirmation stickers and wants to try doing this more often instead of watching tv or scrolling on social media.          Giacomo Greer MS, RD, LDN  [facility]   [phone]

## 2023-09-28 ENCOUNTER — OFFICE VISIT (OUTPATIENT)
Dept: DERMATOLOGY | Facility: CLINIC | Age: 48
End: 2023-09-28
Payer: COMMERCIAL

## 2023-09-28 DIAGNOSIS — L59.0 ERYTHEMA AB IGNE: Primary | ICD-10-CM

## 2023-09-28 DIAGNOSIS — D23.9 DERMATOFIBROMA: ICD-10-CM

## 2023-09-28 PROCEDURE — 3044F HG A1C LEVEL LT 7.0%: CPT | Mod: CPTII,95,, | Performed by: PHYSICIAN ASSISTANT

## 2023-09-28 PROCEDURE — 1159F PR MEDICATION LIST DOCUMENTED IN MEDICAL RECORD: ICD-10-PCS | Mod: CPTII,95,, | Performed by: PHYSICIAN ASSISTANT

## 2023-09-28 PROCEDURE — 3066F PR DOCUMENTATION OF TREATMENT FOR NEPHROPATHY: ICD-10-PCS | Mod: CPTII,95,, | Performed by: PHYSICIAN ASSISTANT

## 2023-09-28 PROCEDURE — 3044F PR MOST RECENT HEMOGLOBIN A1C LEVEL <7.0%: ICD-10-PCS | Mod: CPTII,95,, | Performed by: PHYSICIAN ASSISTANT

## 2023-09-28 PROCEDURE — 3066F NEPHROPATHY DOC TX: CPT | Mod: CPTII,95,, | Performed by: PHYSICIAN ASSISTANT

## 2023-09-28 PROCEDURE — 1160F RVW MEDS BY RX/DR IN RCRD: CPT | Mod: CPTII,95,, | Performed by: PHYSICIAN ASSISTANT

## 2023-09-28 PROCEDURE — 99203 PR OFFICE/OUTPT VISIT, NEW, LEVL III, 30-44 MIN: ICD-10-PCS | Mod: 95,,, | Performed by: PHYSICIAN ASSISTANT

## 2023-09-28 PROCEDURE — 4010F ACE/ARB THERAPY RXD/TAKEN: CPT | Mod: CPTII,95,, | Performed by: PHYSICIAN ASSISTANT

## 2023-09-28 PROCEDURE — 1160F PR REVIEW ALL MEDS BY PRESCRIBER/CLIN PHARMACIST DOCUMENTED: ICD-10-PCS | Mod: CPTII,95,, | Performed by: PHYSICIAN ASSISTANT

## 2023-09-28 PROCEDURE — 99203 OFFICE O/P NEW LOW 30 MIN: CPT | Mod: 95,,, | Performed by: PHYSICIAN ASSISTANT

## 2023-09-28 PROCEDURE — 3061F PR NEG MICROALBUMINURIA RESULT DOCUMENTED/REVIEW: ICD-10-PCS | Mod: CPTII,95,, | Performed by: PHYSICIAN ASSISTANT

## 2023-09-28 PROCEDURE — 1159F MED LIST DOCD IN RCRD: CPT | Mod: CPTII,95,, | Performed by: PHYSICIAN ASSISTANT

## 2023-09-28 PROCEDURE — 4010F PR ACE/ARB THEARPY RXD/TAKEN: ICD-10-PCS | Mod: CPTII,95,, | Performed by: PHYSICIAN ASSISTANT

## 2023-09-28 PROCEDURE — 3061F NEG MICROALBUMINURIA REV: CPT | Mod: CPTII,95,, | Performed by: PHYSICIAN ASSISTANT

## 2023-09-28 NOTE — PROGRESS NOTES
Subjective:      Patient ID:  Sophia Cesar is a 47 y.o. female who presents for No chief complaint on file.    The patient location is: [place], [place]  The chief complaint leading to consultation is: discoloration    Visit type: audiovisual    Face to Face time with patient: 8 minutes  12 minutes of total time spent on the encounter, which includes face to face time and non-face to face time preparing to see the patient (eg, review of tests), Obtaining and/or reviewing separately obtained history, Documenting clinical information in the electronic or other health record, Independently interpreting results (not separately reported) and communicating results to the patient/family/caregiver, or Care coordination (not separately reported).         Each patient to whom he or she provides medical services by telemedicine is:  (1) informed of the relationship between the physician and patient and the respective role of any other health care provider with respect to management of the patient; and (2) notified that he or she may decline to receive medical services by telemedicine and may withdraw from such care at any time.    Notes:     C/o discoloration of right knee. Duration 1 + year. Denies sherri rash, itching, or dryness. Does not believe it has gotten bigger. Endorses sitting w/small personal heater at her desk which is located by the right knee. Denies other areas of discoloration.     C/o firm bump of lower leg, duration years. Endorses sensitivity to insect bites.         Review of Systems   Constitutional:  Negative for fever and chills.   Gastrointestinal:  Negative for nausea and vomiting.   Skin:  Positive for rash and activity-related sunscreen use. Negative for itching, dry skin, sun sensitivity, daily sunscreen use, recent sunburn, dry lips and abscesses.   Hematologic/Lymphatic: Does not bruise/bleed easily.       Objective:   Physical Exam   Constitutional: She appears well-developed and  well-nourished. No distress.   Neurological: She is alert and oriented to person, place, and time. She is not disoriented.   Psychiatric: She has a normal mood and affect.   Skin:   Areas Examined (abnormalities noted in diagram):   Head / Face Inspection Performed  Neck Inspection Performed  Chest / Axilla Inspection Performed  Back Inspection Performed  RUE Inspected  LUE Inspection Performed  RLE Inspected  LLE Inspection Performed            Diagram Legend     Erythematous scaling macule/papule c/w actinic keratosis       Vascular papule c/w angioma      Pigmented verrucoid papule/plaque c/w seborrheic keratosis      Yellow umbilicated papule c/w sebaceous hyperplasia      Irregularly shaped tan macule c/w lentigo     1-2 mm smooth white papules consistent with Milia      Movable subcutaneous cyst with punctum c/w epidermal inclusion cyst      Subcutaneous movable cyst c/w pilar cyst      Firm pink to brown papule c/w dermatofibroma      Pedunculated fleshy papule(s) c/w skin tag(s)      Evenly pigmented macule c/w junctional nevus     Mildly variegated pigmented, slightly irregular-bordered macule c/w mildly atypical nevus      Flesh colored to evenly pigmented papule c/w intradermal nevus       Pink pearly papule/plaque c/w basal cell carcinoma      Erythematous hyperkeratotic cursted plaque c/w SCC      Surgical scar with no sign of skin cancer recurrence      Open and closed comedones      Inflammatory papules and pustules      Verrucoid papule consistent consistent with wart     Erythematous eczematous patches and plaques     Dystrophic onycholytic nail with subungual debris c/w onychomycosis     Umbilicated papule    Erythematous-base heme-crusted tan verrucoid plaque consistent with inflamed seborrheic keratosis     Erythematous Silvery Scaling Plaque c/w Psoriasis     See annotation      Assessment / Plan:        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  Advised to d/c space heater at desk. Discussed potential resolution may take several weeks to months once heat source removed.    Dermatofibroma  Reassurance given.  Lesions are benign. Discussed potential association w/insect bites.            No follow-ups on file.

## 2023-09-29 ENCOUNTER — PATIENT MESSAGE (OUTPATIENT)
Dept: DERMATOLOGY | Facility: CLINIC | Age: 48
End: 2023-09-29
Payer: COMMERCIAL

## 2023-10-18 ENCOUNTER — PATIENT OUTREACH (OUTPATIENT)
Dept: RESEARCH | Facility: CLINIC | Age: 48
End: 2023-10-18
Payer: COMMERCIAL

## 2023-10-18 NOTE — PROGRESS NOTES
10/18/2023  1:58 PM    Patient Name Sophia Cesar   Appointment Department [facility]NAG[facility]  Visit Type Audio (Virtual visit)    Clinic Weights   Wt Readings from Last 3 Encounters:   05/26/23 0902 85.4 kg (188 lb 4.4 oz)   02/27/23 1621 91.5 kg (201 lb 11.5 oz)   05/31/22 0728 87.8 kg (193 lb 9 oz)     Last Reported Weights No data was found      [facility] INTERVENTION CONTACT:   Method of contact:  Phone Call   or Participant-Initiated Contact?:  -initiated contact  Type of intervention Contact:  Scheduled Session  Did the participant attend session?: Yes    Was the patient called within 15 minutes of the scheduled appointment?:  Yes  Is this a make-up session?: No    Which session materials covered in session?:  Session 26  Patient Reported Weight (From External Jadiel):  181.9  Patient-reported weekly minutes of physical activity::  60  Average Daily Steps: none reported.  Calorie Prescription::  1900  Safety Criteria Triggered:  None  Weight Graph Stoplight Status for Dietary Adherence:  Red Light       Goals         Grocery shop for meal plan foods (pt-stated)                 Walk 1 mile on treadmill twice a week (pt-stated)              Additional Notes:    Patient reports Doing Well. Patient is Highly Motivated with being 181.9 pounds today and aims to Get Below that weight next week. When I told her she has lost about 5 lbs since our last call she was quite surprised! Sophia had not looked at the weight graph and assumed her weight was about the same. She did share that she can't remember the last time she overate and felt guilty. Does find herself rewarding herself with food when she has an accomplishment. We discussed alternatives to food rewards such as a new shirt or socks to use for exercise. Ppt states feeling great overall and feeling better about stress/emotional eating. Patient's plans for eating this week include Avoiding temptation, Packing healthy snacks, and Declining  unhealthy options. For exercise, patient plans to exercise for about 30 minutes at least 3 days a week. She reports not going to the gym lately but has been walking on the treadmill at home for about 1-2 miles for 1-3 days a week.             Giacomo Greer MS, RD, LDN  [facility]   [phone]

## 2023-11-15 ENCOUNTER — PATIENT OUTREACH (OUTPATIENT)
Dept: RESEARCH | Facility: CLINIC | Age: 48
End: 2023-11-15
Payer: COMMERCIAL

## 2023-11-15 NOTE — PROGRESS NOTES
11/15/2023  1:54 PM    Patient Name Sophia Cesar   Appointment Department [facility] MANAG[facility]  Visit Type Audio (Virtual visit)    Clinic Weights   Wt Readings from Last 3 Encounters:   05/26/23 0902 85.4 kg (188 lb 4.4 oz)   02/27/23 1621 91.5 kg (201 lb 11.5 oz)   05/31/22 0728 87.8 kg (193 lb 9 oz)     Last Reported Weights No data was found      [facility] INTERVENTION CONTACT:   Method of contact:  Phone Call   or Participant-Initiated Contact?:  -initiated contact  Type of intervention Contact:  Scheduled Session  Did the participant attend session?: Yes    Was the patient called within 15 minutes of the scheduled appointment?:  Yes  Is this a make-up session?: No    Which session materials covered in session?:  Session 27  Patient Reported Weight (From External Jadiel):  183.6  Time workout: none reported.  Average Daily Steps: none reported.  Calorie Prescription::  1900  Safety Criteria Triggered:  None  Toolbox Triggered:  Physical Activity  Physical Activity::  Add steps to what you are already doing each day.  Weight Graph Stoplight Status for Dietary Adherence:  Red Light       Goals         Grocery shop for meal plan foods (pt-stated)                 Walk 1 mile on treadmill twice a week (pt-stated)              Additional Notes:    Patient reports Doing Well. Patient is Highly Motivated with being 183.6 pounds today and aims to Get Below that weight next week. She is proud that she hasn't gained any weight in the last 2 weeks, as she has been caring for multiple sick family members. Patient's plans for eating this week include Avoiding temptation, Packing healthy snacks, and Declining unhealthy options. Shared that she has gotten used to having smaller portions and feeling satisfied more quickly. She also has found it helpful to eat out of smaller plates and bowls. Sophia stated that she is going into Thanksgiving with the mindset that she will not over eat. She plans to enjoy  her favorite foods while not overindulging. Has noticed that her water intake has been off lately. She wants to try adding lemon juice to water to see if this helps - does not enjoy Crystal Light or other drink flavorings. For exercise, patient plans to going to the gym or use the treadmill at home for at least 30 minutes 3 days a week. She mentioned trying to gain some accountability/ support from her daughter and friends. We talked about starting a new show that she only watches while she uses the treadmill.            Giacomo Greer MS, RD, LDN  [facility]   [phone]

## 2023-12-20 ENCOUNTER — PATIENT OUTREACH (OUTPATIENT)
Dept: RESEARCH | Facility: CLINIC | Age: 48
End: 2023-12-20
Payer: COMMERCIAL

## 2023-12-20 NOTE — PROGRESS NOTES
12/20/2023  1:57 PM    Patient Name Sophia Cesar   Appointment Department [facility] MANAG[facility]  Visit Type Audio (Virtual visit)    Clinic Weights   Wt Readings from Last 3 Encounters:   05/26/23 0902 85.4 kg (188 lb 4.4 oz)   02/27/23 1621 91.5 kg (201 lb 11.5 oz)   05/31/22 0728 87.8 kg (193 lb 9 oz)     Last Reported Weights No data was found      [facility] INTERVENTION CONTACT:   Method of contact:  Phone Call   or Participant-Initiated Contact?:  -initiated contact  Type of intervention Contact:  Scheduled Session  Did the participant attend session?: Yes    Was the patient called within 15 minutes of the scheduled appointment?:  Yes  Is this a make-up session?: No    Which session materials covered in session?:  Session 28  Patient Reported Weight (From External Jadiel):  179.2  Patient-reported weekly minutes of physical activity::  90  Average Daily Steps: none reported.  Calorie Prescription::  1900  Safety Criteria Triggered:  None  Toolbox Triggered:  None  Weight Graph Stoplight Status for Dietary Adherence:  Yellow Light - In the Zone       Goals         Grocery shop for meal plan foods (pt-stated)                 Walk 1 mile on treadmill twice a week (pt-stated)              Additional Notes:    Patient reports Doing Well. Patient is Highly Motivated with being 179.2 pounds today and aims to Get Below that weight next week. Shared that she has been watching what she eats more closely and sticking to her goal of going to the gym. She believes that her being more consistent has been the biggest challenge, and she is proud that she is seeing progress. Patient's plans for eating this week include Avoiding temptation, Packing healthy snacks, and Declining unhealthy options. Has noticed that she is getting cali with smaller portions. Recently bought new jeans in a smaller size! For exercise, patient plans to continue her routine of exercising for about 30 minutes 3 days a week. Doing  exercise videos on youtube instead of playing on her ipad - also doing more crafts in an attempt to start building productive habits rather than playing on her phone/ipad. She shared that overall she feels great - we discussed how being motivated and mental blocks can be the most difficult part.              Giacomo Greer, MS, RD, LDN  [facility]   [phone]

## 2023-12-22 ENCOUNTER — OFFICE VISIT (OUTPATIENT)
Dept: FAMILY MEDICINE | Facility: CLINIC | Age: 48
End: 2023-12-22
Payer: COMMERCIAL

## 2023-12-22 VITALS
RESPIRATION RATE: 18 BRPM | HEIGHT: 62 IN | HEART RATE: 103 BPM | SYSTOLIC BLOOD PRESSURE: 122 MMHG | BODY MASS INDEX: 35.41 KG/M2 | WEIGHT: 192.44 LBS | OXYGEN SATURATION: 100 % | TEMPERATURE: 99 F | DIASTOLIC BLOOD PRESSURE: 76 MMHG

## 2023-12-22 DIAGNOSIS — R68.89 FLU-LIKE SYMPTOMS: Primary | ICD-10-CM

## 2023-12-22 DIAGNOSIS — J10.1 INFLUENZA A: ICD-10-CM

## 2023-12-22 PROCEDURE — 3008F BODY MASS INDEX DOCD: CPT | Mod: CPTII,S$GLB,, | Performed by: INTERNAL MEDICINE

## 2023-12-22 PROCEDURE — 3078F PR MOST RECENT DIASTOLIC BLOOD PRESSURE < 80 MM HG: ICD-10-PCS | Mod: CPTII,S$GLB,, | Performed by: INTERNAL MEDICINE

## 2023-12-22 PROCEDURE — 3061F PR NEG MICROALBUMINURIA RESULT DOCUMENTED/REVIEW: ICD-10-PCS | Mod: CPTII,S$GLB,, | Performed by: INTERNAL MEDICINE

## 2023-12-22 PROCEDURE — 3066F NEPHROPATHY DOC TX: CPT | Mod: CPTII,S$GLB,, | Performed by: INTERNAL MEDICINE

## 2023-12-22 PROCEDURE — 3061F NEG MICROALBUMINURIA REV: CPT | Mod: CPTII,S$GLB,, | Performed by: INTERNAL MEDICINE

## 2023-12-22 PROCEDURE — 99213 OFFICE O/P EST LOW 20 MIN: CPT | Mod: S$GLB,,, | Performed by: INTERNAL MEDICINE

## 2023-12-22 PROCEDURE — 99999 PR PBB SHADOW E&M-EST. PATIENT-LVL IV: CPT | Mod: PBBFAC,,, | Performed by: INTERNAL MEDICINE

## 2023-12-22 PROCEDURE — 3078F DIAST BP <80 MM HG: CPT | Mod: CPTII,S$GLB,, | Performed by: INTERNAL MEDICINE

## 2023-12-22 PROCEDURE — 3044F HG A1C LEVEL LT 7.0%: CPT | Mod: CPTII,S$GLB,, | Performed by: INTERNAL MEDICINE

## 2023-12-22 PROCEDURE — 99999 PR PBB SHADOW E&M-EST. PATIENT-LVL IV: ICD-10-PCS | Mod: PBBFAC,,, | Performed by: INTERNAL MEDICINE

## 2023-12-22 PROCEDURE — 3044F PR MOST RECENT HEMOGLOBIN A1C LEVEL <7.0%: ICD-10-PCS | Mod: CPTII,S$GLB,, | Performed by: INTERNAL MEDICINE

## 2023-12-22 PROCEDURE — 3074F PR MOST RECENT SYSTOLIC BLOOD PRESSURE < 130 MM HG: ICD-10-PCS | Mod: CPTII,S$GLB,, | Performed by: INTERNAL MEDICINE

## 2023-12-22 PROCEDURE — 3008F PR BODY MASS INDEX (BMI) DOCUMENTED: ICD-10-PCS | Mod: CPTII,S$GLB,, | Performed by: INTERNAL MEDICINE

## 2023-12-22 PROCEDURE — 1159F MED LIST DOCD IN RCRD: CPT | Mod: CPTII,S$GLB,, | Performed by: INTERNAL MEDICINE

## 2023-12-22 PROCEDURE — 4010F PR ACE/ARB THEARPY RXD/TAKEN: ICD-10-PCS | Mod: CPTII,S$GLB,, | Performed by: INTERNAL MEDICINE

## 2023-12-22 PROCEDURE — 3066F PR DOCUMENTATION OF TREATMENT FOR NEPHROPATHY: ICD-10-PCS | Mod: CPTII,S$GLB,, | Performed by: INTERNAL MEDICINE

## 2023-12-22 PROCEDURE — 4010F ACE/ARB THERAPY RXD/TAKEN: CPT | Mod: CPTII,S$GLB,, | Performed by: INTERNAL MEDICINE

## 2023-12-22 PROCEDURE — 99213 PR OFFICE/OUTPT VISIT, EST, LEVL III, 20-29 MIN: ICD-10-PCS | Mod: S$GLB,,, | Performed by: INTERNAL MEDICINE

## 2023-12-22 PROCEDURE — 1159F PR MEDICATION LIST DOCUMENTED IN MEDICAL RECORD: ICD-10-PCS | Mod: CPTII,S$GLB,, | Performed by: INTERNAL MEDICINE

## 2023-12-22 PROCEDURE — 3074F SYST BP LT 130 MM HG: CPT | Mod: CPTII,S$GLB,, | Performed by: INTERNAL MEDICINE

## 2023-12-22 RX ORDER — OSELTAMIVIR PHOSPHATE 75 MG/1
75 CAPSULE ORAL 2 TIMES DAILY
Qty: 10 CAPSULE | Refills: 0 | Status: SHIPPED | OUTPATIENT
Start: 2023-12-22 | End: 2023-12-27

## 2023-12-22 NOTE — PROGRESS NOTES
Subjective     Patient ID: Sophia Cesar is a 48 y.o. female.    Chief Complaint: URI      HPI  Patient presents with URI symptoms. She has been having: fever, chills, body aches, chest pain with cough since Wednesday. She has tried Tylenol, Motrin, Mucinex and Nyquil with no improvement in symptoms    Review of Systems   Constitutional:  Positive for chills and fever. Negative for fatigue.   Respiratory:  Positive for cough. Negative for chest tightness and shortness of breath.    Cardiovascular:  Positive for chest pain. Negative for palpitations.   Gastrointestinal:  Negative for abdominal pain, constipation, diarrhea, nausea and vomiting.   Genitourinary:  Negative for frequency and urgency.   Neurological:  Negative for dizziness, numbness and headaches.          Objective       Current Outpatient Medications:     acyclovir (ZOVIRAX) 400 MG tablet, TAKE 1 TABLET(400 MG) BY MOUTH TWICE DAILY, Disp: 180 tablet, Rfl: 1    lisinopriL (PRINIVIL,ZESTRIL) 5 MG tablet, Take 1 tablet (5 mg total) by mouth once daily., Disp: 90 tablet, Rfl: 2    metFORMIN (GLUCOPHAGE) 500 MG tablet, Take 1 tablet (500 mg total) by mouth daily with breakfast., Disp: 90 tablet, Rfl: 2    pravastatin (PRAVACHOL) 10 MG tablet, Take 1 tablet (10 mg total) by mouth every evening., Disp: 90 tablet, Rfl: 2    semaglutide (OZEMPIC) 0.25 mg or 0.5 mg(2 mg/1.5 mL) pen injector, Inject 0.5 mg into the skin every 7 days., Disp: 1 each, Rfl: 1    flash glucose scanning reader (FREESTYLE STEPHANIE 10 DAY READER) Misc, Use daily as directed - Dx: E11.9, Disp: 1 each, Rfl: 0    flash glucose sensor (FREESTYLE STEPHANIE 10 DAY SENSOR) Kit, Use for up to 10 days as directed - Dx: E11.9, Disp: 3 kit, Rfl: 11     Physical Exam  Constitutional:       General: She is not in acute distress.     Appearance: Normal appearance. She is obese.   HENT:      Head: Normocephalic and atraumatic.   Cardiovascular:      Rate and Rhythm: Normal rate and regular rhythm.       Heart sounds: Normal heart sounds. No murmur heard.     No friction rub. No gallop.   Pulmonary:      Effort: Pulmonary effort is normal.      Breath sounds: Normal breath sounds. No wheezing, rhonchi or rales.   Abdominal:      General: Bowel sounds are normal. There is no distension.      Palpations: Abdomen is soft.      Tenderness: There is no abdominal tenderness. There is no rebound.   Skin:     General: Skin is warm and dry.      Coloration: Skin is not jaundiced.   Neurological:      General: No focal deficit present.      Mental Status: She is alert and oriented to person, place, and time. Mental status is at baseline.   Psychiatric:         Mood and Affect: Mood normal.         Behavior: Behavior normal.            Assessment and Plan     1. Flu-like symptoms  -     POCT Influenza A/B Molecular  -     POCT COVID-19 Rapid Screening      Will test for COVID and Flu     Flu A positive. Will send in Tamiflu    No follow-ups on file.

## 2024-01-05 ENCOUNTER — PATIENT MESSAGE (OUTPATIENT)
Dept: OTHER | Facility: OTHER | Age: 49
End: 2024-01-05
Payer: COMMERCIAL

## 2024-01-17 ENCOUNTER — PATIENT OUTREACH (OUTPATIENT)
Dept: RESEARCH | Facility: CLINIC | Age: 49
End: 2024-01-17
Payer: COMMERCIAL

## 2024-01-17 NOTE — PROGRESS NOTES
01/17/2024  2:00 PM    Patient Name Sophia Cesar   Appointment Department [facility] MANAG[facility]  Visit Type Audio (Virtual visit)    Clinic Weights   Wt Readings from Last 3 Encounters:   12/22/23 1624 87.3 kg (192 lb 7.4 oz)   05/26/23 0902 85.4 kg (188 lb 4.4 oz)   02/27/23 1621 91.5 kg (201 lb 11.5 oz)     Last Reported Weights No data was found      [facility] INTERVENTION CONTACT:   Method of contact:  Phone Call   or Participant-Initiated Contact?:  -initiated contact  Type of intervention Contact:  Scheduled Session  Did the participant attend session?: Yes    Was the patient called within 15 minutes of the scheduled appointment?:  Yes  Is this a make-up session?: No    Which session materials covered in session?:  Session 29  Patient Reported Weight (From External Jadiel):  183.2  Patient-reported weekly minutes of physical activity::  135  Average Daily Steps: none reported.  Calorie Prescription::  1900  Safety Criteria Triggered:  None  Toolbox Triggered:  Self-Monitoring and Adherence to diet  Adherence to diet: Health  must choose at least two interventions from list::  Use of motivational interviewing and Direct dietary modification based on individual needs  Self monitoring::  Health  and participant to review self-monitoring strategies  Weight Graph Stoplight Status for Dietary Adherence:  Red Light       Goals         Grocery shop for meal plan foods (pt-stated)                 Walk 1 mile on treadmill twice a week (pt-stated)              Additional Notes:    Patient reports Doing Well. Patient is Poorly Motivated with being 183.2 pounds today and aims to Get Below that weight next week. Ppt shared that she went a couple weeks without her Ozempic. During that time noticed that her appetite substantially increased. Also believes that her weight went up due to taking prescribed steroids this past week- has been avoiding going on the scale. Ppt shared that although she is  disappointed her weight increased over the past month, she is not discouraged or being as hard on herself. Feels confident that she can get back to under 280 pounds in the upcoming weeks. Getting an updated A1c next week. Patient's plans for eating this week include Packing healthy snacks and Declining unhealthy options. For exercise, patient has been going to the gym with her daughter for about 45 minutes 3 days a week. Also cleaned off her home treadmill so she can use this as well.                 Giacomo Greer MS, RD, LDN  [employer]   [phone]

## 2024-01-28 ENCOUNTER — PATIENT MESSAGE (OUTPATIENT)
Dept: FAMILY MEDICINE | Facility: CLINIC | Age: 49
End: 2024-01-28
Payer: COMMERCIAL

## 2024-01-28 DIAGNOSIS — Z00.00 ANNUAL PHYSICAL EXAM: Primary | ICD-10-CM

## 2024-01-29 NOTE — TELEPHONE ENCOUNTER
Well I really just want to get lab work done to make sure Im on the right track. And I was thinking she would need to see me  but to be honest I dont need to see her if its not necessary   If she could just put in the orders to lab matthieu that would be fine with me. But whatever she thinks is best.   Hope that make sense    Per portal. Pt. Requesting diabetic labs.

## 2024-02-06 DIAGNOSIS — E11.9 CONTROLLED TYPE 2 DIABETES MELLITUS WITHOUT COMPLICATION, WITHOUT LONG-TERM CURRENT USE OF INSULIN: ICD-10-CM

## 2024-02-06 NOTE — TELEPHONE ENCOUNTER
Care Due:                  Date            Visit Type   Department     Provider  --------------------------------------------------------------------------------                                ESTABLISHED                              PATIENT -    [facility]  Last Visit: 05-      [facility]      MEDICINE       Barbi Elkins  Next Visit: None Scheduled  None         None Found                                                            Last  Test          Frequency    Reason                     Performed    Due Date  --------------------------------------------------------------------------------    CMP.........  12 months..  lisinopriL, metFORMIN,     02- 02-                             pravastatin..............    HBA1C.......  6 months...  metFORMIN, semaglutide...  05- 11-    Lipid Panel.  12 months..  pravastatin..............  02- 02-    [facility] Embedded Care Due Messages. Reference number: [id number].   2/06/2024 12:33:09 PM CST

## 2024-02-06 NOTE — TELEPHONE ENCOUNTER
I have put the following orders and/or medications to this note.  Please advise pt and assist by faxing orders to outside lab if needed.    Orders Placed This Encounter   Procedures    Microalbumin/Creatinine Ratio, Urine     Standing Status:   Future     Standing Expiration Date:   4/6/2025     Order Specific Question:   Specimen Source     Answer:   Urine    Hemoglobin A1C     Standing Status:   Future     Standing Expiration Date:   4/6/2025    TSH     Standing Status:   Future     Standing Expiration Date:   4/6/2025    Comprehensive Metabolic Panel     Standing Status:   Future     Standing Expiration Date:   4/6/2025    Lipid Panel     Standing Status:   Future     Standing Expiration Date:   4/6/2025    CBC Auto Differential     Standing Status:   Future     Standing Expiration Date:   4/6/2025    Insulin, Random     Standing Status:   Future     Standing Expiration Date:   4/6/2025

## 2024-02-07 RX ORDER — SEMAGLUTIDE 1.34 MG/ML
0.5 INJECTION, SOLUTION SUBCUTANEOUS
Qty: 1 EACH | Refills: 0 | Status: SHIPPED | OUTPATIENT
Start: 2024-02-07 | End: 2024-02-27 | Stop reason: SDUPTHER

## 2024-02-09 ENCOUNTER — TELEPHONE (OUTPATIENT)
Dept: PHARMACY | Facility: CLINIC | Age: 49
End: 2024-02-09
Payer: COMMERCIAL

## 2024-02-14 NOTE — TELEPHONE ENCOUNTER
Closing encounter after initial inquiry to patient to perform Medication Therapy Management to improve PDC for HEDIS measurements.   Patient declined fill and will follow up with PCP

## 2024-02-15 ENCOUNTER — PATIENT MESSAGE (OUTPATIENT)
Dept: FAMILY MEDICINE | Facility: CLINIC | Age: 49
End: 2024-02-15
Payer: COMMERCIAL

## 2024-02-15 ENCOUNTER — TELEPHONE (OUTPATIENT)
Dept: FAMILY MEDICINE | Facility: CLINIC | Age: 49
End: 2024-02-15
Payer: COMMERCIAL

## 2024-02-16 LAB
ALBUMIN CREATININE RATIO: 3 MG/G (ref 0–29)
ALBUMIN SERPL BCP-MCNC: 4.2 G/DL (ref 3.9–4.9)
ALBUMIN, URINE: 3.7 MG/L
ALBUMIN/GLOBULIN RATIO: 1.4 (ref 1.2–2.2)
ALP SERPL-CCNC: 92 U/L (ref 44–121)
ALT SERPL W P-5'-P-CCNC: 11 U/L (ref 0–32)
AST SERPL-CCNC: 16 U/L (ref 0–40)
BILIRUB SERPL-MCNC: 0.5 MG/DL (ref 0–1.2)
BUN SERPL-MCNC: 11 MG/DL (ref 6–24)
BUN/CREAT RATIO: 14 (ref 9–23)
CALCIUM SERPL-MCNC: 9.1 MG/DL (ref 8.7–10.2)
CHLORIDE SERPL-SCNC: 103 MMOL/L (ref 96–106)
CHOLESTEROL, TOTAL: 165 (ref 100–199)
CO2 SERPL-SCNC: 25 MMOL/L (ref 20–29)
CREAT SERPL-MCNC: 0.8 MG/DL (ref 0.6–1)
CREATININE URINE: 119.7
EGFR: 92
GLOBULIN: 3 (ref 1.5–4.5)
GLUCOSE SERPL-MCNC: 107 MG/DL (ref 70–99)
HBA1C MFR BLD: 5.4 % (ref 4.8–5.6)
HDLC SERPL-MCNC: 52 MG/DL
LDLC SERPL CALC-MCNC: 103 MG/DL (ref 0–99)
POTASSIUM SERPL-SCNC: 4.1 MMOL/L (ref 3.5–5.2)
PROT SNV-MCNC: 7.2 G/L (ref 6–8.5)
SODIUM BLD-SCNC: 139 MMOL/L (ref 134–144)
TRIGL SERPL-MCNC: 47 MG/DL (ref 0–149)
VLDLC SERPL-MCNC: 10 MG/DL (ref 5–40)

## 2024-02-21 ENCOUNTER — TELEPHONE (OUTPATIENT)
Dept: PHARMACY | Facility: CLINIC | Age: 49
End: 2024-02-21
Payer: COMMERCIAL

## 2024-02-21 ENCOUNTER — PATIENT OUTREACH (OUTPATIENT)
Dept: RESEARCH | Facility: CLINIC | Age: 49
End: 2024-02-21
Payer: COMMERCIAL

## 2024-02-21 NOTE — TELEPHONE ENCOUNTER
Assessed patient medication adherence for population health /[facility] medication adherence project

## 2024-02-21 NOTE — PROGRESS NOTES
02/21/2024  2:02 PM    Patient Name Sophia Cesar   Appointment Department [facility]TA WEIGHT MANAGEMENT  Visit Type Audio (Virtual visit)    Clinic Weights   Wt Readings from Last 3 Encounters:   12/22/23 1624 87.3 kg (192 lb 7.4 oz)   05/26/23 0902 85.4 kg (188 lb 4.4 oz)   02/27/23 1621 91.5 kg (201 lb 11.5 oz)     Last Reported Weights No data was found      [facility] INTERVENTION CONTACT:   Method of contact:  Phone Call   or Participant-Initiated Contact?:  -initiated contact  Type of intervention Contact:  Scheduled Session  Did the participant attend session?: Yes    Was the patient called within 15 minutes of the scheduled appointment?:  Yes  Is this a make-up session?: No    Which session materials covered in session?:  Session 30  Patient Reported Weight (From External Jadiel):  183.9  Patient-reported weekly minutes of physical activity::  45  Average Daily Steps: none reported.  Calorie Prescription::  1900  Safety Criteria Triggered:  None  Toolbox Triggered:  Adherence to diet and Physical Activity  Adherence to diet: Health  must choose at least two interventions from list::  Use of motivational interviewing, Modify eating behavior and meal patterns and Direct dietary modification based on individual needs  Physical Activity::  Add steps to what you are already doing each day.  Weight Graph Stoplight Status for Dietary Adherence:  Red Light       Goals         Grocery shop for meal plan foods (pt-stated)                 Walk 1 mile on treadmill twice a week (pt-stated)              Additional Notes:    Patient reports Doing Well. Patient is Poorly Motivated with being 183.9 pounds today and aims to Get Below that weight next week. Continues to get an error 6 code and has moved the scale near a window and also changed the batteries. Shared that she has started drinking cold drinks recently and hardly drinking water. She also tends to have regular lemonade or tea at home rather than  water. She is unsure what brought on this increase in sugar sweetened beverages but acknowledged not feeling great after she drinks them. She is open to trying out Coke Zero at work. Enjoys lemon water and wants to start incorporating that again. Reviewed trying out light or no sugar added lemonade and tea options since she doesn't enjoy the taste of artificial sweeteners. Asked patient to let me know if she continues to have scale issues. Patient's plans for eating this week include Avoiding temptation and Declining unhealthy options. For exercise, patient plans to get into the routine again of going to the gym 3 times a week for 45 minutes. Only went to the gym once a week this past month. She shared the biggest challenge is just actually getting to the gym. We reviewed some strategies to help eliminate this barrier. Shared that she got her A1c done at [facility] last week and it went down to 5.4 from 6.2!               Giacomo Greer, MS, RD, LDN  [facility]   [phone]

## 2024-02-27 ENCOUNTER — OFFICE VISIT (OUTPATIENT)
Dept: FAMILY MEDICINE | Facility: CLINIC | Age: 49
End: 2024-02-27
Attending: FAMILY MEDICINE
Payer: COMMERCIAL

## 2024-02-27 VITALS
HEART RATE: 98 BPM | SYSTOLIC BLOOD PRESSURE: 122 MMHG | TEMPERATURE: 97 F | WEIGHT: 190.5 LBS | RESPIRATION RATE: 18 BRPM | BODY MASS INDEX: 35.06 KG/M2 | HEIGHT: 62 IN | OXYGEN SATURATION: 98 % | DIASTOLIC BLOOD PRESSURE: 74 MMHG

## 2024-02-27 DIAGNOSIS — Z00.00 ANNUAL PHYSICAL EXAM: Primary | ICD-10-CM

## 2024-02-27 DIAGNOSIS — Z12.31 OTHER SCREENING MAMMOGRAM: ICD-10-CM

## 2024-02-27 DIAGNOSIS — Z79.899 ON STATIN THERAPY DUE TO RISK OF FUTURE CARDIOVASCULAR EVENT: ICD-10-CM

## 2024-02-27 DIAGNOSIS — B00.9 HSV-2 INFECTION: ICD-10-CM

## 2024-02-27 DIAGNOSIS — Z79.899 ON ANGIOTENSIN-CONVERTING ENZYME (ACE) INHIBITORS: ICD-10-CM

## 2024-02-27 DIAGNOSIS — E11.9 CONTROLLED TYPE 2 DIABETES MELLITUS WITHOUT COMPLICATION, WITHOUT LONG-TERM CURRENT USE OF INSULIN: ICD-10-CM

## 2024-02-27 DIAGNOSIS — E11.36 DIABETIC CATARACT: ICD-10-CM

## 2024-02-27 DIAGNOSIS — K63.5 BENIGN COLON POLYP: ICD-10-CM

## 2024-02-27 DIAGNOSIS — E89.40 SURGICAL MENOPAUSE: ICD-10-CM

## 2024-02-27 DIAGNOSIS — E66.9 OBESITY (BMI 30.0-34.9): ICD-10-CM

## 2024-02-27 PROBLEM — E66.01 SEVERE OBESITY (BMI 35.0-39.9) WITH COMORBIDITY: Status: RESOLVED | Noted: 2017-05-09 | Resolved: 2024-02-27

## 2024-02-27 PROCEDURE — 3008F BODY MASS INDEX DOCD: CPT | Mod: CPTII,S$GLB,, | Performed by: FAMILY MEDICINE

## 2024-02-27 PROCEDURE — 1160F RVW MEDS BY RX/DR IN RCRD: CPT | Mod: CPTII,S$GLB,, | Performed by: FAMILY MEDICINE

## 2024-02-27 PROCEDURE — 99999 PR PBB SHADOW E&M-EST. PATIENT-LVL V: CPT | Mod: PBBFAC,,, | Performed by: FAMILY MEDICINE

## 2024-02-27 PROCEDURE — 99396 PREV VISIT EST AGE 40-64: CPT | Mod: S$GLB,,, | Performed by: FAMILY MEDICINE

## 2024-02-27 PROCEDURE — 3078F DIAST BP <80 MM HG: CPT | Mod: CPTII,S$GLB,, | Performed by: FAMILY MEDICINE

## 2024-02-27 PROCEDURE — 4010F ACE/ARB THERAPY RXD/TAKEN: CPT | Mod: CPTII,S$GLB,, | Performed by: FAMILY MEDICINE

## 2024-02-27 PROCEDURE — 3074F SYST BP LT 130 MM HG: CPT | Mod: CPTII,S$GLB,, | Performed by: FAMILY MEDICINE

## 2024-02-27 PROCEDURE — 1159F MED LIST DOCD IN RCRD: CPT | Mod: CPTII,S$GLB,, | Performed by: FAMILY MEDICINE

## 2024-02-27 PROCEDURE — 3072F LOW RISK FOR RETINOPATHY: CPT | Mod: CPTII,S$GLB,, | Performed by: FAMILY MEDICINE

## 2024-02-27 RX ORDER — PRAVASTATIN SODIUM 10 MG/1
10 TABLET ORAL NIGHTLY
Qty: 90 TABLET | Refills: 3 | Status: SHIPPED | OUTPATIENT
Start: 2024-02-27

## 2024-02-27 RX ORDER — LISINOPRIL 5 MG/1
5 TABLET ORAL DAILY
Qty: 90 TABLET | Refills: 3 | Status: SHIPPED | OUTPATIENT
Start: 2024-02-27

## 2024-02-27 RX ORDER — ACYCLOVIR 400 MG/1
TABLET ORAL
Qty: 180 TABLET | Refills: 3 | Status: SHIPPED | OUTPATIENT
Start: 2024-02-27

## 2024-02-27 RX ORDER — METFORMIN HYDROCHLORIDE 500 MG/1
500 TABLET ORAL
Qty: 90 TABLET | Refills: 3 | Status: SHIPPED | OUTPATIENT
Start: 2024-02-27 | End: 2025-02-26

## 2024-02-27 RX ORDER — SEMAGLUTIDE 0.68 MG/ML
0.5 INJECTION, SOLUTION SUBCUTANEOUS
COMMUNITY
Start: 2024-02-08 | End: 2024-02-27 | Stop reason: SDUPTHER

## 2024-02-27 RX ORDER — SEMAGLUTIDE 1.34 MG/ML
0.5 INJECTION, SOLUTION SUBCUTANEOUS
Qty: 12 EACH | Refills: 3 | Status: SHIPPED | OUTPATIENT
Start: 2024-02-27

## 2024-02-27 NOTE — PROGRESS NOTES
HISTORY OF PRESENT ILLNESS: Ms. Cesar comes in today not fasting with taking medication and without acute problems for annual wellness examination. She reports no acute problems today.     END OF LIFE DECISION: She does not have a living will and does desire life support.    Current Outpatient Medications   Medication Sig    acyclovir (ZOVIRAX) 400 MG tablet TAKE 1 TABLET(400 MG) BY MOUTH TWICE DAILY    lisinopriL (PRINIVIL,ZESTRIL) 5 MG tablet Take 1 tablet (5 mg total) by mouth once daily.    metFORMIN (GLUCOPHAGE) 500 MG tablet Take 1 tablet (500 mg total) by mouth daily with breakfast.    pravastatin (PRAVACHOL) 10 MG tablet Take 1 tablet (10 mg total) by mouth every evening.    semaglutide (OZEMPIC) 0.25 mg or 0.5 mg(2 mg/1.5 mL) pen injector Inject 0.5 mg into the skin every 7 days.     SCREENINGS:   Cholesterol: February 16, 2024.  FFS/Colonoscopy: May 26, 2023 - benign colon polyp, diverticulosis; repeat in 7 to 10 days.   Mammogram: February 8, 2023 - okay.    GYN Exam: February 27, 2023 - okay. July 23, 2013 pap smear - okay. No longer needs pap smear.  Dexa Scan: Never.  Eye Exam: March 30, 2023 with Dr. Rajeev Smalls. She  wears glasses.   Dental Exam: Summer 2023.   PPD: Never.  Immunizations: Tdap - September 2, 2010, July 4, 2018.  Gardasil - N./A.  Zostavax - N./A.  Pneumovax - October 25, 2018.  Prevnar - Never. She declines.  Hepatitis B vaccine - December 20, 2018, October 25, 2018, September 6, 2019.  Seasonal Flu - November 18, 2022. She declines.  Covid-19 (Pfizer) vaccine series - March 20, 2021, April 10, 2021, January 27, 2022.     ROS:  GENERAL: Denies fever, chills, fatigue, or unusual weight gain. Appetite normal. Weight 91.5 kg (201 lb 11.5 oz) at February 27, 2023 visit.  Exercises 3 times a week, 45-60 minutes each time.  Monitors diet by following portion sizes.  SKIN: Denies rashes, itching, changes in mole, color or texture of skin or easy bruising.   HEAD: Denies headaches or recent  "head trauma.  EYES: Denies change in vision, pain, diplopia, redness or discharge. Wears glasses.  EARS: Denies ear pain, discharge, vertigo or decreased hearing.  NOSE: Denies loss of smell, epistaxis or rhinitis.  MOUTH & THROAT: Denies hoarseness or change in voice. Denies excessive gum bleeding or mouth sores. Denies sore throat.  NODES: Denies swollen glands.  CHEST: Denies BIRD, wheezing, cough, or sputum production.  CARDIOVASCULAR: Denies chest pain, PND, orthopnea or reduced exercise tolerance. Denies palpitations.  ABDOMEN: Denies diarrhea, constipation, nausea, vomiting, abdominal pain, or blood in stool.  URINARY: Denies flank pain, dysuria or hematuria.  GENITOURINARY: Denies flank pain, dysuria, frequency or hematuria. Does not perform monthly breast self exam.   ENDOCRINE: Denies cholesterol, thyroid problems. Reports performs home glucose checks with levels ranging <120.  HEME/LYMPH: Denies bleeding problems.  PERIPHERAL VASCULAR:Denies claudication or cyanosis.  MUSCULOSKELETAL: Denies joint stiffness, pain or swelling. Denies edema.  NEUROLOGIC: Denies history of seizures, tremors, paralysis, alteration of gait or coordination.  PSYCHIATRIC: Denies mood swings, depression, anxiety, homicidal or suicidal thoughts. Denies sleep problems.      PE:   VS: Blood Pressure 122/74   Pulse 98   Temperature 97.4 °F (36.3 °C) (Temporal)   Respiration 18   Height 5' 2" (1.575 m)   Weight 86.4 kg (190 lb 7.6 oz)   Oxygen Saturation 98%   Body Mass Index 34.84 kg/m²   APPEARANCE: Well nourished, well developed female, obese and pleasant, alert and oriented, in no acute distress.   HEAD: Non tender. Full range of motion.  EYES: PERRL, conjunctiva pink, lids no edema. She wears glasses.  EARS: External canal patent, no swelling or redness. TM's shiny and clear.  NOSE: Mucosa and turbinates pink, not swollen. No discharge. Non tender sinuses.  THROAT: No pharyngeal erythema or exudate. No stridor.   NECK: " Supple, no mass, thyroid not enlarged.  NODES: No cervical, axillary or inguinal lymph node enlargement.  CHEST: Normal respiratory effort. Lungs clear to auscultation.  CARDIOVASCULAR: Normal S1, S2. No rubs, murmurs or gallops. PMI not displaced. No carotid bruit. Pedal pulses palpable bilaterally. No edema.  ABDOMEN: Bowel sounds present. Not distended. Soft. No tenderness, masses or organomegaly.  BREAST EXAM: Symmetrical, no external lesions, no discharge, no masses palpated.  PELVIC EXAM: No external lesions noted, no discharge, absent cervix and uterus, bimanual exam showed no adnexal masses or tenderness. Urethra and bladder intact. However, chronic, non tender cyst at bottom, inner aspect of left vulvar noted.  RECTAL EXAM: Not examined per patient request.  MUSCULOSKELETAL: No joint deformities or stiffness. She is ambulatory without problems.  SKIN: No rashes or suspicious lesions, normal color and turgor. Chronic  hyperpigmented skin at right knee (as sits in front on space heater).  NEUROLOGIC: Cranial Nerves: II-XII grossly intact. DTR's: Knees, Ankles 2+ and equal bilaterally. Gait & Posture: Normal gait and fine motion.  PSYCHIATRIC: Patient alert, oriented x 3. Mood/Affect normal. Judgment/insight good as she makes appropriate decisions during today's exam.    2/16/2024 lab (performed at [facility]) results reviewed and will be noted on chart.    Protective Sensation (w/ 10 gram monofilament):  Right: Intact  Left: Intact    Visual Inspection:  Normal -  Bilateral    Pedal Pulses:   Right: Present  Left: Present    Posterior tibialis:   Right:Present  Left: Present     ASSESSMENT:    ICD-10-CM ICD-9-CM    1. Annual physical exam  Z00.00 V70.0       2. Controlled type 2 diabetes mellitus without complication, without long-term current use of insulin  E11.9 250.00 semaglutide (OZEMPIC) 0.25 mg or 0.5 mg(2 mg/1.5 mL) pen injector      metFORMIN (GLUCOPHAGE) 500 MG tablet      Ambulatory referral/consult  to Optometry      3. Diabetic cataract  E11.36 250.50      366.41       4. On angiotensin-converting enzyme (ACE) inhibitors  Z79.899 V07.52 lisinopriL (PRINIVIL,ZESTRIL) 5 MG tablet      5. On statin therapy due to risk of future cardiovascular event  Z79.899 V58.69 pravastatin (PRAVACHOL) 10 MG tablet      6. HSV-2 infection  B00.9 054.9 acyclovir (ZOVIRAX) 400 MG tablet      7. Benign colon polyp  K63.5 211.3       8. Obesity (BMI 30.0-34.9)  E66.9 278.00       9. Surgical menopause  E89.40 627.4       10. Other screening mammogram  Z12.31 V76.12 Mammo Digital Screening Bilat w/ Gerard          PLAN:  1. Age-appropriate counseling-appropriate low-sodium, low-cholesterol, low carbohydrate diet and exercise daily, monthly breast self exam, annual wellness examination.   2. Patient advised to call for results.  3. Continue current medications.  4. Prescription refills as noted above.  7. Keep follow up with specialists.  8. Annual eye and dental examinations advised.                  9. Follow up in about 6 months (around 8/27/2024) for diabetes follow up.

## 2024-03-11 ENCOUNTER — PATIENT OUTREACH (OUTPATIENT)
Dept: ADMINISTRATIVE | Facility: HOSPITAL | Age: 49
End: 2024-03-11
Payer: COMMERCIAL

## 2024-03-11 NOTE — PROGRESS NOTES
Population Health Chart Review & Patient Outreach Details      Additional Pop Health Notes:    Manually uploaded and hyper-linked HEMOGLOBIN A1C, CMP, LIPID PANEL &  MICROALBUMIN-CREATININE URINE 2/16/2024           Updates Requested / Reviewed:      Care Everywhere, , Care Team Updated, and Immunizations Reconciliation Completed or Queried: [place]         Health Maintenance Topics Overdue:      [facility] Score: 1     Foot Exam                       Health Maintenance Topic(s) Outreach Outcomes & Actions Taken:    Lab(s) - Outreach Outcomes & Actions Taken  : External Records Uploaded & Care Team Updated if Applicable

## 2024-03-13 ENCOUNTER — TELEPHONE (OUTPATIENT)
Dept: PHARMACY | Facility: CLINIC | Age: 49
End: 2024-03-13
Payer: COMMERCIAL

## 2024-03-25 ENCOUNTER — PATIENT OUTREACH (OUTPATIENT)
Dept: RESEARCH | Facility: CLINIC | Age: 49
End: 2024-03-25
Payer: COMMERCIAL

## 2024-03-25 NOTE — PROGRESS NOTES
03/25/2024  2:00 PM    Patient Name Sophia Cesar   Appointment Department [facility]TA WEIGHT MANAGEMENT  Visit Type Audio (Virtual visit)    Clinic Weights   Wt Readings from Last 3 Encounters:   02/27/24 1308 86.4 kg (190 lb 7.6 oz)   12/22/23 1624 87.3 kg (192 lb 7.4 oz)   05/26/23 0902 85.4 kg (188 lb 4.4 oz)     Last Reported Weights No data was found      [facility] INTERVENTION CONTACT:   Method of contact:  Phone Call   or Participant-Initiated Contact?:  -initiated contact  Type of intervention Contact:  Scheduled Session  Did the participant attend session?: Yes    Was the patient called within 15 minutes of the scheduled appointment?:  Yes  Is this a make-up session?: No    Which session materials covered in session?:  Session 31  Patient Reported Weight (From External Jadiel):  -6  Time workout: not reported.  Average Daily Steps: not reported.  Calorie Prescription::  1900  Safety Criteria Triggered:  None  Toolbox Triggered:  Adherence to diet  Adherence to diet: Health  must choose at least two interventions from list::  Modify eating behavior and meal patterns, Direct dietary modification based on individual needs and Use of motivational interviewing  Weight Graph Stoplight Status for Dietary Adherence:  Red Light       Goals         Grocery shop for meal plan foods (pt-stated)                 Walk 1 mile on treadmill twice a week (pt-stated)              Additional Notes:    Patient reports Doing Well. Patient is Poorly Motivated with being 188.7 pounds on 3/18/24 (most recent recorded weight) and aims to Get Below that weight next week. Patient's plans for eating this week include Avoiding temptation, Packing healthy snacks, and Declining unhealthy options. Shared that she has been stress eating starchy foods like biscuits, dinner rolls, and [place] toast. Feels regret almost instantly after eating it and notices that she usually is not physically hungry and that it is more of a habit  than anything. We came up with several strategies including having water bottles on display in the kitchen, having a fruit bowl, and taking a walk or distracting herself for 30 minutes to see if the urge to snack subsides. She grocery shopped this weekend and got fruit, low sugar oatmeal, and ingredients to make a grilled chicken salad. Focusing on increasing her water intake and hasn't had any cold drinks in the past few weeks. We discussed having some vegetables she likes such as tomatoes and cucumbers on hand as a snack. Has been avoiding weighing herself since weights have trended up recently, and this makes her feel discouraged. We discussed covering up the last two numbers on the scale and the idea of only taking one weight facing forward at the beginning of the week. Suggested she stand backwards for the other 6 days so that she is only seeing how her weight changed over 1 week instead of from day to day. Patient shared that she recently got a new dress that she wants to fit more comfortably. We discussed trying on the dress at the end of each month as a measure of her progress - encouraged her that this could be motivating if the weights aren't reflecting as big of a change as she anticipated. For exercise, patient plans to exercise twice a week for 30-45 mins. Has not been going to the gym recently since her daughter hasn't been going. Will try to utilize her treadmill at home more often.                Giacomo Greer, MS, RD, LDN  [facility]   [phone]

## 2024-04-17 ENCOUNTER — PATIENT OUTREACH (OUTPATIENT)
Dept: RESEARCH | Facility: CLINIC | Age: 49
End: 2024-04-17
Payer: COMMERCIAL

## 2024-04-17 NOTE — PROGRESS NOTES
04/17/2024  1:56 PM    Patient Name Sophia Cesar   Appointment Department [facility] NETTA WEIGHT MANAGEMENT  Visit Type Audio (Virtual visit)    Clinic Weights   Wt Readings from Last 3 Encounters:   02/27/24 1308 86.4 kg (190 lb 7.6 oz)   12/22/23 1624 87.3 kg (192 lb 7.4 oz)   05/26/23 0902 85.4 kg (188 lb 4.4 oz)     Last Reported Weights No data was found      [facility] INTERVENTION CONTACT:   Method of contact:  Phone Call   or Participant-Initiated Contact?:  -initiated contact  Type of intervention Contact:  Scheduled Session  Did the participant attend session?: Yes    Was the patient called within 15 minutes of the scheduled appointment?:  Yes  Is this a make-up session?: No    Which session materials covered in session?:  Session 32  Patient Reported Weight (From External Jadiel):  189.6  Patient-reported weekly minutes of physical activity::  0  Average Daily Steps: not reported.  Calorie Prescription::  1900  Safety Criteria Triggered:  None  Toolbox Triggered:  Physical Activity  Physical Activity::  Add steps to what you are already doing each day.  Weight Graph Stoplight Status for Dietary Adherence:  Red Light       Goals         Grocery shop for meal plan foods (pt-stated)                 Walk 1 mile on treadmill twice a week (pt-stated)              Additional Notes:    Patient reports Doing Well. Patient is Highly Motivated with being 189.6 pounds today and aims to Get Below that weight next week. Patient has been covering the last two numbers on the scale to help with not getting discouraged. When we reviewed her weight trend over the past few weeks, she was pleasantly surprised with her weight being under 190 pounds. Feeling very motivated after hearing that and encouraged patient to give herself more credit for the changes she has made. Her daughter's prom was last weekend, and patient has already noticed a decrease in her stress with this event being done.    Patient's plans for eating  this week include Avoiding temptation, Packing healthy snacks, and Declining unhealthy options. Found it helpful to keep fruit on the counter as a snack and moved the cookie jar somewhere less visible in the kitchen. Also bought 100 calorie snacks to keep at work and bought wheat crackers instead of chips. Increased her water intake as well and feels like she is starting to get adjusted to having more water regularly.  Patient shared a huge win - she grabbed a cold drink walking out the house but did not drink it and put it back when she got home! Feels she is doing a good job managing her portion sizes and even eats off a smaller plate at home. Reviewed veggies and protein first then carbs last     For exercise, patient plans to exercise for at least 30-45 minutes 2 days a week. She also wants to try out 5-10 minute walks outside at work after lunch. Patient reports only making it to the gym 2-3 times last month with the added stress and business of preparing for prom.            Giacomo Greer, MS, RD, LDN  [facility]   [phone]

## 2024-04-23 ENCOUNTER — PATIENT MESSAGE (OUTPATIENT)
Dept: RESEARCH | Facility: CLINIC | Age: 49
End: 2024-04-23
Payer: COMMERCIAL

## 2024-04-30 ENCOUNTER — PATIENT MESSAGE (OUTPATIENT)
Dept: FAMILY MEDICINE | Facility: CLINIC | Age: 49
End: 2024-04-30
Payer: COMMERCIAL

## 2024-06-12 ENCOUNTER — PATIENT OUTREACH (OUTPATIENT)
Dept: RESEARCH | Facility: CLINIC | Age: 49
End: 2024-06-12
Payer: COMMERCIAL

## 2024-06-12 NOTE — PROGRESS NOTES
"06/12/2024  2:14 PM    Patient Name Sophia Cesar   Appointment Department [facility] NETTA WEIGHT MANAGEMENT  Visit Type Audio (Virtual visit)    Clinic Weights   Wt Readings from Last 3 Encounters:   02/27/24 1308 86.4 kg (190 lb 7.6 oz)   12/22/23 1624 87.3 kg (192 lb 7.4 oz)   05/26/23 0902 85.4 kg (188 lb 4.4 oz)     Last Reported Weights No data was found      [facility] INTERVENTION CONTACT:   Method of contact:  Phone Call   or Participant-Initiated Contact?:  -initiated contact  Type of intervention Contact:  Scheduled Session  Did the participant attend session?: Yes    Was the patient called within 15 minutes of the scheduled appointment?:  Yes  Is this a make-up session?: No    Which session materials covered in session?:  Session 33  Patient Reported Weight (From External Jadiel):  196  Time workout: not reported.  Average Daily Steps: not reported.  Calorie Prescription::  1900  Safety Criteria Triggered:  None  Toolbox Triggered:  Adherence to diet and Self-Monitoring  Adherence to diet: Health  must choose at least two interventions from list::  Sole use of a structured meal plan checklist given to ppt in their first intervention session and Use of motivational interviewing  Self monitoring::  Health  and participant to review self-monitoring strategies  Weight Graph Stoplight Status for Dietary Adherence:  Red Light       Goals         Grocery shop for meal plan foods (pt-stated)                 Walk 1 mile on treadmill twice a week (pt-stated)              Additional Notes:    New health  introduction to patient. States that she went to the doctor this morning and has confirmed COVID-19.     She admits before today she has been "off-track" with her diet and exercise routine. Attributes stress as the main contributing factor.     Would like start exercising at the gym again but is unable right now due to COVID. Agrees to emphasize her efforts for healthy eating.     Goals:   1) " "Healthy grocery shopping - "Getting Supplies" to set you up for success!  -- making a list before shopping  -- planning meals for the week  -- grab-n-go snacks to take with you in the car (help to avoid drive-thru)    2) Weighing daily (or at least every other day)    JOANNA Reynoso-[facility]  [facility]   [phone]        "

## 2024-06-14 ENCOUNTER — E-VISIT (OUTPATIENT)
Dept: FAMILY MEDICINE | Facility: CLINIC | Age: 49
End: 2024-06-14
Payer: COMMERCIAL

## 2024-06-14 DIAGNOSIS — J32.9 SINUSITIS, UNSPECIFIED CHRONICITY, UNSPECIFIED LOCATION: ICD-10-CM

## 2024-06-14 DIAGNOSIS — U07.1 COVID: Primary | ICD-10-CM

## 2024-06-14 DIAGNOSIS — J02.9 SORE THROAT: ICD-10-CM

## 2024-06-14 RX ORDER — NAPROXEN 500 MG/1
500 TABLET ORAL 2 TIMES DAILY PRN
Qty: 14 TABLET | Refills: 0 | Status: SHIPPED | OUTPATIENT
Start: 2024-06-14 | End: 2024-06-21

## 2024-06-14 RX ORDER — DESLORATADINE 5 MG/1
5 TABLET ORAL DAILY
Qty: 7 TABLET | Refills: 0 | Status: SHIPPED | OUTPATIENT
Start: 2024-06-14 | End: 2024-06-21

## 2024-06-14 NOTE — PROGRESS NOTES
Patient ID: Sophia Cesar is a 48 y.o. female.    Chief Complaint: URI (Entered automatically based on patient selection in Patient Portal.)          274}  The patient initiated a request through [employer] on 6/14/2024 for evaluation and management with a chief complaint of URI (Entered automatically based on patient selection in Patient Portal.)     I evaluated the questionnaire submission on 06/14/2024 .    Total Time (in minutes): 13     [facility] E-Visit Covid    6/14/2024  8:23 AM CDT - Filed by Patient   Do you agree to participate in an E-Visit? Yes   If you have any of the following symptoms, go to your local emergency room or call 911: I acknowledge   Are you pregnant, could you be pregnant, or are you breast feeding? None of the above   What is the main issue you would like addressed today? Severe sore throat/covid   Do you think you might have COVID or the Flu? Yes COVID   Have you tested positive for COVID or Flu? Yes COVID   What symptoms do you have? Diarrhea;  Fatigue;  Nasal congestion;  Runny nose;  Sore throat   When did your symptoms first appear? 6/11/2024   List what you have done or taken to help your symptoms. Gargled with salt water, musinex nasal spray   Provide any additional information you feel is important. I have Covid ( tested on Wednesday) but the throat is on fire and nothing is helping it i just want to make sure its just covid and nothing else   Please attach any relevant images or files File not available.   Are you able to take your vital signs? Yes   Systolic Blood Pressure:    Diastolic Blood Pressure:    Weight:    Height: 195   Pulse:    Temperature: 98   Respiration rate:    Pulse Oxygen:           Active Problem List with Overview Notes    Diagnosis Date Noted    Diabetic cataract 02/27/2024    Benign colon polyp 02/27/2024    Colon cancer screening 05/26/2023    Research subject 03/02/2023    Surgical menopause 05/26/2022    Obesity (BMI 30.0-34.9) 11/18/2020    Controlled  type 2 diabetes mellitus without complication, without long-term current use of insulin 05/21/2020    On angiotensin-converting enzyme (ACE) inhibitors 10/25/2018    On statin therapy due to risk of future cardiovascular event 10/25/2018    Vitamin D deficiency 07/07/2016    TMJ (dislocation of temporomandibular joint) 09/29/2015    Anxiety and depression 02/21/2014      Recent Labs Obtained:  Lab Results   Component Value Date    WBC 5.7 02/21/2023    HGB 12.3 02/21/2023    HCT 36.5 02/21/2023    MCV 92 02/21/2023     02/21/2023     02/16/2024    K 4.1 02/16/2024     (H) 02/16/2024    CREATININE 0.8 02/16/2024    EGFRNORACEVR 92 02/16/2024    HGBA1C 5.4 02/16/2024      Review of patient's allergies indicates:  No Known Allergies    Encounter Diagnoses   Name Primary?    COVID Yes    Sinusitis, unspecified chronicity, unspecified location     Sore throat         No orders of the defined types were placed in this encounter.     Medications Ordered This Encounter   Medications    desloratadine (CLARINEX) 5 mg tablet     Sig: Take 1 tablet (5 mg total) by mouth once daily. for 7 days     Dispense:  7 tablet     Refill:  0    naproxen (NAPROSYN) 500 MG tablet     Sig: Take 1 tablet (500 mg total) by mouth 2 (two) times daily as needed (pain).     Dispense:  14 tablet     Refill:  0    nirmatrelvir-ritonavir 300 mg (150 mg x 2)-100 mg copackaged tablets (EUA)     Sig: Take 3 tablets by mouth 2 (two) times daily for 5 days. Each dose contains 2 nirmatrelvir (pink tablets) and 1 ritonavir (white tablet). Take all 3 tablets together     Dispense:  30 tablet     Refill:  0        E-Visit Time Tracking:    Day 1 Time (in minutes): 13    Total Time (in minutes): 13      274}

## 2024-07-08 ENCOUNTER — HOSPITAL ENCOUNTER (OUTPATIENT)
Dept: RADIOLOGY | Facility: HOSPITAL | Age: 49
Discharge: HOME | End: 2024-07-08
Attending: FAMILY MEDICINE
Payer: COMMERCIAL

## 2024-07-08 ENCOUNTER — OFFICE VISIT (OUTPATIENT)
Dept: OPHTHALMOLOGY | Facility: CLINIC | Age: 49
End: 2024-07-08
Attending: FAMILY MEDICINE
Payer: COMMERCIAL

## 2024-07-08 DIAGNOSIS — H52.4 MYOPIA WITH ASTIGMATISM AND PRESBYOPIA, BILATERAL: ICD-10-CM

## 2024-07-08 DIAGNOSIS — E11.36 DIABETIC CATARACT: ICD-10-CM

## 2024-07-08 DIAGNOSIS — Z12.31 OTHER SCREENING MAMMOGRAM: ICD-10-CM

## 2024-07-08 DIAGNOSIS — H52.203 MYOPIA WITH ASTIGMATISM AND PRESBYOPIA, BILATERAL: ICD-10-CM

## 2024-07-08 DIAGNOSIS — E11.9 DIABETES MELLITUS WITHOUT COMPLICATION: Primary | ICD-10-CM

## 2024-07-08 DIAGNOSIS — E11.9 CONTROLLED TYPE 2 DIABETES MELLITUS WITHOUT COMPLICATION, WITHOUT LONG-TERM CURRENT USE OF INSULIN: ICD-10-CM

## 2024-07-08 DIAGNOSIS — H25.13 NUCLEAR SCLEROSIS OF BOTH EYES: ICD-10-CM

## 2024-07-08 DIAGNOSIS — H52.13 MYOPIA WITH ASTIGMATISM AND PRESBYOPIA, BILATERAL: ICD-10-CM

## 2024-07-08 PROCEDURE — 77067 SCR MAMMO BI INCL CAD: CPT | Mod: 26,,, | Performed by: RADIOLOGY

## 2024-07-08 PROCEDURE — 77067 SCR MAMMO BI INCL CAD: CPT | Mod: TC

## 2024-07-08 PROCEDURE — 2023F DILAT RTA XM W/O RTNOPTHY: CPT | Mod: CPTII,S$GLB,, | Performed by: OPTOMETRIST

## 2024-07-08 PROCEDURE — 99999 PR PBB SHADOW E&M-EST. PATIENT-LVL III: CPT | Mod: PBBFAC,,, | Performed by: OPTOMETRIST

## 2024-07-08 PROCEDURE — 77063 BREAST TOMOSYNTHESIS BI: CPT | Mod: 26,,, | Performed by: RADIOLOGY

## 2024-07-08 PROCEDURE — 1159F MED LIST DOCD IN RCRD: CPT | Mod: CPTII,S$GLB,, | Performed by: OPTOMETRIST

## 2024-07-08 PROCEDURE — 92014 COMPRE OPH EXAM EST PT 1/>: CPT | Mod: S$GLB,,, | Performed by: OPTOMETRIST

## 2024-07-08 PROCEDURE — 4010F ACE/ARB THERAPY RXD/TAKEN: CPT | Mod: CPTII,S$GLB,, | Performed by: OPTOMETRIST

## 2024-07-08 PROCEDURE — 3044F HG A1C LEVEL LT 7.0%: CPT | Mod: CPTII,S$GLB,, | Performed by: OPTOMETRIST

## 2024-07-08 PROCEDURE — 92015 DETERMINE REFRACTIVE STATE: CPT | Mod: S$GLB,,, | Performed by: OPTOMETRIST

## 2024-07-08 NOTE — PROGRESS NOTES
HPI     Diabetic Eye Exam            Comments: Patient here today for yearly DM eye exam          Comments    Diagnosed with diabetes in 2019  Lab Results       Component                Value               Date                       HGBA1C                   5.4                 02/16/2024            Vision changes since last eye exam?: None noticed  Wears PAL glasses full-time     Any eye pain today: No    Other ocular symptoms: No    Interested in contact lens fitting today? No                      Last edited by Gaby Cotto, PCT on 7/8/2024  8:19 AM.            Assessment /Plan     For exam results, see Encounter Report.    1. Diabetes mellitus without complication  5 years, last A1c 5.4 There was no diabetic retinopathy present on either eye on examination today. Recommend good blood pressure control, strict blood glucose control, and good cholesterol control.  Continue close care with Dr. Elkins regarding diabetes.    2. Diabetic cataract  Nuclear sclerosis of both eyes  Cataracts are not visually significant and not affecting activities of daily living. Annual observation is recommended at this time. Patient to call or return to clinic with any significant change in vision prior to next visit.    3. Myopia with astigmatism and presbyopia, bilateral  Eyeglass Final Rx       Eyeglass Final Rx         Sphere Cylinder Axis Add    Right -1.00 +2.75 074 +1.50    Left -0.75 +1.25 115 +1.50      Type: PAL    Expiration Date: 7/8/2025                    RTC 1 yr for dilated eye exam or sooner if any changes to vision.   Discussed above and answered questions.

## 2024-07-17 ENCOUNTER — PATIENT OUTREACH (OUTPATIENT)
Dept: RESEARCH | Facility: CLINIC | Age: 49
End: 2024-07-17
Payer: COMMERCIAL

## 2024-07-17 NOTE — PROGRESS NOTES
07/17/2024  2:17 PM    Patient Name Sophia Cesar   Appointment Department [facility] PROP WEIGHT MANAGEMENT  Visit Type Audio (Virtual visit)    Clinic Weights   Wt Readings from Last 3 Encounters:   02/27/24 1308 86.4 kg (190 lb 7.6 oz)   12/22/23 1624 87.3 kg (192 lb 7.4 oz)   05/26/23 0902 85.4 kg (188 lb 4.4 oz)     Last Reported Weights No data was found      [facility] INTERVENTION CONTACT:   Method of contact:  Phone Call   or Participant-Initiated Contact?:  -initiated contact  Type of intervention Contact:  Scheduled Session  Did the participant attend session?: Yes    Was the patient called within 15 minutes of the scheduled appointment?:  Yes  Is this a make-up session?: No    Which session materials covered in session?:  Session 34  Patient Reported Weight (From External Jadiel):  197.5  Time workout: not reported.  Average Daily Steps: not reported.  Calorie Prescription::  1900  Safety Criteria Triggered:  None  Toolbox Triggered:  Adherence to diet  Adherence to diet: Health  must choose at least two interventions from list::  Problem solving with SMART goals and Use of motivational interviewing  Weight Graph Stoplight Status for Dietary Adherence:  Red Light       Goals         Grocery shop for meal plan foods (pt-stated)                 Walk 1 mile on treadmill twice a week (pt-stated)              Additional Notes:    Patient reports doing well overall. Has totally recovered from the COVID she had last month. Is very pleased with her weight loss over the last month. She feels encouraged by this progress and plans to continue on this path.       Goals:   1) Exercising at the gym - 3 days for 60 minutes  2) Weighing daily (or at least every other day) for accountability  3) Continue to follow 1,900 calorie plan    Clau Farias [facility]  [facility]   [phone]

## 2024-08-19 ENCOUNTER — TELEPHONE (OUTPATIENT)
Dept: FAMILY MEDICINE | Facility: CLINIC | Age: 49
End: 2024-08-19
Payer: COMMERCIAL

## 2024-08-19 NOTE — TELEPHONE ENCOUNTER
Pt stated she doesn't need to be to be seen for this appt. Also, she is requesting lab orders be entered in the system for her to have done at [facility] [address].

## 2024-08-19 NOTE — TELEPHONE ENCOUNTER
----- Message from Sole Carpenter sent at 8/19/2024  1:24 PM CDT -----  Contact: [phone]  Patient is returning a phone call.    Who left a message for the patient:     Does patient know what this is regarding:  missed call re rs bookout. Pt wants to know if she needs to be seen for this appt. Also, she is requesting lab orders be entered in the system for her to have done at [facility] [address].     Would you like a call back, or a response through your [facility] portal?:   call    Comments:

## 2024-08-20 NOTE — TELEPHONE ENCOUNTER
I have never seen this patient. Please refer to Kisha to schedule her an appointment with Dr. Elkins

## 2024-08-21 ENCOUNTER — PATIENT MESSAGE (OUTPATIENT)
Dept: FAMILY MEDICINE | Facility: CLINIC | Age: 49
End: 2024-08-21
Payer: COMMERCIAL

## 2024-08-21 NOTE — TELEPHONE ENCOUNTER
Asked pt if she wanted wanted to r/s her appt with isaías from 8/27 to see you and pt declined and said she would send you a message.     Also advised pt that she had a physical with you for this year and attempted to assist pt in scheduling a physical for 2025. Pt also declined. Msg below.     At my last visit dr shrestha scheduled me to see dr metz but I understand she is leaving and the appt is canceled. The appointment reason was to get 6 month lab work just to see where I am I think i dont think I need to see the dr but would like to get my lab work done just to see my status      I would like to include Vitamin D  just in case she was only checking the A1C

## 2024-08-21 NOTE — TELEPHONE ENCOUNTER
Reached out to pt via telephone to assist in r/s appt with Nilam Mcgarry on 8/27. Advised pt that she had a physical with Dr. Elkins for 2024. Asked pt if she wanted to schedule her physical as well as r/s next appt with Dr. Elkins. Pt declined and said that she wants her 8/27 appt cancelled and she will msg Dr. Elkins.

## 2024-09-18 ENCOUNTER — PATIENT OUTREACH (OUTPATIENT)
Dept: RESEARCH | Facility: CLINIC | Age: 49
End: 2024-09-18
Payer: COMMERCIAL

## 2024-09-18 NOTE — PROGRESS NOTES
"09/18/2024  2:24 PM    Patient Name Sophia Cesar   Appointment Department [facility] PROP WEIGHT MANAGEMENT  Visit Type Audio (Virtual visit)    Clinic Weights   Wt Readings from Last 3 Encounters:   02/27/24 1308 86.4 kg (190 lb 7.6 oz)   12/22/23 1624 87.3 kg (192 lb 7.4 oz)   05/26/23 0902 85.4 kg (188 lb 4.4 oz)     Last Reported Weights No data was found      [facility] INTERVENTION CONTACT:   Method of contact:  Phone Call   or Participant-Initiated Contact?:  -initiated contact  Type of intervention Contact:  Scheduled Session  Did the participant attend session?: Yes    Was the patient called within 15 minutes of the scheduled appointment?:  Yes  Is this a make-up session?: No    Which session materials covered in session?:  Session 35  Patient Reported Weight (From External Jadiel):  205  Time workout: not reported.  Average Daily Steps: not reported.  Calorie Prescription::  1900  Safety Criteria Triggered:  None  Toolbox Triggered:  Adherence to diet  Adherence to diet: Health  must choose at least two interventions from list::  Use of motivational interviewing and Problem solving with SMART goals  Weight Graph Stoplight Status for Dietary Adherence:  Red Light       Goals         Grocery shop for meal plan foods (pt-stated)                 Walk 1 mile on treadmill twice a week (pt-stated)              Additional Notes:    Patient expressed feeling discouraged at her weight fluctuations over the last few months. 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 She also has had some trouble waking and eating during the night.     She described herself as a "procrastinator" which we discussed re-framing to "Prioritization."     Goal:   1) Make a grocery list for healthy meals and snacks     This is a great goal especially since you are now working from home and at home all day.    Clau Farias, [facility]  [facility] "   [phone]

## 2024-10-16 ENCOUNTER — PATIENT OUTREACH (OUTPATIENT)
Dept: RESEARCH | Facility: CLINIC | Age: 49
End: 2024-10-16
Payer: COMMERCIAL

## 2024-10-16 NOTE — PROGRESS NOTES
10/16/2024  2:29 PM    Patient Name Sophia Cesar   Appointment Department [facility] PROP WEIGHT MANAGEMENT  Visit Type Audio (Virtual visit)    Clinic Weights   Wt Readings from Last 3 Encounters:   02/27/24 1308 86.4 kg (190 lb 7.6 oz)   12/22/23 1624 87.3 kg (192 lb 7.4 oz)   05/26/23 0902 85.4 kg (188 lb 4.4 oz)     Last Reported Weights No data was found      [facility] INTERVENTION CONTACT:   Method of contact:  Phone Call   or Participant-Initiated Contact?:  -initiated contact  Type of intervention Contact:  Scheduled Session  Did the participant attend session?: No    If no, please select a reason::  Other (please comment) (unknown)  Safety Criteria Triggered:  None  Weight Graph Stoplight Status for Dietary Adherence:  Red Light       Goals         Grocery shop for meal plan foods (pt-stated)                 Walk 1 mile on treadmill twice a week (pt-stated)              Additional Notes:    Attempted to reach patient for monthly session. Patient did not attend reason unknown.     JOANNA [facility]  [facility]   [phone]

## 2024-11-14 ENCOUNTER — PATIENT MESSAGE (OUTPATIENT)
Dept: FAMILY MEDICINE | Facility: CLINIC | Age: 49
End: 2024-11-14
Payer: COMMERCIAL

## 2024-11-14 ENCOUNTER — TELEPHONE (OUTPATIENT)
Dept: FAMILY MEDICINE | Facility: CLINIC | Age: 49
End: 2024-11-14
Payer: COMMERCIAL

## 2024-11-14 DIAGNOSIS — E11.9 CONTROLLED TYPE 2 DIABETES MELLITUS WITHOUT COMPLICATION, WITHOUT LONG-TERM CURRENT USE OF INSULIN: Primary | ICD-10-CM

## 2024-11-14 NOTE — TELEPHONE ENCOUNTER
I have put the following orders and/or medications to this note.  However, due for diabetes f/u appt w/me now. Please advise pt and assist.    Orders Placed This Encounter   Procedures    Hemoglobin A1C     Standing Status:   Future     Standing Expiration Date:   1/13/2026     Order Specific Question:   Send normal result to authorizing provider's In Basket if patient is active on MyChart:     Answer:   Yes

## 2024-11-15 LAB — HBA1C MFR BLD: 6 % (ref 4.8–5.6)

## 2024-11-18 ENCOUNTER — OFFICE VISIT (OUTPATIENT)
Dept: FAMILY MEDICINE | Facility: CLINIC | Age: 49
End: 2024-11-18
Attending: FAMILY MEDICINE
Payer: COMMERCIAL

## 2024-11-18 VITALS
BODY MASS INDEX: 39.52 KG/M2 | TEMPERATURE: 98 F | WEIGHT: 214.75 LBS | RESPIRATION RATE: 18 BRPM | OXYGEN SATURATION: 99 % | SYSTOLIC BLOOD PRESSURE: 122 MMHG | DIASTOLIC BLOOD PRESSURE: 78 MMHG | HEIGHT: 62 IN | HEART RATE: 98 BPM

## 2024-11-18 DIAGNOSIS — Z79.899 ON ANGIOTENSIN-CONVERTING ENZYME (ACE) INHIBITORS: ICD-10-CM

## 2024-11-18 DIAGNOSIS — Z79.899 ON STATIN THERAPY DUE TO RISK OF FUTURE CARDIOVASCULAR EVENT: ICD-10-CM

## 2024-11-18 DIAGNOSIS — E66.01 SEVERE OBESITY (BMI 35.0-39.9) WITH COMORBIDITY: ICD-10-CM

## 2024-11-18 DIAGNOSIS — E89.40 SURGICAL MENOPAUSE: ICD-10-CM

## 2024-11-18 DIAGNOSIS — N89.8 VAGINAL DISCHARGE: ICD-10-CM

## 2024-11-18 DIAGNOSIS — E11.9 CONTROLLED TYPE 2 DIABETES MELLITUS WITHOUT COMPLICATION, WITHOUT LONG-TERM CURRENT USE OF INSULIN: ICD-10-CM

## 2024-11-18 DIAGNOSIS — Z23 NEED FOR VACCINATION: ICD-10-CM

## 2024-11-18 PROCEDURE — 3074F SYST BP LT 130 MM HG: CPT | Mod: CPTII,S$GLB,, | Performed by: FAMILY MEDICINE

## 2024-11-18 PROCEDURE — 4010F ACE/ARB THERAPY RXD/TAKEN: CPT | Mod: CPTII,S$GLB,, | Performed by: FAMILY MEDICINE

## 2024-11-18 PROCEDURE — 3044F HG A1C LEVEL LT 7.0%: CPT | Mod: CPTII,S$GLB,, | Performed by: FAMILY MEDICINE

## 2024-11-18 PROCEDURE — 99999 PR PBB SHADOW E&M-EST. PATIENT-LVL IV: CPT | Mod: PBBFAC,,, | Performed by: FAMILY MEDICINE

## 2024-11-18 PROCEDURE — 1159F MED LIST DOCD IN RCRD: CPT | Mod: CPTII,S$GLB,, | Performed by: FAMILY MEDICINE

## 2024-11-18 PROCEDURE — 1160F RVW MEDS BY RX/DR IN RCRD: CPT | Mod: CPTII,S$GLB,, | Performed by: FAMILY MEDICINE

## 2024-11-18 PROCEDURE — 90471 IMMUNIZATION ADMIN: CPT | Mod: S$GLB,,, | Performed by: FAMILY MEDICINE

## 2024-11-18 PROCEDURE — 99214 OFFICE O/P EST MOD 30 MIN: CPT | Mod: 25,S$GLB,, | Performed by: FAMILY MEDICINE

## 2024-11-18 PROCEDURE — 3078F DIAST BP <80 MM HG: CPT | Mod: CPTII,S$GLB,, | Performed by: FAMILY MEDICINE

## 2024-11-18 PROCEDURE — 3008F BODY MASS INDEX DOCD: CPT | Mod: CPTII,S$GLB,, | Performed by: FAMILY MEDICINE

## 2024-11-18 PROCEDURE — 90656 IIV3 VACC NO PRSV 0.5 ML IM: CPT | Mod: S$GLB,,, | Performed by: FAMILY MEDICINE

## 2024-11-18 PROCEDURE — 87491 CHLMYD TRACH DNA AMP PROBE: CPT | Performed by: FAMILY MEDICINE

## 2024-11-18 PROCEDURE — 0352U VAGINOSIS SCREEN BY DNA PROBE: CPT | Performed by: FAMILY MEDICINE

## 2024-11-18 RX ORDER — INSULIN PUMP SYRINGE, 3 ML
EACH MISCELLANEOUS
Qty: 1 EACH | Refills: 0 | Status: SHIPPED | OUTPATIENT
Start: 2024-11-18 | End: 2024-11-19 | Stop reason: SDUPTHER

## 2024-11-18 RX ORDER — LANCETS
EACH MISCELLANEOUS
Qty: 100 EACH | Refills: 1 | Status: SHIPPED | OUTPATIENT
Start: 2024-11-18 | End: 2024-11-19 | Stop reason: SDUPTHER

## 2024-11-18 RX ORDER — SEMAGLUTIDE 1.34 MG/ML
1 INJECTION, SOLUTION SUBCUTANEOUS
Qty: 3 ML | Refills: 5 | Status: SHIPPED | OUTPATIENT
Start: 2024-11-18

## 2024-11-18 NOTE — PROGRESS NOTES
Sophia EDITH Cesar    Chief Complaint   Patient presents with    discuss labs    Gynecologic Exam       History of Present Illness:   Ms. Cesar comes in today for diabetes follow-up.  She states she is not fasting but has taken medication today.  She states she continues to take Ozempic 0.5 mg weekly and metformin 500 mg daily for diabetes; pravastatin 10 mg nightly for statin therapy due to diabetes; and lisinopril 5 mg daily for diabetic renal protection.  However, she states she desires to titrate dose of Ozempic to help her lose weight.  She states she monitors her diet but does not exercise.  She states she performs home glucose checks fasting with levels ranging 100-130's.    She complains of having vaginal discharge with external itching for few weeks.  She states she is sexually active with 1 partner but does not use barrier protection.  She states she does not does.  She states she is status post hysterectomy.    Otherwise, she denies having fever, chills, fatigue, appetite changes; shortness of breath, cough, wheezing; chest pain, palpitations, leg swelling; abdominal pain, nausea, vomiting, diarrhea, constipation;other  unusual urinary symptoms; polydipsia, polyphagia, polyuria, hot or cold intolerance; back pain; acute visual changes, numbness, headache; anxiety, depression, homicidal or suicidal thoughts.           Labs:                     WBC                      5.7                 02/21/2023                 HGB                      12.3                02/21/2023                 HCT                      36.5                02/21/2023                 PLT                      336                 02/21/2023                 CHOL                     170                 02/21/2023                 TRIG                     47                  02/16/2024                 HDL                      52                  02/16/2024                 ALT                      11                  02/16/2024                  AST                      16                  02/16/2024                 NA                       139                 02/16/2024                 K                        4.1                 02/16/2024                 CL                       103                 02/16/2024                 CREATININE               0.8                 02/16/2024                 BUN                      11                  02/16/2024                 CO2                      25                  02/16/2024                 TSH                      3.270               02/21/2023                 HGBA1C                   5.4                 02/16/2024                 MICROALBUR               <3.0                02/21/2023           LDLCALC                  103 (H)             02/16/2024           HGBA1C                  6.0                     11/15/2024              Current Outpatient Medications   Medication Sig    acyclovir (ZOVIRAX) 400 MG tablet TAKE 1 TABLET(400 MG) BY MOUTH TWICE DAILY    lisinopriL (PRINIVIL,ZESTRIL) 5 MG tablet Take 1 tablet (5 mg total) by mouth once daily.    metFORMIN (GLUCOPHAGE) 500 MG tablet Take 1 tablet (500 mg total) by mouth daily with breakfast.    pravastatin (PRAVACHOL) 10 MG tablet Take 1 tablet (10 mg total) by mouth every evening.    semaglutide (OZEMPIC) 0.25 mg or 0.5 mg(2 mg/1.5 mL) pen injector Inject 0.5 mg into the skin every 7 days.     Review of Systems   Constitutional:  Positive for unexpected weight change. Negative for activity change.        Weight 86.4 kg (190 lb 7.6 oz) at February 27, 2024 visit.   HENT:  Negative for hearing loss, rhinorrhea and trouble swallowing.    Eyes:  Negative for discharge and visual disturbance.   Respiratory:  Negative for chest tightness and wheezing.    Cardiovascular:  Negative for chest pain and palpitations.   Gastrointestinal:  Negative for blood in stool, constipation, diarrhea and vomiting.   Endocrine: Negative for polydipsia and polyuria.    Genitourinary:  Negative for difficulty urinating, dysuria, hematuria and menstrual problem.   Musculoskeletal:  Negative for arthralgias, joint swelling and neck pain.   Neurological:  Negative for weakness and headaches.   Psychiatric/Behavioral:  Negative for confusion and dysphoric mood.        Objective:  Physical Exam  Vitals reviewed.   Constitutional:       General: She is not in acute distress.     Appearance: Normal appearance. She is obese. She is not ill-appearing, toxic-appearing or diaphoretic.      Comments: Pleasant.   Cardiovascular:      Rate and Rhythm: Normal rate and regular rhythm.      Pulses:           Dorsalis pedis pulses are 3+ on the right side and 3+ on the left side.      Heart sounds: No murmur heard.  Pulmonary:      Effort: Pulmonary effort is normal. No respiratory distress.      Breath sounds: Normal breath sounds. No wheezing.   Abdominal:      General: Bowel sounds are normal. There is no distension.      Palpations: Abdomen is soft. There is no mass.      Tenderness: There is no abdominal tenderness. There is no guarding or rebound.      Hernia: There is no hernia in the left inguinal area or right inguinal area.   Genitourinary:     General: Normal vulva.      Labia:         Right: No lesion.         Left: No lesion.       Urethra: No prolapse, urethral pain, urethral swelling or urethral lesion.      Vagina: No foreign body. Vaginal discharge present.      Cervix: No lesion.      Uterus: Not enlarged and not tender.       Adnexa:         Right: No mass, tenderness or fullness.          Left: No mass, tenderness or fullness.        Comments: Scant white.  Musculoskeletal:         General: No swelling or tenderness. Normal range of motion.      Cervical back: Normal range of motion and neck supple. No tenderness.      Comments: She is ambulatory without problems.   Feet:      Right foot:      Protective Sensation: 5 sites tested.  5 sites sensed.      Skin integrity: No ulcer  or skin breakdown.      Left foot:      Protective Sensation: 5 sites tested.  5 sites sensed.      Skin integrity: No ulcer or skin breakdown.   Lymphadenopathy:      Cervical: No cervical adenopathy.      Lower Body: No right inguinal adenopathy. No left inguinal adenopathy.   Skin:     General: Skin is warm.   Neurological:      General: No focal deficit present.      Mental Status: She is alert and oriented to person, place, and time.   Psychiatric:         Mood and Affect: Mood normal.         Behavior: Behavior normal.         Thought Content: Thought content normal.         Judgment: Judgment normal.       ASSESSMENT:  1. Controlled type 2 diabetes mellitus without complication, without long-term current use of insulin    2. On statin therapy due to risk of future cardiovascular event    3. On angiotensin-converting enzyme (ACE) inhibitors    4. Vaginal discharge    5. Severe obesity (BMI 35.0-39.9) with comorbidity    6. Surgical menopause    7. Need for vaccination        PLAN:  Sophia was seen today for discuss labs and gynecologic exam.    Diagnoses and all orders for this visit:    Controlled type 2 diabetes mellitus without complication, without long-term current use of insulin  -     blood-glucose meter kit; To check BG 1 times daily, to use with insurance preferred meter  -     lancets Misc; To check BG 1 times daily, to use with insurance preferred meter  -     blood sugar diagnostic Strp; To check BG 1 times daily, to use with insurance preferred meter  -     semaglutide (OZEMPIC) 1 mg/dose (4 mg/3 mL); Inject 1 mg into the skin every 7 days.    On statin therapy due to risk of future cardiovascular event    On angiotensin-converting enzyme (ACE) inhibitors    Vaginal discharge  -     C. trachomatis/N. gonorrhoeae by AMP DNA  -     Vaginosis Screen by DNA Probe    Severe obesity (BMI 35.0-39.9) with comorbidity    Surgical menopause    Need for vaccination  -     Influenza - Trivalent - PF  (ADULT)    Patient advised to call for results.  Continue current medications, follow low sodium, low cholesterol, low carb diet, daily walks.  Increase Ozempic 0.5 mg weekly to 1 mg weekly; medication precautions discussed with patient.  Rxs for diabetes supplies.  Keep follow up with specialists.  Follow up in about 3 months (around 2/27/2025) for physical.

## 2024-11-19 DIAGNOSIS — E11.9 CONTROLLED TYPE 2 DIABETES MELLITUS WITHOUT COMPLICATION, WITHOUT LONG-TERM CURRENT USE OF INSULIN: ICD-10-CM

## 2024-11-19 RX ORDER — LANCETS
EACH MISCELLANEOUS
Qty: 100 EACH | Refills: 1 | Status: SHIPPED | OUTPATIENT
Start: 2024-11-19 | End: 2024-11-20 | Stop reason: SDUPTHER

## 2024-11-19 RX ORDER — INSULIN PUMP SYRINGE, 3 ML
EACH MISCELLANEOUS
Qty: 1 EACH | Refills: 0 | Status: SHIPPED | OUTPATIENT
Start: 2024-11-19

## 2024-11-20 ENCOUNTER — PATIENT OUTREACH (OUTPATIENT)
Dept: RESEARCH | Facility: CLINIC | Age: 49
End: 2024-11-20
Payer: COMMERCIAL

## 2024-11-20 DIAGNOSIS — E11.9 CONTROLLED TYPE 2 DIABETES MELLITUS WITHOUT COMPLICATION, WITHOUT LONG-TERM CURRENT USE OF INSULIN: ICD-10-CM

## 2024-11-20 LAB
BACTERIAL VAGINOSIS DNA: DETECTED
C TRACH DNA SPEC QL NAA+PROBE: NOT DETECTED
CANDIDA GLABRATA/KRUSEI: NOT DETECTED
CANDIDA RRNA VAG QL PROBE: NOT DETECTED
N GONORRHOEA DNA SPEC QL NAA+PROBE: NOT DETECTED
TRICHOMONAS VAGINALIS: NOT DETECTED

## 2024-11-20 RX ORDER — LANCETS
EACH MISCELLANEOUS
Qty: 100 EACH | Refills: 1 | Status: SHIPPED | OUTPATIENT
Start: 2024-11-20

## 2024-11-20 NOTE — TELEPHONE ENCOUNTER
Care Due:                  Date            Visit Type   Department     Provider  --------------------------------------------------------------------------------                                MYCHART                              FOLLOWUP/OF  [facility]  Last Visit: 11-      FICE VISIT   MEDICINE       Barbi Elkins                              EP -                              PRIMARY      [facility]  Next Visit: 02-      CARE ([facility])   MEDICINE       Barbi Elkins                                                            Last  Test          Frequency    Reason                     Performed    Due Date  --------------------------------------------------------------------------------    CMP.........  12 months..  lisinopriL, metFORMIN,     02-   02-                             pravastatin..............    HBA1C.......  6 months...  metFORMIN, semaglutide...  02- 08-    Lipid Panel.  12 months..  pravastatin..............  02-   02-    [facility] Embedded Care Due Messages. Reference number: [id number].   11/20/2024 10:35:38 AM CST

## 2024-11-20 NOTE — PROGRESS NOTES
"11/20/2024  2:59 PM    Patient Name Sophia Cesar   Appointment Department [facility] NETTA WEIGHT MANAGEMENT  Visit Type Audio (Virtual visit)    Clinic Weights   Wt Readings from Last 3 Encounters:   11/18/24 1517 97.4 kg (214 lb 11.7 oz)   02/27/24 1308 86.4 kg (190 lb 7.6 oz)   12/22/23 1624 87.3 kg (192 lb 7.4 oz)     Last Reported Weights No data was found      [facility] INTERVENTION CONTACT:   Method of contact:  Phone Call   or Participant-Initiated Contact?:  -initiated contact  Type of intervention Contact:  Scheduled Session  Did the participant attend session?: Yes    Was the patient called within 15 minutes of the scheduled appointment?:  Yes  Patient Reported Weight (From External Jadiel):  212  Time workout: not reported.  Average Daily Steps: not reported.  Calorie Prescription::  1900  Safety Criteria Triggered:  None  Toolbox Triggered:  Adherence to diet  Adherence to diet: Health  must choose at least two interventions from list::  Recommend additional equipment (measuring cups, food scales, smartphone apps, etc.) for estimating portion size and caloric content and Use of motivational interviewing  Weight Graph Stoplight Status for Dietary Adherence:  Red Light       Goals         Grocery shop for meal plan foods (pt-stated)                 Walk 1 mile on treadmill twice a week (pt-stated)              Additional Notes:    Patient reports having a challenging past two months due to stress (family surgery & death).     Agrees to change her mindset toward being positive about her weight loss journey. Encouraged her to view the scale as "feedback" rather than a goal to attain.     She also admits that she is not following her meal plan checklist or counting calories. Encouraged her to start tracking calories to evaluate her intake. Agrees to start tracking her food in a notes jadiel in her phone.     Discussed why she wants to make exercise a goal but does not follow through with it. " Encouraged her to think about what would make her excited to exercise. She enjoys the feeling afterward but does struggle with follow through and motivation to exercise at the gym.     Goals:   1) Exercising - aim for 150 minutes of exercise/week  2) Weighing every day, using the scale for feedback and accountability  3) Commit to tracking your food in the notes terrence on your phone    JOANNA Reynoso-[facility]  [facility]   [phone]

## 2024-11-26 ENCOUNTER — TELEPHONE (OUTPATIENT)
Dept: FAMILY MEDICINE | Facility: CLINIC | Age: 49
End: 2024-11-26
Payer: COMMERCIAL

## 2024-11-26 ENCOUNTER — PATIENT MESSAGE (OUTPATIENT)
Dept: FAMILY MEDICINE | Facility: CLINIC | Age: 49
End: 2024-11-26
Payer: COMMERCIAL

## 2024-11-26 DIAGNOSIS — B96.89 BV (BACTERIAL VAGINOSIS): Primary | ICD-10-CM

## 2024-11-26 DIAGNOSIS — N76.0 BV (BACTERIAL VAGINOSIS): Primary | ICD-10-CM

## 2024-11-26 RX ORDER — METRONIDAZOLE 500 MG/1
500 TABLET ORAL EVERY 12 HOURS
Qty: 14 TABLET | Refills: 0 | Status: SHIPPED | OUTPATIENT
Start: 2024-11-26 | End: 2024-12-03

## 2024-11-26 NOTE — TELEPHONE ENCOUNTER
Advise pt lab results show she has BV. Rx sent to pharmacy for treatment. Thanks for call.    I have put the following orders and/or medications to this note.       No orders of the defined types were placed in this encounter.      Medications Ordered This Encounter   Medications    metroNIDAZOLE (FLAGYL) 500 MG tablet     Sig: Take 1 tablet (500 mg total) by mouth every 12 (twelve) hours. No alcohol with medication. for 7 days     Dispense:  14 tablet     Refill:  0

## 2024-12-09 ENCOUNTER — PATIENT OUTREACH (OUTPATIENT)
Dept: ADMINISTRATIVE | Facility: HOSPITAL | Age: 49
End: 2024-12-09
Payer: COMMERCIAL

## 2024-12-11 ENCOUNTER — PATIENT OUTREACH (OUTPATIENT)
Dept: RESEARCH | Facility: CLINIC | Age: 49
End: 2024-12-11
Payer: COMMERCIAL

## 2024-12-11 NOTE — PROGRESS NOTES
12/11/2024  3:53 PM    Patient Name Sophia Cesar   Appointment Department [facility] PROP WEIGHT MANAGEMENT  Visit Type Audio (Virtual visit)    Clinic Weights   Wt Readings from Last 3 Encounters:   11/18/24 1517 97.4 kg (214 lb 11.7 oz)   02/27/24 1308 86.4 kg (190 lb 7.6 oz)   12/22/23 1624 87.3 kg (192 lb 7.4 oz)     Last Reported Weights No data was found      [facility] INTERVENTION CONTACT:   Method of contact:  Phone Call   or Participant-Initiated Contact?:  -initiated contact  Type of intervention Contact:  Scheduled Session  Did the participant attend session?: No    If no, please select a reason::  Other (please comment) (Unknown)  Safety Criteria Triggered:  None  Weight Graph Stoplight Status for Dietary Adherence:  Red Light       Goals         Grocery shop for meal plan foods (pt-stated)                 Walk 1 mile on treadmill twice a week (pt-stated)              Additional Notes:    Attempted to reach patient for session. Patient did not attend reason unknown.    Clau Farias [facility]  [facility]   [phone]

## 2024-12-17 ENCOUNTER — PATIENT OUTREACH (OUTPATIENT)
Dept: RESEARCH | Facility: CLINIC | Age: 49
End: 2024-12-17
Payer: COMMERCIAL

## 2024-12-17 NOTE — PROGRESS NOTES
12/17/2024  11:09 AM    Patient Name Sophia Cesar   Appointment Department [facility] NETTA WEIGHT MANAGEMENT  Visit Type Audio (Virtual visit)    Clinic Weights   Wt Readings from Last 3 Encounters:   11/18/24 1517 97.4 kg (214 lb 11.7 oz)   02/27/24 1308 86.4 kg (190 lb 7.6 oz)   12/22/23 1624 87.3 kg (192 lb 7.4 oz)     Last Reported Weights No data was found      [facility] INTERVENTION CONTACT:   Method of contact:  Phone Call   or Participant-Initiated Contact?:  -initiated contact  Type of intervention Contact:  Scheduled Session  Did the participant attend session?: Yes    Was the patient called within 15 minutes of the scheduled appointment?:  Yes  Is this a make-up session?: No    Which session materials covered in session?:  Session 38 and Session 39  Patient Reported Weight (From External Jadiel):  213  Time workout: not reported.  Average Daily Steps: not reported.  Calorie Prescription::  1900  Safety Criteria Triggered:  None  Toolbox Triggered:  Adherence to diet  Adherence to diet: Health  must choose at least two interventions from list::  Reduce portion size and Modify eating behavior and meal patterns  Weight Graph Stoplight Status for Dietary Adherence:  Red Light       Goals         Grocery shop for meal plan foods (pt-stated)                 Walk 1 mile on treadmill twice a week (pt-stated)              Additional Notes:    Patient reports doing well this month after making progress on her goals last month. States she is tracking meals/snacks into the notes jadiel on her phone which has helped her identify some unnecessary snacking and empty calories. She also went to the gym ~5 times. She states that she ultimately made herself go.     Discussed goals for this month and making them actionable.     Goals:   1) Gym, 2 days/week (Tues/Wed, after work)  2) Clean/organize house - one room at a time  3) Stock healthy snacks/groceries - make a grocery list before shopping (keeping unhealthy  food out of the house)  4) Drinking 8 oz bottle of water in the morning, one/day    Clau Farias [facility]  [facility]   [phone]

## 2025-01-08 ENCOUNTER — PATIENT OUTREACH (OUTPATIENT)
Dept: RESEARCH | Facility: CLINIC | Age: 50
End: 2025-01-08
Payer: COMMERCIAL

## 2025-01-12 ENCOUNTER — PATIENT MESSAGE (OUTPATIENT)
Dept: FAMILY MEDICINE | Facility: CLINIC | Age: 50
End: 2025-01-12
Payer: COMMERCIAL

## 2025-01-13 ENCOUNTER — TELEPHONE (OUTPATIENT)
Dept: FAMILY MEDICINE | Facility: CLINIC | Age: 50
End: 2025-01-13
Payer: COMMERCIAL

## 2025-01-13 NOTE — TELEPHONE ENCOUNTER
Patient stated she need test strips called in (New Prescription) for AccuChek Guide meter.    Lov: 11/18/2024

## 2025-01-29 ENCOUNTER — TELEPHONE (OUTPATIENT)
Dept: FAMILY MEDICINE | Facility: CLINIC | Age: 50
End: 2025-01-29
Payer: COMMERCIAL

## 2025-01-29 ENCOUNTER — PATIENT MESSAGE (OUTPATIENT)
Dept: FAMILY MEDICINE | Facility: CLINIC | Age: 50
End: 2025-01-29
Payer: COMMERCIAL

## 2025-01-29 DIAGNOSIS — E11.9 CONTROLLED TYPE 2 DIABETES MELLITUS WITHOUT COMPLICATION, WITHOUT LONG-TERM CURRENT USE OF INSULIN: ICD-10-CM

## 2025-01-29 NOTE — TELEPHONE ENCOUNTER
Pt states her pharmacy is needing a new order for her Accucheck glucometer in order to send her the correct test strips for her meter and to refill her lancets. Please advise. Thank you.

## 2025-01-31 RX ORDER — INSULIN PUMP SYRINGE, 3 ML
EACH MISCELLANEOUS
Qty: 1 EACH | Refills: 0 | Status: SHIPPED | OUTPATIENT
Start: 2025-01-31

## 2025-01-31 NOTE — TELEPHONE ENCOUNTER
I have put the following orders and/or medications to this note.  Please advise pt    No orders of the defined types were placed in this encounter.      Medications Ordered This Encounter   Medications    blood-glucose meter kit     Sig: To check BG 1 times daily, to use with insurance preferred meter - Accucheck     Dispense:  1 each     Refill:  0

## 2025-02-05 ENCOUNTER — PATIENT OUTREACH (OUTPATIENT)
Dept: RESEARCH | Facility: CLINIC | Age: 50
End: 2025-02-05
Payer: COMMERCIAL

## 2025-02-05 NOTE — PROGRESS NOTES
02/05/2025  3:44 PM    Patient Name Sophia Cesar   Appointment Department [facility] PROP WEIGHT MANAGEMENT  Visit Type Audio (Virtual visit)    Clinic Weights   Wt Readings from Last 3 Encounters:   11/18/24 1517 97.4 kg (214 lb 11.7 oz)   02/27/24 1308 86.4 kg (190 lb 7.6 oz)   12/22/23 1624 87.3 kg (192 lb 7.4 oz)     Last Reported Weights No data was found      [facility] INTERVENTION CONTACT:   Method of contact:  Phone Call   or Participant-Initiated Contact?:  -initiated contact  Type of intervention Contact:  Scheduled Session  Did the participant attend session?: Yes    Was the patient called within 15 minutes of the scheduled appointment?:  Yes  Is this a make-up session?: No    Which session materials covered in session?:  Session 44  Patient Reported Weight (From External Jadiel):  216  Time workout: not reported.  Average Daily Steps: not reported.  Calorie Prescription::  1900  Safety Criteria Triggered:  None  Toolbox Triggered:  Adherence to diet  Adherence to diet: Health  must choose at least two interventions from list::  Reduce portion size and Modify eating behavior and meal patterns  Weight Graph Stoplight Status for Dietary Adherence:  Red Light       Goals         Grocery shop for meal plan foods (pt-stated)                 Walk 1 mile on treadmill twice a week (pt-stated)              Additional Notes:     Final graduation session. Patient is grateful for her time in the program and that she learned how to lose weight and has a better mindset/outlook on her weight fluctuation.     Encouraged her to continue weighing and following healthy habits.     Agrees to complete final surveys. Denies questions.     Clau Farias [facility]  [employer]   [phone]

## 2025-02-11 ENCOUNTER — PATIENT MESSAGE (OUTPATIENT)
Dept: FAMILY MEDICINE | Facility: CLINIC | Age: 50
End: 2025-02-11
Payer: COMMERCIAL

## 2025-02-11 ENCOUNTER — TELEPHONE (OUTPATIENT)
Dept: FAMILY MEDICINE | Facility: CLINIC | Age: 50
End: 2025-02-11
Payer: COMMERCIAL

## 2025-02-11 DIAGNOSIS — Z00.00 ANNUAL PHYSICAL EXAM: Primary | ICD-10-CM

## 2025-02-11 NOTE — TELEPHONE ENCOUNTER
I have put the following orders and/or medications to this note.  Please advise pt and assist.    Orders Placed This Encounter   Procedures    Microalbumin/Creatinine Ratio, Urine     Standing Status:   Future     Standing Expiration Date:   4/12/2026     Order Specific Question:   Specimen Source     Answer:   Urine    Hemoglobin A1C     Standing Status:   Future     Standing Expiration Date:   4/12/2026     Order Specific Question:   Send normal result to authorizing provider's In Basket if patient is active on MyChart:     Answer:   Yes    TSH     Standing Status:   Future     Standing Expiration Date:   4/12/2026     Order Specific Question:   Send normal result to authorizing provider's In Basket if patient is active on MyChart:     Answer:   Yes    Comprehensive Metabolic Panel     Standing Status:   Future     Standing Expiration Date:   4/12/2026     Order Specific Question:   Send normal result to authorizing provider's In Basket if patient is active on MyChart:     Answer:   Yes    Lipid Panel     Standing Status:   Future     Standing Expiration Date:   4/12/2026     Order Specific Question:   Send normal result to authorizing provider's In Basket if patient is active on MyChart:     Answer:   Yes    CBC Auto Differential     Standing Status:   Future     Standing Expiration Date:   4/12/2026     Order Specific Question:   Send normal result to authorizing provider's In Basket if patient is active on MyChart:     Answer:   Yes    Insulin, Random     Standing Status:   Future     Standing Expiration Date:   4/12/2026    Vitamin D     Standing Status:   Future     Standing Expiration Date:   4/12/2026     Order Specific Question:   Send normal result to authorizing provider's In Basket if patient is active on MyChart:     Answer:   Yes

## 2025-02-11 NOTE — TELEPHONE ENCOUNTER
Pt is requesting lab orders be placed prior to upcoming appt on 2/18. Pt specifically would like A1C and Vitamin D checked. Please advise. Thanks.

## 2025-02-18 ENCOUNTER — OFFICE VISIT (OUTPATIENT)
Dept: FAMILY MEDICINE | Facility: CLINIC | Age: 50
End: 2025-02-18
Attending: FAMILY MEDICINE
Payer: COMMERCIAL

## 2025-02-18 VITALS
TEMPERATURE: 98 F | HEIGHT: 62 IN | WEIGHT: 224.63 LBS | BODY MASS INDEX: 41.34 KG/M2 | DIASTOLIC BLOOD PRESSURE: 76 MMHG | RESPIRATION RATE: 18 BRPM | OXYGEN SATURATION: 98 % | SYSTOLIC BLOOD PRESSURE: 128 MMHG | HEART RATE: 86 BPM

## 2025-02-18 DIAGNOSIS — Z79.899 ON STATIN THERAPY DUE TO RISK OF FUTURE CARDIOVASCULAR EVENT: ICD-10-CM

## 2025-02-18 DIAGNOSIS — E11.9 CONTROLLED TYPE 2 DIABETES MELLITUS WITHOUT COMPLICATION, WITHOUT LONG-TERM CURRENT USE OF INSULIN: ICD-10-CM

## 2025-02-18 DIAGNOSIS — E11.36 DIABETIC CATARACT: ICD-10-CM

## 2025-02-18 DIAGNOSIS — K63.5 BENIGN COLON POLYP: ICD-10-CM

## 2025-02-18 DIAGNOSIS — Z12.31 OTHER SCREENING MAMMOGRAM: ICD-10-CM

## 2025-02-18 DIAGNOSIS — E66.01 MORBID OBESITY WITH BMI OF 40.0-44.9, ADULT: ICD-10-CM

## 2025-02-18 DIAGNOSIS — Z79.899 ON ANGIOTENSIN-CONVERTING ENZYME (ACE) INHIBITORS: ICD-10-CM

## 2025-02-18 DIAGNOSIS — E89.40 SURGICAL MENOPAUSE: ICD-10-CM

## 2025-02-18 DIAGNOSIS — Z00.00 ANNUAL PHYSICAL EXAM: Primary | ICD-10-CM

## 2025-02-18 PROCEDURE — 3074F SYST BP LT 130 MM HG: CPT | Mod: CPTII,S$GLB,, | Performed by: FAMILY MEDICINE

## 2025-02-18 PROCEDURE — 3008F BODY MASS INDEX DOCD: CPT | Mod: CPTII,S$GLB,, | Performed by: FAMILY MEDICINE

## 2025-02-18 PROCEDURE — 1160F RVW MEDS BY RX/DR IN RCRD: CPT | Mod: CPTII,S$GLB,, | Performed by: FAMILY MEDICINE

## 2025-02-18 PROCEDURE — 3078F DIAST BP <80 MM HG: CPT | Mod: CPTII,S$GLB,, | Performed by: FAMILY MEDICINE

## 2025-02-18 PROCEDURE — 4010F ACE/ARB THERAPY RXD/TAKEN: CPT | Mod: CPTII,S$GLB,, | Performed by: FAMILY MEDICINE

## 2025-02-18 PROCEDURE — 99396 PREV VISIT EST AGE 40-64: CPT | Mod: S$GLB,,, | Performed by: FAMILY MEDICINE

## 2025-02-18 PROCEDURE — 99999 PR PBB SHADOW E&M-EST. PATIENT-LVL V: CPT | Mod: PBBFAC,,, | Performed by: FAMILY MEDICINE

## 2025-02-18 PROCEDURE — 1159F MED LIST DOCD IN RCRD: CPT | Mod: CPTII,S$GLB,, | Performed by: FAMILY MEDICINE

## 2025-02-18 PROCEDURE — 3072F LOW RISK FOR RETINOPATHY: CPT | Mod: CPTII,S$GLB,, | Performed by: FAMILY MEDICINE

## 2025-02-18 RX ORDER — ISOPROPYL ALCOHOL 70 ML/100ML
1 SWAB TOPICAL
Qty: 100 EACH | Refills: 3 | Status: SHIPPED | OUTPATIENT
Start: 2025-02-18

## 2025-02-18 RX ORDER — LANCETS
EACH MISCELLANEOUS
Qty: 100 EACH | Refills: 3 | Status: SHIPPED | OUTPATIENT
Start: 2025-02-18

## 2025-02-18 RX ORDER — SEMAGLUTIDE 2.68 MG/ML
2 INJECTION, SOLUTION SUBCUTANEOUS
Qty: 3 ML | Refills: 11 | Status: SHIPPED | OUTPATIENT
Start: 2025-02-18

## 2025-02-18 NOTE — PROGRESS NOTES
HISTORY OF PRESENT ILLNESS: Ms. Cesar comes in today not fasting without taking medication and without acute problems for annual wellness examination. She reports no acute problems today. She states she donated blood today.     END OF LIFE DECISION: She does not have a living will and does desire life support.    Current Outpatient Medications   Medication Sig    acyclovir (ZOVIRAX) 400 MG tablet TAKE 1 TABLET(400 MG) BY MOUTH TWICE DAILY    blood-glucose meter kit To check BG 1 times daily, to use with insurance preferred meter - Accucheck    lisinopriL (PRINIVIL,ZESTRIL) 5 MG tablet Take 1 tablet (5 mg total) by mouth once daily.    metFORMIN (GLUCOPHAGE) 500 MG tablet Take 1 tablet (500 mg total) by mouth daily with breakfast.    pravastatin (PRAVACHOL) 10 MG tablet Take 1 tablet (10 mg total) by mouth every evening.    semaglutide (OZEMPIC) 1 mg/dose (4 mg/3 mL) Inject 1 mg into the skin every 7 days.     SCREENINGS:   Cholesterol: February 18, 2025 with results pending.  FFS/Colonoscopy: May 26, 2023 - benign colon polyp, diverticulosis; repeat in 7 to 10 days.   Mammogram: July 8, 2024 - okay.    GYN Exam: February 27, 2024 - okay. July 23, 2013 pap smear - okay. No longer needs pap smear.  Dexa Scan: Never.  Eye Exam: July 8, 2024 Dr. Rajeev Smalls. She  wears glasses.   Dental Exam: October 2024.   PPD: Never.  Immunizations: Tdap - September 2, 2010, July 4, 2018.  Gardasil - N./A.  Zostavax - N./A.  Pneumovax - October 25, 2018.  Prevnar - Never. She desires.  Hepatitis B vaccine - December 20, 2018, October 25, 2018, September 6, 2019.  Seasonal Flu - November 18, 2024.  Covid-19 (Pfizer) vaccine series - March 20, 2021, April 10, 2021, January 27, 2022.     ROS:  GENERAL: Denies fever, chills, fatigue, or unusual weight gain. Appetite normal. Weight 97.4 kg (214 lb 11.7 oz) at November 18, 2024 visit.  Reports not recently exercises.  Monitors diet by following portion sizes. Requests increase of Ozempic as  "reports current dose not helpful for craving.  SKIN: Denies rashes, itching, changes in mole, color or texture of skin or easy bruising.   HEAD: Denies headaches or recent head trauma.  EYES: Denies change in vision, pain, diplopia, redness or discharge. Wears glasses.  EARS: Denies ear pain, discharge, vertigo or decreased hearing.  NOSE: Denies loss of smell, epistaxis or rhinitis.  MOUTH & THROAT: Denies hoarseness or change in voice. Denies excessive gum bleeding or mouth sores. Denies sore throat.  NODES: Denies swollen glands.  CHEST: Denies BIRD, wheezing, cough, or sputum production.  CARDIOVASCULAR: Denies chest pain, PND, orthopnea or reduced exercise tolerance. Denies palpitations.  ABDOMEN: Denies diarrhea, constipation, nausea, vomiting, abdominal pain, or blood in stool.  URINARY: Denies flank pain, dysuria or hematuria.  GENITOURINARY: Denies flank pain, dysuria, frequency or hematuria. Does not perform monthly breast self exam.   ENDOCRINE: Denies cholesterol, thyroid problems. Reports performs home glucose checks with fasting levels ranging .  HEME/LYMPH: Denies bleeding problems.  PERIPHERAL VASCULAR:Denies claudication or cyanosis.  MUSCULOSKELETAL: Denies joint stiffness, pain or swelling. Denies edema.  NEUROLOGIC: Denies history of seizures, tremors, paralysis, alteration of gait or coordination.  PSYCHIATRIC: Denies mood swings, depression, anxiety, homicidal or suicidal thoughts. Denies sleep problems.      PE:   VS: /76   Pulse 86   Temp 98.1 °F (36.7 °C) (Tympanic)   Resp 18   Ht 5' 2" (1.575 m)   Wt 101.9 kg (224 lb 10.4 oz)   SpO2 98%   BMI 41.09 kg/m²   APPEARANCE: Well nourished, well developed female, obese and pleasant, alert and oriented, in no acute distress.   HEAD: Non tender. Full range of motion.  EYES: PERRL, conjunctiva pink, lids no edema. She wears glasses.  EARS: External canal patent, no swelling or redness. TM's shiny and clear.  NOSE: Mucosa and " turbinates pink, not swollen. No discharge. Non tender sinuses.  THROAT: No pharyngeal erythema or exudate. No stridor.   NECK: Supple, no mass, thyroid not enlarged.  NODES: No cervical, axillary lymph node enlargement.  CHEST: Normal respiratory effort. Lungs clear to auscultation.  CARDIOVASCULAR: Normal S1, S2. No rubs, murmurs or gallops. PMI not displaced. No carotid bruit. Pedal pulses palpable bilaterally. No edema.  ABDOMEN: Bowel sounds present. Not distended. Soft. No tenderness, masses or organomegaly.  BREAST EXAM: Symmetrical, no external lesions, no discharge, no masses palpated.  PELVIC EXAM: Not examined today per patient request.   RECTAL EXAM: Not examined per patient request.  MUSCULOSKELETAL: No joint deformities or stiffness. She is ambulatory without problems.  SKIN: No rashes or suspicious lesions, normal color and turgor.   NEUROLOGIC: Cranial Nerves: II-XII grossly intact. DTR's: Knees, Ankles 2+ and equal bilaterally. Gait & Posture: Normal gait and fine motion.  PSYCHIATRIC: Patient alert, oriented x 3. Mood/Affect normal. Judgment/insight good as she makes appropriate decisions during today's exam.    Protective Sensation (w/ 10 gram monofilament):  Right: Intact  Left: Intact    Visual Inspection:  Normal -  Bilateral    Pedal Pulses:   Right: Present  Left: Present    Posterior tibialis:   Right:Present  Left: Present     ASSESSMENT:    ICD-10-CM ICD-9-CM    1. Annual physical exam  Z00.00 V70.0       2. Controlled type 2 diabetes mellitus without complication, without long-term current use of insulin - on medication E11.9 250.00 lancets Misc      blood sugar diagnostic Strp      alcohol swabs PadM      Ambulatory referral/consult to Optometry      semaglutide (OZEMPIC) 2 mg/dose (8 mg/3 mL) PnIj      3. 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 On statin therapy due to risk of future cardiovascular event   Z79.899 V58.69       5. On angiotensin-converting enzyme (ACE) inhibitors  Z79.899 V07.52       6. Benign colon polyp  K63.5 211.3       7. Surgical menopause  E89.40 627.4       8. Morbid obesity with BMI of 40.0-44.9, adult - managed with diet E66.01 278.01     Z68.41 V85.41       9. Other screening mammogram  Z12.31 V76.12 Mammo Digital Screening Bilat w/ Gerard (XPD)        PLAN:  1. Age-appropriate counseling-appropriate low-sodium, low-cholesterol, low carbohydrate diet and exercise daily, monthly breast self exam, annual wellness examination.   2. Patient advised to call for results.  3. Continue current medications.  4. Increase Ozempic 1 mg to 2 mg weekly as directed.  5. Prescription refills as noted above.  6. Keep follow up with specialists.  7. Annual eye and dental examinations advised.                  8. Follow up in about 6 months (around 8/18/2025) for diabetes follow up.

## 2025-02-19 ENCOUNTER — PATIENT MESSAGE (OUTPATIENT)
Dept: FAMILY MEDICINE | Facility: CLINIC | Age: 50
End: 2025-02-19
Payer: COMMERCIAL

## 2025-02-20 DIAGNOSIS — Z79.899 ON STATIN THERAPY DUE TO RISK OF FUTURE CARDIOVASCULAR EVENT: ICD-10-CM

## 2025-02-20 DIAGNOSIS — Z79.899 ON ANGIOTENSIN-CONVERTING ENZYME (ACE) INHIBITORS: ICD-10-CM

## 2025-02-20 DIAGNOSIS — B00.9 HSV-2 INFECTION: ICD-10-CM

## 2025-02-20 DIAGNOSIS — E11.9 CONTROLLED TYPE 2 DIABETES MELLITUS WITHOUT COMPLICATION, WITHOUT LONG-TERM CURRENT USE OF INSULIN: ICD-10-CM

## 2025-02-21 RX ORDER — PRAVASTATIN SODIUM 20 MG/1
20 TABLET ORAL NIGHTLY
Qty: 90 TABLET | Refills: 1 | Status: SHIPPED | OUTPATIENT
Start: 2025-02-21

## 2025-02-21 RX ORDER — LISINOPRIL 5 MG/1
5 TABLET ORAL DAILY
Qty: 90 TABLET | Refills: 3 | Status: SHIPPED | OUTPATIENT
Start: 2025-02-21

## 2025-02-21 RX ORDER — METFORMIN HYDROCHLORIDE 500 MG/1
500 TABLET ORAL
Qty: 90 TABLET | Refills: 3 | Status: SHIPPED | OUTPATIENT
Start: 2025-02-21

## 2025-02-21 RX ORDER — ACYCLOVIR 400 MG/1
TABLET ORAL
Qty: 180 TABLET | Refills: 3 | Status: SHIPPED | OUTPATIENT
Start: 2025-02-21

## 2025-02-21 NOTE — TELEPHONE ENCOUNTER
Care Due:                  Date            Visit Type   Department     Provider  --------------------------------------------------------------------------------                                EP -                              PRIMARY      [facility]  Last Visit: 02-      CARE ([facility])   MEDICINE       Barbi Elkins                              EP -                              PRIMARY      [facility]  Next Visit: 08-      CARE ([facility])   MEDICINE       Barbi Elkins                                                            Last  Test          Frequency    Reason                     Performed    Due Date  --------------------------------------------------------------------------------    CMP.........  12 months..  lisinopriL, metFORMIN,     02-   02-                             pravastatin..............    HBA1C.......  6 months...  metFORMIN, semaglutide...  11-   05-    Lipid Panel.  12 months..  pravastatin..............  02-   02-    [facility] Embedded Care Due Messages. Reference number: [id number].   2/21/2025 2:25:26 PM CST

## 2025-02-21 NOTE — TELEPHONE ENCOUNTER
Refill Routing Note   Medication(s) are not appropriate for processing by [facility] for the following reason(s):        Required labs outdated: CBC, CMP, lipid panel    [facility] action(s):  Defer   Requires labs : Yes - A1C due 5/14/25            Appointments  past 12m or future 3m with PCP    Date Provider   Last Visit   2/18/2025 Barbi Elkins MD   Next Visit   Visit date not found Barbi Elkins MD   ED visits in past 90 days: 0        Note composed:3:21 PM 02/21/2025

## 2025-03-09 ENCOUNTER — TELEPHONE (OUTPATIENT)
Dept: PHARMACY | Facility: CLINIC | Age: 50
End: 2025-03-09
Payer: COMMERCIAL

## 2025-03-09 NOTE — TELEPHONE ENCOUNTER
[facility]/Population Health Chart Review & Patient Outreach Details For Medication Adherence Project    Reason for Outreach Encounter: 3rd Party payor non-compliance report ([employer], [employer], C, etc)  2.  Patient Outreach Method: Reviewed Patient Chart  3.   Medication in question: lisinopril   LAST FILLED: 2/25/25 for 90 day supply  Hypertension Medications              lisinopriL (PRINIVIL,ZESTRIL) 5 MG tablet Take 1 tablet (5 mg total) by mouth once daily.              4.  Reviewed and or Updates Made To: Patient Chart  5. Outreach Outcomes and/or actions taken: Patient filled medication and is on track to be adherent

## 2025-03-14 ENCOUNTER — PATIENT MESSAGE (OUTPATIENT)
Dept: FAMILY MEDICINE | Facility: CLINIC | Age: 50
End: 2025-03-14
Payer: COMMERCIAL

## 2025-03-14 NOTE — TELEPHONE ENCOUNTER
"Portal msg from pt:  "Crazy question , but I have an abscess tooth right now and my dentist does not have any appointments until next week. Does dr shrestha see patients for that or can she call in an antibiotic for it ?"    "

## 2025-03-23 ENCOUNTER — TELEPHONE (OUTPATIENT)
Dept: PHARMACY | Facility: CLINIC | Age: 50
End: 2025-03-23
Payer: COMMERCIAL

## 2025-03-23 NOTE — TELEPHONE ENCOUNTER
[facility]/Population Health Chart Review & Patient Outreach Details For Medication Adherence Project    Reason for Outreach Encounter: 3rd Party payor non-compliance report ([employer], [employer], C, etc)  2.  Patient Outreach Method: Reviewed Patient Chart  3.   Medication in question: lisinopril   LAST FILLED: 2/21/25 for 90 day supply  Hypertension Medications              lisinopriL (PRINIVIL,ZESTRIL) 5 MG tablet Take 1 tablet (5 mg total) by mouth once daily.              4.  Reviewed and or Updates Made To: Patient Chart  5. Outreach Outcomes and/or actions taken: Patient filled medication and is on track to be adherent

## 2025-05-03 NOTE — PROGRESS NOTES
01/08/2025  2:06 PM    Patient Name Sophia Cesar   Appointment Department [facility] NETTA WEIGHT MANAGEMENT  Visit Type Audio (Virtual visit)    Clinic Weights   Wt Readings from Last 3 Encounters:   11/18/24 1517 97.4 kg (214 lb 11.7 oz)   02/27/24 1308 86.4 kg (190 lb 7.6 oz)   12/22/23 1624 87.3 kg (192 lb 7.4 oz)     Last Reported Weights No data was found      [facility] PROP INTERVENTION CONTACT:   Method of contact:  Phone Call   or Participant-Initiated Contact?:  -initiated contact  Type of intervention Contact:  Scheduled Session  Did the participant attend session?: Yes    Was the patient called within 15 minutes of the scheduled appointment?:  Yes  Is this a make-up session?: No    Which session materials covered in session?:  Session 41 and Session 40  Patient Reported Weight (From External Jadiel):  207  Time workout: not specified.  Average Daily Steps: not specified.  Calorie Prescription::  1900  Safety Criteria Triggered:  None  Toolbox Triggered:  Adherence to diet  Adherence to diet: Health  must choose at least two interventions from list::  Reduce portion size and Modify eating behavior and meal patterns  Weight Graph Stoplight Status for Dietary Adherence:  Red Light       Goals         Grocery shop for meal plan foods (pt-stated)                 Walk 1 mile on treadmill twice a week (pt-stated)              Additional Notes:    Patient reports doing well and is making progress on her goals from last month. She is pleased with her progress and understands that consistency will help her develop these goals into habits.     Goals:   1) Gym, 2 days/week (Tues/Wed, after work)  2) Stock healthy snacks/groceries - make a grocery list before shopping (keeping unhealthy food out of the house)  3) Drinking 8 oz bottle of water in the morning, one/day (put a bottle next to the bed at night)    JOANNA Reynoso-[facility]  [employer] [facility]   [phone]     no

## 2025-05-07 ENCOUNTER — PATIENT MESSAGE (OUTPATIENT)
Dept: FAMILY MEDICINE | Facility: CLINIC | Age: 50
End: 2025-05-07

## 2025-05-07 ENCOUNTER — OFFICE VISIT (OUTPATIENT)
Dept: FAMILY MEDICINE | Facility: CLINIC | Age: 50
End: 2025-05-07
Payer: COMMERCIAL

## 2025-05-07 ENCOUNTER — TELEPHONE (OUTPATIENT)
Dept: FAMILY MEDICINE | Facility: CLINIC | Age: 50
End: 2025-05-07

## 2025-05-07 DIAGNOSIS — R30.0 DYSURIA: Primary | ICD-10-CM

## 2025-05-07 DIAGNOSIS — R31.9 HEMATURIA, UNSPECIFIED TYPE: ICD-10-CM

## 2025-05-07 DIAGNOSIS — E66.01 MORBID OBESITY WITH BMI OF 40.0-44.9, ADULT: ICD-10-CM

## 2025-05-07 DIAGNOSIS — E11.9 CONTROLLED TYPE 2 DIABETES MELLITUS WITHOUT COMPLICATION, WITHOUT LONG-TERM CURRENT USE OF INSULIN: ICD-10-CM

## 2025-05-07 PROCEDURE — 1160F RVW MEDS BY RX/DR IN RCRD: CPT | Mod: CPTII,95,,

## 2025-05-07 PROCEDURE — 4010F ACE/ARB THERAPY RXD/TAKEN: CPT | Mod: CPTII,95,,

## 2025-05-07 PROCEDURE — 3072F LOW RISK FOR RETINOPATHY: CPT | Mod: CPTII,95,,

## 2025-05-07 PROCEDURE — 98005 SYNCH AUDIO-VIDEO EST LOW 20: CPT | Mod: 95,,,

## 2025-05-07 PROCEDURE — 1159F MED LIST DOCD IN RCRD: CPT | Mod: CPTII,95,,

## 2025-05-07 RX ORDER — NITROFURANTOIN 25; 75 MG/1; MG/1
100 CAPSULE ORAL 2 TIMES DAILY
Qty: 14 CAPSULE | Refills: 0 | Status: SHIPPED | OUTPATIENT
Start: 2025-05-07 | End: 2025-05-14

## 2025-05-07 NOTE — TELEPHONE ENCOUNTER
Copied from CRM #[id number]. Topic: Appointments - Amb Referral  >> May 7, 2025 10:39 AM Lucia wrote:  Type:  Patient Requesting Referral    Who Called:pt  Does the patient already have the specialty appointment scheduled?:yes  If yes, what is the date of that appointment?:05/07  Referral to What Specialty:labs  Reason for Referral:virtual visit  Does the patient want the referral with a specific physician?:yes  Is the specialist an [facility]?:non  Patient Requesting a Response?:yes  Would the patient rather a call back or a response via [facility]? call  Best Call Back Number:[phone]  Additional Information: patient had virtual appt today and is at [facility] now but labs were not sent over. Please fax asap to [phone]

## 2025-05-07 NOTE — PROGRESS NOTES
"Sophia Cesar  MRN:  [id number]  49 y.o. female      PRIMARY CARE TELEMEDICINE NOTE    Patient location:  [place]  Visit type: Virtual visit with synchronous audio and video  Each patient to whom he or she provides medical services by telemedicine is:  (1) informed of the relationship between the physician and patient and the respective role of any other health care provider with respect to management of the patient  (2) notified that he or she may decline to receive medical services by telemedicine and may withdraw from such care at any time      SUBJECTIVE:     CHIEF COMPLAINT:     UTI    HPI:    Sophia Cesar is seen virtually today for suspected urinary tract infection. She reports worsening urinary symptoms, describing today as "the worst day" since onset (Saturday). She experiences dysuria, urgency, frequency and gross hematuria, visible as light pink on tissue. At-home urinalysis was positive for leukocytes with all other parameters negative.      REVIEW OF SYSTEMS:  Review of Systems   Constitutional:  Negative for chills and fever.   Respiratory:  Negative for shortness of breath.    Cardiovascular:  Negative for chest pain.   Gastrointestinal:  Negative for constipation, nausea and vomiting.   Genitourinary:  Positive for dysuria, frequency, hematuria and urgency. Negative for flank pain.   Skin:  Negative for rash.        Answers submitted by the patient for this visit:  Painful Urination Questionnaire (Submitted on 5/7/2025)  Chief Complaint: Dysuria  Chronicity: new  Onset: in the past 7 days  Frequency: every urination  Progression since onset: rapidly worsening  Pain quality: aching, burning, shooting, stabbing  Pain - numeric: 8/10  Fever: no fever  Sexually active?: Yes  History of pyelonephritis?: No  discharge: Yes  hesitancy: Yes  possible pregnancy: No  sweats: No  weight loss: No  withholding: Yes  behavior changes: No  Treatments tried: increased fluids  Improvement on treatment: no " "relief  Pain severity: moderate  catheterization: Yes  recurrent UTIs: No  single kidney: No  STD: No  urinary stasis: No  urological procedure: No  kidney stones: No    CURRENT ALLERGY LIST:  Review of patient's allergies indicates:  No Known Allergies    CURRENT PROBLEM LIST:  Problem List[1]    CURRENT MEDICATIONS:  Current Medications[2]      Past medical, surgical, family and social histories have been reviewed today.      OBJECTIVE:     EXAM:  Constitutional:  In NAD, pleasant, cooperative.  Appears well-developed and well-nourished.   Respiratory:  Unlabored, in no resp distress.  Neurological: Alert and oriented to person, place, and time.   Psychiatric: Normal mood and affect, behavior is normal. Judgment and thought content normal.     Complete PE deferred due to video visit        ASSESSMENT:     1. Dysuria    -     nitrofurantoin, macrocrystal-monohydrate, (MACROBID) 100 MG capsule; Take 1 capsule (100 mg total) by mouth 2 (two) times daily. for 7 days  Dispense: 14 capsule; Refill: 0  -     Urine Culture, Comprehensive; Future; Expected date: 05/07/2025    2. Hematuria, unspecified type    -     Urine Culture, Comprehensive; Future; Expected date: 05/07/2025    3. Controlled type 2 diabetes mellitus without complication, without long-term current use of insulin   - chronic, stable on Ozempic and metformin     4. Morbid obesity with BMI of 40.0-44.9, adult   - healthy lifestyle and diet modifications advised            PLAN:     Start macrobid as empiric therapy for suspected UTI  U/A with culture (order sent to lab matth[facility] per patient request)  Will change abx if needed pending C&S report  Increase water intake  Follow up in office for worsening symptoms     20 minutes of total time spent on the encounter, which includes "face to face" (virtual) time and non-face to face time preparing to see the patient .  This includes review of tests, obtaining and/or reviewing separately obtained history, and " documenting clinical information in the electronic or other health record.  Also includes independent interpretation of results (not separately reported) and communicating results to the patient/family/caregiver, with care coordination (not separately reported).       Josue Mayes, MSN, APRN, FNP-C           [1]   Patient Active Problem List  Diagnosis    Anxiety and depression    TMJ (dislocation of temporomandibular joint)    Morbid obesity with BMI of 40.0-44.9, adult    Vitamin D deficiency    On angiotensin-converting enzyme (ACE) inhibitors    On statin therapy due to risk of future cardiovascular event    Controlled type 2 diabetes mellitus without complication, without long-term current use of insulin    Obesity (BMI 30.0-34.9)    Surgical menopause    Research subject    Colon cancer screening    Diabetic cataract    Benign colon polyp   [2]   Current Outpatient Medications:     lisinopriL (PRINIVIL,ZESTRIL) 5 MG tablet, Take 1 tablet (5 mg total) by mouth once daily., Disp: 90 tablet, Rfl: 3    metFORMIN (GLUCOPHAGE) 500 MG tablet, Take 1 tablet (500 mg total) by mouth daily with breakfast., Disp: 90 tablet, Rfl: 3    pravastatin (PRAVACHOL) 20 MG tablet, Take 1 tablet (20 mg total) by mouth every evening., Disp: 90 tablet, Rfl: 1    semaglutide (OZEMPIC) 2 mg/dose (8 mg/3 mL) PnIj, Inject 2 mg into the skin every 7 days., Disp: 3 mL, Rfl: 11    acyclovir (ZOVIRAX) 400 MG tablet, TAKE 1 TABLET(400 MG) BY MOUTH TWICE DAILY, Disp: 180 tablet, Rfl: 3    alcohol swabs PadM, Apply 1 each topically as needed (with finger stick glucose)., Disp: 100 each, Rfl: 3    blood sugar diagnostic Strp, To check BG 1 times daily, to use with preferred meter - Accuchek Guide Me, Disp: 100 strip, Rfl: 3    blood-glucose meter kit, To check BG 1 times daily, to use with insurance preferred meter - Accucheck, Disp: 1 each, Rfl: 0    lancets Misc, To check BG 1 times daily, to use with preferred meter - Accuchek Guide Me, Disp:  100 each, Rfl: 3    nitrofurantoin, macrocrystal-monohydrate, (MACROBID) 100 MG capsule, Take 1 capsule (100 mg total) by mouth 2 (two) times daily. for 7 days, Disp: 14 capsule, Rfl: 0

## 2025-05-16 LAB
CHOLEST SERPL-MCNC: 163 MG/DL (ref 100–200)
CHOLEST SERPL-MCNC: 65 MG/DL
HBA1C MFR BLD: 5.5 % (ref 4.8–5.6)
HDL/CHOLESTEROL RATIO: 2.8 %
HDLC SERPL-MCNC: 58 MG/DL
LDLC SERPL CALC-MCNC: 92 MG/DL
VLDLC SERPL-MCNC: 13 MG/DL (ref 5–40)

## 2025-05-20 ENCOUNTER — TELEPHONE (OUTPATIENT)
Dept: PHARMACY | Facility: CLINIC | Age: 50
End: 2025-05-20
Payer: COMMERCIAL

## 2025-05-20 NOTE — TELEPHONE ENCOUNTER
[facility] Chart Review & Patient Outreach Details For Medication Adherence Project    Reason for Outreach Encounter: 3rd Party payor non-compliance report ([employer], [employer], C, etc)  2.  Patient Outreach Method: Reviewed patient chart  and [facility] message  3.   Medication in question:    Hyperlipidemia Medications              pravastatin (PRAVACHOL) 20 MG tablet Take 1 tablet (20 mg total) by mouth every evening.                  LF 90 ds 2/21/25    4.  Reviewed and or Updates Made To: Patient Chart  5. Outreach Outcomes and/or actions taken: Patient filled medication and is on track to be adherent and Patient reminded to  prescription  Additional Notes:

## 2025-06-05 ENCOUNTER — TELEPHONE (OUTPATIENT)
Dept: PHARMACY | Facility: CLINIC | Age: 50
End: 2025-06-05
Payer: COMMERCIAL

## 2025-06-06 ENCOUNTER — PATIENT OUTREACH (OUTPATIENT)
Dept: ADMINISTRATIVE | Facility: HOSPITAL | Age: 50
End: 2025-06-06
Payer: COMMERCIAL

## 2025-06-13 ENCOUNTER — TELEPHONE (OUTPATIENT)
Dept: PHARMACY | Facility: CLINIC | Age: 50
End: 2025-06-13
Payer: COMMERCIAL

## 2025-06-13 NOTE — TELEPHONE ENCOUNTER
[facility] Chart Review & Patient Outreach Details For Medication Adherence Project    Reason for Outreach Encounter: 3rd Party payor non-compliance report ([employer], [employer], C, etc)  2.  Patient Outreach Method: Reviewed patient chart   3.   Medication in question:    Hypertension Medications              lisinopriL (PRINIVIL,ZESTRIL) 5 MG tablet Take 1 tablet (5 mg total) by mouth once daily.                 lisinopril  last filled  6/5/25 for 90 day supply      4.  Reviewed and or Updates Made To: Patient Chart  5. Outreach Outcomes and/or actions taken: Patient filled medication and is on track to be adherent  Additional Notes:

## 2025-07-14 ENCOUNTER — OFFICE VISIT (OUTPATIENT)
Dept: OPHTHALMOLOGY | Facility: CLINIC | Age: 50
End: 2025-07-14
Attending: FAMILY MEDICINE
Payer: COMMERCIAL

## 2025-07-14 ENCOUNTER — OFFICE VISIT (OUTPATIENT)
Dept: OBSTETRICS AND GYNECOLOGY | Facility: CLINIC | Age: 50
End: 2025-07-14
Payer: COMMERCIAL

## 2025-07-14 ENCOUNTER — HOSPITAL ENCOUNTER (OUTPATIENT)
Dept: RADIOLOGY | Facility: HOSPITAL | Age: 50
Discharge: HOME | End: 2025-07-14
Attending: FAMILY MEDICINE
Payer: COMMERCIAL

## 2025-07-14 VITALS
SYSTOLIC BLOOD PRESSURE: 126 MMHG | HEIGHT: 62 IN | DIASTOLIC BLOOD PRESSURE: 74 MMHG | BODY MASS INDEX: 41.62 KG/M2 | WEIGHT: 226.19 LBS

## 2025-07-14 VITALS — HEIGHT: 62 IN | BODY MASS INDEX: 41.62 KG/M2 | WEIGHT: 226.19 LBS

## 2025-07-14 DIAGNOSIS — H52.4 MYOPIA WITH ASTIGMATISM AND PRESBYOPIA, BILATERAL: ICD-10-CM

## 2025-07-14 DIAGNOSIS — N89.8 VAGINAL MASS: ICD-10-CM

## 2025-07-14 DIAGNOSIS — H25.13 NUCLEAR SCLEROSIS OF BOTH EYES: ICD-10-CM

## 2025-07-14 DIAGNOSIS — E11.9 DIABETES MELLITUS WITHOUT COMPLICATION: Primary | ICD-10-CM

## 2025-07-14 DIAGNOSIS — Z12.31 OTHER SCREENING MAMMOGRAM: ICD-10-CM

## 2025-07-14 DIAGNOSIS — H52.203 MYOPIA WITH ASTIGMATISM AND PRESBYOPIA, BILATERAL: ICD-10-CM

## 2025-07-14 DIAGNOSIS — E11.9 CONTROLLED TYPE 2 DIABETES MELLITUS WITHOUT COMPLICATION, WITHOUT LONG-TERM CURRENT USE OF INSULIN: ICD-10-CM

## 2025-07-14 DIAGNOSIS — H52.13 MYOPIA WITH ASTIGMATISM AND PRESBYOPIA, BILATERAL: ICD-10-CM

## 2025-07-14 DIAGNOSIS — Z01.419 ENCOUNTER FOR GYNECOLOGICAL EXAMINATION WITHOUT ABNORMAL FINDING: Primary | ICD-10-CM

## 2025-07-14 DIAGNOSIS — E11.36 DIABETIC CATARACT: ICD-10-CM

## 2025-07-14 DIAGNOSIS — Z90.710 S/P HYSTERECTOMY: ICD-10-CM

## 2025-07-14 PROCEDURE — 3044F HG A1C LEVEL LT 7.0%: CPT | Mod: CPTII,S$GLB,, | Performed by: NURSE PRACTITIONER

## 2025-07-14 PROCEDURE — 1159F MED LIST DOCD IN RCRD: CPT | Mod: CPTII,S$GLB,, | Performed by: OPTOMETRIST

## 2025-07-14 PROCEDURE — 77063 BREAST TOMOSYNTHESIS BI: CPT | Mod: 26,,, | Performed by: RADIOLOGY

## 2025-07-14 PROCEDURE — 4010F ACE/ARB THERAPY RXD/TAKEN: CPT | Mod: CPTII,S$GLB,, | Performed by: NURSE PRACTITIONER

## 2025-07-14 PROCEDURE — 1159F MED LIST DOCD IN RCRD: CPT | Mod: CPTII,S$GLB,, | Performed by: NURSE PRACTITIONER

## 2025-07-14 PROCEDURE — 4010F ACE/ARB THERAPY RXD/TAKEN: CPT | Mod: CPTII,S$GLB,, | Performed by: OPTOMETRIST

## 2025-07-14 PROCEDURE — 1160F RVW MEDS BY RX/DR IN RCRD: CPT | Mod: CPTII,S$GLB,, | Performed by: NURSE PRACTITIONER

## 2025-07-14 PROCEDURE — 77063 BREAST TOMOSYNTHESIS BI: CPT | Mod: TC

## 2025-07-14 PROCEDURE — 3078F DIAST BP <80 MM HG: CPT | Mod: CPTII,S$GLB,, | Performed by: NURSE PRACTITIONER

## 2025-07-14 PROCEDURE — 3008F BODY MASS INDEX DOCD: CPT | Mod: CPTII,S$GLB,, | Performed by: NURSE PRACTITIONER

## 2025-07-14 PROCEDURE — 3044F HG A1C LEVEL LT 7.0%: CPT | Mod: CPTII,S$GLB,, | Performed by: OPTOMETRIST

## 2025-07-14 PROCEDURE — 92014 COMPRE OPH EXAM EST PT 1/>: CPT | Mod: S$GLB,,, | Performed by: OPTOMETRIST

## 2025-07-14 PROCEDURE — 99999 PR PBB SHADOW E&M-EST. PATIENT-LVL III: CPT | Mod: PBBFAC,,, | Performed by: OPTOMETRIST

## 2025-07-14 PROCEDURE — 92015 DETERMINE REFRACTIVE STATE: CPT | Mod: S$GLB,,, | Performed by: OPTOMETRIST

## 2025-07-14 PROCEDURE — 3072F LOW RISK FOR RETINOPATHY: CPT | Mod: CPTII,S$GLB,, | Performed by: NURSE PRACTITIONER

## 2025-07-14 PROCEDURE — 3074F SYST BP LT 130 MM HG: CPT | Mod: CPTII,S$GLB,, | Performed by: NURSE PRACTITIONER

## 2025-07-14 PROCEDURE — 2023F DILAT RTA XM W/O RTNOPTHY: CPT | Mod: CPTII,S$GLB,, | Performed by: OPTOMETRIST

## 2025-07-14 PROCEDURE — 77067 SCR MAMMO BI INCL CAD: CPT | Mod: 26,,, | Performed by: RADIOLOGY

## 2025-07-14 PROCEDURE — 99999 PR PBB SHADOW E&M-EST. PATIENT-LVL IV: CPT | Mod: PBBFAC,,, | Performed by: NURSE PRACTITIONER

## 2025-07-14 PROCEDURE — 99386 PREV VISIT NEW AGE 40-64: CPT | Mod: S$GLB,,, | Performed by: NURSE PRACTITIONER

## 2025-07-14 NOTE — PROGRESS NOTES
CC: Well woman exam    Sophia Cesar is a 49 y.o. female  presents for a well woman exam.     New gyn pt - states had hysterectomy in 2006 post delivery due to bleeding  Retains ovaries  Has mmg today       Health Maintenance Topics with due status: Not Due       Topic Last Completion Date    TETANUS VACCINE 2018    Colorectal Cancer Screening 2023    Influenza Vaccine 2024    Foot Exam 2024    Low Dose Statin 2025    Lipid Panel 2025    Hemoglobin A1c 2025    RSV Vaccine (Age 60+ and Pregnant patients) Not Due      Past Medical History:   Diagnosis Date    Anemia during pregnancy     Diabetes mellitus, type 2     HSV-1 infection     HSV-2 infection     Obesity     Pre-diabetes 2016    Premature surgical menopause     No history of abnormal pap smear    Vitamin D deficiency 2016     Past Surgical History:   Procedure Laterality Date    COLONOSCOPY N/A 2023    Procedure: COLONOSCOPY;  Surgeon: Joel Abraham MD;  Location: [facility];  Service: Endoscopy;  Laterality: N/A;    HYSTERECTOMY      PARTIAL HYSTERECTOMY      Due to DUB after delivery     Family History   Problem Relation Name Age of Onset    Hypertension Mother      Glaucoma Sister      Cancer Maternal Grandmother          Pancreatic cancer    No Known Problems Daughter      No Known Problems Son      Cancer Maternal Aunt          Breast cancer    Hypertension Maternal Aunt      Breast cancer Maternal Aunt      Stroke Maternal Aunt      Hypertension Maternal Aunt      Diabetes Maternal Aunt      Hypertension Maternal Aunt      Hypertension Maternal Aunt      Diabetes Paternal Uncle      Crohn's disease Cousin      Heart disease Neg Hx      Thyroid cancer Neg Hx          MEN2     Social History[1]  OB History          2    Para   2    Term   2            AB        Living   2         SAB        IAB        Ectopic        Multiple        Live Births                "      /74 (BP Location: Left arm, Patient Position: Sitting)   Ht 5' 2" (1.575 m)   Wt 102.6 kg (226 lb 3.1 oz)   BMI 41.37 kg/m²     ROS:  Per hpi    PE:   APPEARANCE: Well nourished, well developed, in no acute distress.  AFFECT: WNL, alert and oriented x 3.  SKIN: No acne or hirsutism.  NECK: Neck symmetric without masses or thyromegaly.  NODES: No inguinal, cervical, axillary or femoral lymph node enlargement.  CHEST: Good respiratory effort.   ABDOMEN: Soft. No tenderness or masses. No hepatosplenomegaly. No hernias.  BREASTS: Symmetrical, no skin changes or visible lesions. No palpable masses, nipple discharge bilaterally.  PELVIC: Normal external female genitalia without lesions. Normal hair distribution. Adequate perineal body, normal urethral meatus. Vagina  without lesions or discharge - can not see a cervix on exam even with change of speculum but on bimanual not sure if feeling cervix or cervix remnant or vaginal mass. No significant cystocele or rectocele. Bimanual exam shows uterus and cervix to be surgically absent. Adnexa without masses or tenderness.    Physical Exam     1. Encounter for gynecological examination without abnormal finding        2. Vaginal mass  US Pelvis Comp with Transvag NON-OB (xpd      3. S/P hysterectomy  US Pelvis Comp with Transvag NON-OB (xpd       and PLAN:    Sophia was seen today for well woman.    Diagnoses and all orders for this visit:    Encounter for gynecological examination without abnormal finding    Vaginal mass  -     US Pelvis Comp with Transvag NON-OB (xpd; Future    S/P hysterectomy  -     US Pelvis Comp with Transvag NON-OB (xpd; Future     [facility] her [facility] from 2006  Pelvic u/s to look at what feeling in vaginal canal     Patient was counseled today on A.C.S. Pap guidelines and recommendations for yearly pelvic exams, mammograms and monthly self breast exams; to see her PCP for other health maintenance. "                       Answers submitted by the patient for this visit:  Gynecologic Exam Questionnaire  (Submitted on 2025)  Chief Complaint: Gynecologic exam  genital itching: No  genital lesions: No  genital odor: No  genital rash: No  missed menses: No  pelvic pain: No  vaginal bleeding: No  vaginal discharge: No  Pregnant now?: No  abdominal pain: No  anorexia: No  back pain: No  chills: No  constipation: No  diarrhea: No  discolored urine: No  dysuria: No  fever: No  flank pain: No  frequency: No  headaches: No  hematuria: No  nausea: No  painful intercourse: No  rash: No  urgency: No  vomiting: No  Vaginal bleeding: no bleeding  Passing clots?: No  Passing tissue?: No  Sexual activity: sexually active  Partner with STD symptoms: unknown  STD: Yes  abdominal surgery: Yes   section: No  Ectopic pregnancy: No  Endometriosis: No  herpes simplex: Yes  gynecological surgery: Yes  menorrhagia: No  metrorrhagia: No  miscarriage: No  ovarian cysts: No  perineal abscess: No  PID: No  terminated pregnancy: No  vaginosis: No         [1]   Social History  Tobacco Use    Smoking status: Former     Current packs/day: 0.00     Types: Cigarettes     Quit date: 2022     Years since quittin.6    Smokeless tobacco: Never   Substance Use Topics    Alcohol use: Yes     Alcohol/week: 0.0 standard drinks of alcohol     Comment: Rarely    Drug use: No

## 2025-07-14 NOTE — PROGRESS NOTES
HPI     Diabetic Eye Exam            Comments: Pt reports for diabetic eye exam.  Pt states her VA has been stable since her last eye exam. Pt is currently   wearing SVL full time and is not currently using any eyedrops.   Pt denies any eye pain, irritation, and or any new ocular disturbances.            Comments    1. Dm dx 2019  Lab Results       Component                Value               Date                       HGBA1C                   5.5                 05/16/2025               2. Family history of glaucoma- sister  -GCL 36/36 03/30/2023  -GCL 36/36 07/08/2024             Last edited by India Zhang on 7/14/2025 10:41 AM.            Assessment /Plan     For exam results, see Encounter Report.    1. Diabetes mellitus without complication  Last A1c 5.5 There was no diabetic retinopathy present on either eye on examination today. Recommend good blood pressure control, strict blood glucose control, and good cholesterol control.  Continue close care with Dr. Elkins regarding diabetes.    2. Nuclear sclerosis of both eyes  Cataracts are not visually significant and not affecting activities of daily living. Annual observation is recommended at this time. Patient to call or return to clinic with any significant change in vision prior to next visit.    3. Myopia with astigmatism and presbyopia, bilateral  Eyeglass Final Rx       Eyeglass Final Rx         Sphere Cylinder Axis Add    Right -1.00 +2.50 066 +1.75    Left -0.75 +1.25 110 +1.75      Type: PAL    Expiration Date: 7/14/2026   PD 68                 RTC 1 yr for dilated eye exam or sooner if any changes to vision.   Discussed above and answered questions.

## 2025-07-16 ENCOUNTER — HOSPITAL ENCOUNTER (OUTPATIENT)
Dept: RADIOLOGY | Facility: HOSPITAL | Age: 50
Discharge: HOME | End: 2025-07-16
Attending: NURSE PRACTITIONER
Payer: COMMERCIAL

## 2025-07-16 DIAGNOSIS — N89.8 VAGINAL MASS: ICD-10-CM

## 2025-07-16 DIAGNOSIS — Z90.710 S/P HYSTERECTOMY: ICD-10-CM

## 2025-07-16 PROCEDURE — 76856 US EXAM PELVIC COMPLETE: CPT | Mod: TC

## 2025-07-16 PROCEDURE — 76856 US EXAM PELVIC COMPLETE: CPT | Mod: 26,,, | Performed by: RADIOLOGY

## 2025-07-16 PROCEDURE — 76830 TRANSVAGINAL US NON-OB: CPT | Mod: 26,,, | Performed by: RADIOLOGY

## 2025-07-21 ENCOUNTER — TELEPHONE (OUTPATIENT)
Dept: OBSTETRICS AND GYNECOLOGY | Facility: CLINIC | Age: 50
End: 2025-07-21
Payer: COMMERCIAL

## 2025-07-21 NOTE — TELEPHONE ENCOUNTER
Copied from CRM #[id number]. Topic: General Inquiry - Return Call  >> Jul 18, 2025  4:13 PM Ellen wrote:  Type:  Patient Returning Call    Who Called:Sophia Cesra   Who Left Message for Patient:Glenda Bagley MA  Does the patient know what this is regarding?:pelvic ultrasound results  Would the patient rather a call back or a response via [facility]? Call back  Best Call Back Number:[phone]  Thanks!

## 2025-07-21 NOTE — TELEPHONE ENCOUNTER
Giselle Jha, NP  7/18/2025  7:51 AM CDT       Pt does appear to retain her cervix and there is some cystic changes to the cervix - would like her to follow up with Dr. Wolfe to review and discuss and see if he can collect a pap from her      Called patient and after verifying with 2 identifiers the above results discussed and scheduled appointment with SMITH.

## 2025-07-29 ENCOUNTER — TELEPHONE (OUTPATIENT)
Dept: PHARMACY | Facility: CLINIC | Age: 50
End: 2025-07-29
Payer: COMMERCIAL

## 2025-07-29 NOTE — TELEPHONE ENCOUNTER
[facility] Chart Review & Patient Outreach Details For Medication Adherence Project    Reason for Outreach Encounter: 3rd Party payor non-compliance report ([employer], [employer], C, etc)  2.  Patient Outreach Method: Reviewed Patient Chart  3.   Medication in question: lisinopril   LAST FILLED: 6/5/25 for 90 day supply  Hypertension Medications              lisinopriL (PRINIVIL,ZESTRIL) 5 MG tablet Take 1 tablet (5 mg total) by mouth once daily.              4.  Reviewed and or Updates Made To: Patient Chart  5. Outreach Outcomes and/or actions taken: Patient filled medication and is on track to be adherent

## 2025-08-04 ENCOUNTER — OFFICE VISIT (OUTPATIENT)
Dept: OBSTETRICS AND GYNECOLOGY | Facility: CLINIC | Age: 50
End: 2025-08-04
Payer: COMMERCIAL

## 2025-08-04 VITALS
BODY MASS INDEX: 40.77 KG/M2 | DIASTOLIC BLOOD PRESSURE: 70 MMHG | WEIGHT: 221.56 LBS | SYSTOLIC BLOOD PRESSURE: 106 MMHG | HEIGHT: 62 IN

## 2025-08-04 DIAGNOSIS — Z12.4 PAP SMEAR FOR CERVICAL CANCER SCREENING: ICD-10-CM

## 2025-08-04 DIAGNOSIS — Z90.710 S/P HYSTERECTOMY: Primary | ICD-10-CM

## 2025-08-04 PROCEDURE — 3044F HG A1C LEVEL LT 7.0%: CPT | Mod: CPTII,S$GLB,, | Performed by: OBSTETRICS & GYNECOLOGY

## 2025-08-04 PROCEDURE — 99213 OFFICE O/P EST LOW 20 MIN: CPT | Mod: S$GLB,,, | Performed by: OBSTETRICS & GYNECOLOGY

## 2025-08-04 PROCEDURE — 88175 CYTOPATH C/V AUTO FLUID REDO: CPT | Mod: TC | Performed by: OBSTETRICS & GYNECOLOGY

## 2025-08-04 PROCEDURE — 99999 PR PBB SHADOW E&M-EST. PATIENT-LVL III: CPT | Mod: PBBFAC,,, | Performed by: OBSTETRICS & GYNECOLOGY

## 2025-08-04 PROCEDURE — 4010F ACE/ARB THERAPY RXD/TAKEN: CPT | Mod: CPTII,S$GLB,, | Performed by: OBSTETRICS & GYNECOLOGY

## 2025-08-04 PROCEDURE — 3074F SYST BP LT 130 MM HG: CPT | Mod: CPTII,S$GLB,, | Performed by: OBSTETRICS & GYNECOLOGY

## 2025-08-04 PROCEDURE — 1160F RVW MEDS BY RX/DR IN RCRD: CPT | Mod: CPTII,S$GLB,, | Performed by: OBSTETRICS & GYNECOLOGY

## 2025-08-04 PROCEDURE — 1159F MED LIST DOCD IN RCRD: CPT | Mod: CPTII,S$GLB,, | Performed by: OBSTETRICS & GYNECOLOGY

## 2025-08-04 PROCEDURE — 3078F DIAST BP <80 MM HG: CPT | Mod: CPTII,S$GLB,, | Performed by: OBSTETRICS & GYNECOLOGY

## 2025-08-04 PROCEDURE — 3008F BODY MASS INDEX DOCD: CPT | Mod: CPTII,S$GLB,, | Performed by: OBSTETRICS & GYNECOLOGY

## 2025-08-04 NOTE — PROGRESS NOTES
Subjective     Patient ID: Sophai Cesar is a 49 y.o. female.    Chief Complaint:  Follow-up      History of Present Illness  Pt was referred by NP for evaluation of vaginal mass.  Pt reports no associated symptoms.  Denies pain or bleeding.  Reports history of supracervical hysterectomy at time of vaginal delivery in  due to PPH.  Also has both ovaries.  Last pap NILM in .    GYN & OB History  No LMP recorded. Patient has had a hysterectomy.   Date of Last Pap: 2013    OB History    Para Term  AB Living   2 2 2   2   SAB IAB Ectopic Multiple Live Births             # Outcome Date GA Lbr Gustavo/2nd Weight Sex Type Anes PTL Lv   2 Term            1 Term                Review of Systems  Review of Systems   Constitutional:  Negative for activity change, appetite change, chills, fatigue, fever and unexpected weight change.   Respiratory:  Negative for shortness of breath.    Cardiovascular:  Negative for chest pain, palpitations and leg swelling.   Gastrointestinal:  Negative for abdominal pain, bloating, blood in stool, constipation, diarrhea, nausea and vomiting.   Genitourinary:  Negative for dysmenorrhea, dyspareunia, dysuria, flank pain, frequency, genital sores, hematuria, menorrhagia, menstrual problem, pelvic pain, urgency, vaginal bleeding, vaginal discharge, vaginal pain, urinary incontinence, postcoital bleeding, vaginal dryness and vaginal odor.   Musculoskeletal:  Negative for back pain.   Integumentary:  Negative for rash.   Neurological:  Negative for syncope and headaches.          Objective   Physical Exam:   Constitutional: She is oriented to person, place, and time. She appears well-developed and well-nourished. No distress.       Cardiovascular:  Normal rate, regular rhythm and normal heart sounds.             Pulmonary/Chest: Effort normal and breath sounds normal.        Abdominal: Soft. Bowel sounds are normal. She exhibits no distension. There is no abdominal  tenderness.     Genitourinary:    Vagina, right adnexa and left adnexa normal.      Pelvic exam was performed with patient supine.   There is no rash, tenderness, lesion or injury on the right labia. There is no rash, tenderness, lesion or injury on the left labia. Cervix is normal. Right adnexum displays no mass, no tenderness and no fullness. Left adnexum displays no mass, no tenderness and no fullness. No erythema, vaginal discharge, tenderness or bleeding in the vagina.    No foreign body in the vagina.      No signs of injury in the vagina.   Cervix exhibits no motion tenderness and no tenderness.    pap smear completedUterus is absent.           Musculoskeletal: Normal range of motion and moves all extremeties. No tenderness or edema.       Neurological: She is alert and oriented to person, place, and time.    Skin: Skin is warm and dry.    Psychiatric: She has a normal mood and affect. Her behavior is normal. Thought content normal.      US Pelvis Comp with Transvag NON-OB (xpd  Narrative: EXAM: US PELVIS COMP WITH TRANSVAG NON-OB (XPD)    CLINICAL HISTORY: Pelvic pain.    TECHNIQUE: Complete transabdominal and transvaginal pelvic ultrasound was performed using color and spectral Doppler.    FINDINGS: Post hysterectomy.  Complex areas within the cervix measuring 0.8 x 1.5, 1.5 x 0.8 and 1.8 x 1.5 cm are of unknown clinical significance.  Right ovary is not seen. The left ovary measures 2.4 x 2.2 x 2 cm. No adnexal mass is seen .  No evidence of torsion within the left ovary.  NNo free fluid is seen in the pelvic cul-de-sac.  Impression: No definite acute amount is.  Complex cystic areas within the cervix are noted which overall have a benign appearance.  No ascites.  Recommend 3 month follow-up ultrasound.    Finalized on: 7/16/2025 5:28 PM By:  Horacio Man MD  [facility]# [id number]      2025-07-16 17:30:[id number]     [facility]            Assessment and Plan     1. S/P hysterectomy    2. Pap smear for cervical cancer  screening           Plan:  S/P hysterectomy  -     Pt with known history of supracervical hysterectomy.  Exam findings today and US results are consistent with this.  Vaginal mass reported by NP represents her cervix and is not concerning.  Pt reassured.    Pap smear for cervical cancer screening  -     Liquid-Based Pap Smear, Screening  -     Pt was counseled on cervical/vaginal screening guidelines and recommendations. If today's pap smear result is negative, next pap smear will be due in 3 yrs.  -     Follow up with PCP for routine health maintenance needs.      Follow up in about 1 year (around 8/4/2026).

## 2025-08-08 LAB
HPV DNA, HIGH RISK TYPE 16, PCR (OHS): NEGATIVE
HPV DNA, HIGH RISK TYPE 18, PCR (OHS): NEGATIVE
HPV DNA, HIGH RISK TYPE OTHER, PCR (OHS): NEGATIVE
INSULIN SERPL-ACNC: NORMAL U[IU]/ML
LAB AP BETHESDA CATEGORY: NORMAL
LAB AP CLINICAL FINDINGS: NORMAL
LAB AP CONTRACEPTIVES: NORMAL
LAB AP GYN ADDITIONAL FINDINGS: NORMAL
LAB AP OCHS PAP SPECIMEN ADEQUACY: NORMAL
LAB AP OHS PAP INTERPRETATION: NORMAL
LAB AP PAP DISCLAIMER COMMENTS: NORMAL
LAB AP PAP ESTROGEN REPLACEMENT THERAPY: NORMAL
LAB AP PAP PMP: NORMAL
LAB AP PAP PREVIOUS BX: NORMAL
LAB AP PAP PRIOR TREATMENT: NORMAL
LAB AP PERFORMING LOCATION(S): NORMAL

## 2025-08-18 ENCOUNTER — PATIENT MESSAGE (OUTPATIENT)
Dept: FAMILY MEDICINE | Facility: CLINIC | Age: 50
End: 2025-08-18
Payer: COMMERCIAL